# Patient Record
Sex: FEMALE | Race: WHITE | Employment: FULL TIME | URBAN - METROPOLITAN AREA
[De-identification: names, ages, dates, MRNs, and addresses within clinical notes are randomized per-mention and may not be internally consistent; named-entity substitution may affect disease eponyms.]

---

## 2017-01-06 PROBLEM — J40 BRONCHITIS: Status: ACTIVE | Noted: 2017-01-06

## 2017-01-06 PROBLEM — J01.00 ACUTE NON-RECURRENT MAXILLARY SINUSITIS: Status: ACTIVE | Noted: 2017-01-06

## 2018-09-30 ENCOUNTER — HOSPITAL ENCOUNTER (OUTPATIENT)
Age: 43
Discharge: HOME | End: 2018-09-30
Payer: COMMERCIAL

## 2018-09-30 ENCOUNTER — HOSPITAL ENCOUNTER (INPATIENT)
Age: 43
LOS: 1 days | Discharge: HOME | DRG: 392 | End: 2018-10-02
Attending: EMERGENCY MEDICINE | Admitting: INTERNAL MEDICINE
Payer: COMMERCIAL

## 2018-09-30 ENCOUNTER — APPOINTMENT (OUTPATIENT)
Dept: CT IMAGING | Age: 43
DRG: 392 | End: 2018-09-30
Payer: COMMERCIAL

## 2018-09-30 DIAGNOSIS — R10.32 LEFT LOWER QUADRANT PAIN: ICD-10-CM

## 2018-09-30 DIAGNOSIS — K57.32 DIVERTICULITIS OF COLON: Primary | ICD-10-CM

## 2018-09-30 PROBLEM — E66.01 MORBID OBESITY WITH BMI OF 50.0-59.9, ADULT (HCC): Status: ACTIVE | Noted: 2018-09-30

## 2018-09-30 PROBLEM — K57.92 DIVERTICULITIS: Status: ACTIVE | Noted: 2018-09-30

## 2018-09-30 PROBLEM — F31.9 BIPOLAR DISORDER (HCC): Status: ACTIVE | Noted: 2018-09-30

## 2018-09-30 LAB
ALBUMIN SERPL-MCNC: 4.2 G/DL (ref 3.5–5.2)
ALP BLD-CCNC: 53 U/L (ref 35–104)
ALT SERPL-CCNC: 51 U/L (ref 0–32)
ANION GAP SERPL CALCULATED.3IONS-SCNC: 15 MMOL/L (ref 7–16)
AST SERPL-CCNC: 32 U/L (ref 0–31)
BASOPHILS ABSOLUTE: 0.05 E9/L (ref 0–0.2)
BASOPHILS RELATIVE PERCENT: 0.5 % (ref 0–2)
BILIRUB SERPL-MCNC: 0.9 MG/DL (ref 0–1.2)
BILIRUBIN URINE: NEGATIVE
BLOOD, URINE: NEGATIVE
BUN BLDV-MCNC: 11 MG/DL (ref 6–20)
CALCIUM SERPL-MCNC: 8.9 MG/DL (ref 8.6–10.2)
CHLORIDE BLD-SCNC: 100 MMOL/L (ref 98–107)
CLARITY: CLEAR
CO2: 24 MMOL/L (ref 22–29)
COLOR: YELLOW
CREAT SERPL-MCNC: 0.8 MG/DL (ref 0.5–1)
EOSINOPHILS ABSOLUTE: 0.44 E9/L (ref 0.05–0.5)
EOSINOPHILS RELATIVE PERCENT: 4 % (ref 0–6)
GFR AFRICAN AMERICAN: >60
GFR NON-AFRICAN AMERICAN: >60 ML/MIN/1.73
GLUCOSE BLD-MCNC: 102 MG/DL (ref 74–109)
GLUCOSE URINE: NEGATIVE MG/DL
HCT VFR BLD CALC: 40.3 % (ref 34–48)
HEMOGLOBIN: 14.2 G/DL (ref 11.5–15.5)
IMMATURE GRANULOCYTES #: 0.06 E9/L
IMMATURE GRANULOCYTES %: 0.5 % (ref 0–5)
KETONES, URINE: NEGATIVE MG/DL
LEUKOCYTE ESTERASE, URINE: NEGATIVE
LIPASE: 30 U/L (ref 13–60)
LYMPHOCYTES ABSOLUTE: 1.71 E9/L (ref 1.5–4)
LYMPHOCYTES RELATIVE PERCENT: 15.4 % (ref 20–42)
MCH RBC QN AUTO: 32.7 PG (ref 26–35)
MCHC RBC AUTO-ENTMCNC: 35.2 % (ref 32–34.5)
MCV RBC AUTO: 92.9 FL (ref 80–99.9)
MONOCYTES ABSOLUTE: 0.66 E9/L (ref 0.1–0.95)
MONOCYTES RELATIVE PERCENT: 6 % (ref 2–12)
NEUTROPHILS ABSOLUTE: 8.15 E9/L (ref 1.8–7.3)
NEUTROPHILS RELATIVE PERCENT: 73.6 % (ref 43–80)
NITRITE, URINE: NEGATIVE
PDW BLD-RTO: 11.6 FL (ref 11.5–15)
PH UA: 6 (ref 5–9)
PLATELET # BLD: 199 E9/L (ref 130–450)
PMV BLD AUTO: 11 FL (ref 7–12)
POTASSIUM SERPL-SCNC: 3.5 MMOL/L (ref 3.5–5)
PROTEIN UA: NEGATIVE MG/DL
RBC # BLD: 4.34 E12/L (ref 3.5–5.5)
SODIUM BLD-SCNC: 139 MMOL/L (ref 132–146)
SPECIFIC GRAVITY UA: 1.01 (ref 1–1.03)
TOTAL PROTEIN: 6.4 G/DL (ref 6.4–8.3)
UROBILINOGEN, URINE: 0.2 E.U./DL
WBC # BLD: 11.1 E9/L (ref 4.5–11.5)

## 2018-09-30 PROCEDURE — 96375 TX/PRO/DX INJ NEW DRUG ADDON: CPT

## 2018-09-30 PROCEDURE — 96365 THER/PROPH/DIAG IV INF INIT: CPT

## 2018-09-30 PROCEDURE — 80053 COMPREHEN METABOLIC PANEL: CPT

## 2018-09-30 PROCEDURE — G0378 HOSPITAL OBSERVATION PER HR: HCPCS

## 2018-09-30 PROCEDURE — 74176 CT ABD & PELVIS W/O CONTRAST: CPT

## 2018-09-30 PROCEDURE — 99285 EMERGENCY DEPT VISIT HI MDM: CPT

## 2018-09-30 PROCEDURE — 6370000000 HC RX 637 (ALT 250 FOR IP): Performed by: INTERNAL MEDICINE

## 2018-09-30 PROCEDURE — 6360000002 HC RX W HCPCS: Performed by: EMERGENCY MEDICINE

## 2018-09-30 PROCEDURE — 2500000003 HC RX 250 WO HCPCS: Performed by: INTERNAL MEDICINE

## 2018-09-30 PROCEDURE — 81003 URINALYSIS AUTO W/O SCOPE: CPT

## 2018-09-30 PROCEDURE — 2500000003 HC RX 250 WO HCPCS: Performed by: EMERGENCY MEDICINE

## 2018-09-30 PROCEDURE — 36415 COLL VENOUS BLD VENIPUNCTURE: CPT

## 2018-09-30 PROCEDURE — 85025 COMPLETE CBC W/AUTO DIFF WBC: CPT

## 2018-09-30 PROCEDURE — A0425 GROUND MILEAGE: HCPCS

## 2018-09-30 PROCEDURE — 83690 ASSAY OF LIPASE: CPT

## 2018-09-30 PROCEDURE — 2580000003 HC RX 258: Performed by: EMERGENCY MEDICINE

## 2018-09-30 PROCEDURE — A0428 BLS: HCPCS

## 2018-09-30 PROCEDURE — 2580000003 HC RX 258: Performed by: INTERNAL MEDICINE

## 2018-09-30 PROCEDURE — 6360000002 HC RX W HCPCS: Performed by: INTERNAL MEDICINE

## 2018-09-30 RX ORDER — OXYCODONE HYDROCHLORIDE AND ACETAMINOPHEN 5; 325 MG/1; MG/1
1 TABLET ORAL EVERY 4 HOURS PRN
Status: DISCONTINUED | OUTPATIENT
Start: 2018-09-30 | End: 2018-10-02 | Stop reason: HOSPADM

## 2018-09-30 RX ORDER — OXYCODONE HYDROCHLORIDE AND ACETAMINOPHEN 5; 325 MG/1; MG/1
2 TABLET ORAL EVERY 4 HOURS PRN
Status: DISCONTINUED | OUTPATIENT
Start: 2018-09-30 | End: 2018-10-02 | Stop reason: HOSPADM

## 2018-09-30 RX ORDER — MORPHINE SULFATE 4 MG/ML
4 INJECTION, SOLUTION INTRAMUSCULAR; INTRAVENOUS ONCE
Status: COMPLETED | OUTPATIENT
Start: 2018-09-30 | End: 2018-09-30

## 2018-09-30 RX ORDER — ONDANSETRON 2 MG/ML
4 INJECTION INTRAMUSCULAR; INTRAVENOUS EVERY 6 HOURS PRN
Status: DISCONTINUED | OUTPATIENT
Start: 2018-09-30 | End: 2018-10-02 | Stop reason: HOSPADM

## 2018-09-30 RX ORDER — ATORVASTATIN CALCIUM 10 MG/1
10 TABLET, FILM COATED ORAL DAILY
COMMUNITY
End: 2021-09-27

## 2018-09-30 RX ORDER — HYDROCHLOROTHIAZIDE 25 MG/1
25 TABLET ORAL DAILY
COMMUNITY
End: 2019-01-09 | Stop reason: SDUPTHER

## 2018-09-30 RX ORDER — SODIUM CHLORIDE 0.9 % (FLUSH) 0.9 %
10 SYRINGE (ML) INJECTION EVERY 12 HOURS SCHEDULED
Status: DISCONTINUED | OUTPATIENT
Start: 2018-09-30 | End: 2018-10-02 | Stop reason: HOSPADM

## 2018-09-30 RX ORDER — SODIUM CHLORIDE 9 MG/ML
INJECTION, SOLUTION INTRAVENOUS CONTINUOUS
Status: DISCONTINUED | OUTPATIENT
Start: 2018-09-30 | End: 2018-10-02

## 2018-09-30 RX ORDER — CIPROFLOXACIN 2 MG/ML
400 INJECTION, SOLUTION INTRAVENOUS EVERY 12 HOURS
Status: DISCONTINUED | OUTPATIENT
Start: 2018-09-30 | End: 2018-10-02 | Stop reason: HOSPADM

## 2018-09-30 RX ORDER — 0.9 % SODIUM CHLORIDE 0.9 %
500 INTRAVENOUS SOLUTION INTRAVENOUS ONCE
Status: COMPLETED | OUTPATIENT
Start: 2018-09-30 | End: 2018-09-30

## 2018-09-30 RX ORDER — 0.9 % SODIUM CHLORIDE 0.9 %
1000 INTRAVENOUS SOLUTION INTRAVENOUS ONCE
Status: COMPLETED | OUTPATIENT
Start: 2018-09-30 | End: 2018-09-30

## 2018-09-30 RX ORDER — ACETAMINOPHEN 325 MG/1
650 TABLET ORAL EVERY 4 HOURS PRN
Status: DISCONTINUED | OUTPATIENT
Start: 2018-09-30 | End: 2018-10-02 | Stop reason: HOSPADM

## 2018-09-30 RX ORDER — SODIUM CHLORIDE 0.9 % (FLUSH) 0.9 %
10 SYRINGE (ML) INJECTION PRN
Status: DISCONTINUED | OUTPATIENT
Start: 2018-09-30 | End: 2018-10-02 | Stop reason: HOSPADM

## 2018-09-30 RX ORDER — ONDANSETRON 2 MG/ML
4 INJECTION INTRAMUSCULAR; INTRAVENOUS ONCE
Status: COMPLETED | OUTPATIENT
Start: 2018-09-30 | End: 2018-09-30

## 2018-09-30 RX ADMIN — METRONIDAZOLE 500 MG: 500 INJECTION, SOLUTION INTRAVENOUS at 16:03

## 2018-09-30 RX ADMIN — CIPROFLOXACIN 400 MG: 2 INJECTION, SOLUTION INTRAVENOUS at 21:43

## 2018-09-30 RX ADMIN — OXYCODONE AND ACETAMINOPHEN 1 TABLET: 5; 325 TABLET ORAL at 20:09

## 2018-09-30 RX ADMIN — METRONIDAZOLE 500 MG: 500 INJECTION, SOLUTION INTRAVENOUS at 20:10

## 2018-09-30 RX ADMIN — ONDANSETRON 4 MG: 2 INJECTION INTRAMUSCULAR; INTRAVENOUS at 14:38

## 2018-09-30 RX ADMIN — Medication 10 ML: at 20:10

## 2018-09-30 RX ADMIN — MORPHINE SULFATE 4 MG: 4 INJECTION INTRAVENOUS at 14:39

## 2018-09-30 RX ADMIN — SODIUM CHLORIDE 1000 ML: 9 INJECTION, SOLUTION INTRAVENOUS at 14:39

## 2018-09-30 RX ADMIN — SODIUM CHLORIDE: 9 INJECTION, SOLUTION INTRAVENOUS at 20:13

## 2018-09-30 RX ADMIN — MORPHINE SULFATE 4 MG: 4 INJECTION INTRAVENOUS at 17:45

## 2018-09-30 RX ADMIN — SODIUM CHLORIDE 500 ML: 9 INJECTION, SOLUTION INTRAVENOUS at 16:03

## 2018-09-30 ASSESSMENT — PAIN DESCRIPTION - PROGRESSION: CLINICAL_PROGRESSION: GRADUALLY IMPROVING

## 2018-09-30 ASSESSMENT — PAIN DESCRIPTION - DESCRIPTORS
DESCRIPTORS: DISCOMFORT
DESCRIPTORS: SHARP
DESCRIPTORS: HEADACHE

## 2018-09-30 ASSESSMENT — PAIN DESCRIPTION - FREQUENCY
FREQUENCY: CONTINUOUS
FREQUENCY: INTERMITTENT
FREQUENCY: CONTINUOUS

## 2018-09-30 ASSESSMENT — PAIN SCALES - GENERAL
PAINLEVEL_OUTOF10: 10
PAINLEVEL_OUTOF10: 4
PAINLEVEL_OUTOF10: 10
PAINLEVEL_OUTOF10: 5
PAINLEVEL_OUTOF10: 2
PAINLEVEL_OUTOF10: 4

## 2018-09-30 ASSESSMENT — PAIN DESCRIPTION - LOCATION
LOCATION: ABDOMEN
LOCATION: ABDOMEN
LOCATION: GENERALIZED

## 2018-09-30 ASSESSMENT — PAIN DESCRIPTION - PAIN TYPE
TYPE: ACUTE PAIN

## 2018-09-30 ASSESSMENT — PAIN DESCRIPTION - ORIENTATION: ORIENTATION: MID;LOWER

## 2018-09-30 ASSESSMENT — PAIN DESCRIPTION - ONSET: ONSET: GRADUAL

## 2018-09-30 NOTE — ED PROVIDER NOTES
Phosphatase 53 35 - 104 U/L    ALT 51 (H) 0 - 32 U/L    AST 32 (H) 0 - 31 U/L   CBC Auto Differential   Result Value Ref Range    WBC 11.1 4.5 - 11.5 E9/L    RBC 4.34 3.50 - 5.50 E12/L    Hemoglobin 14.2 11.5 - 15.5 g/dL    Hematocrit 40.3 34.0 - 48.0 %    MCV 92.9 80.0 - 99.9 fL    MCH 32.7 26.0 - 35.0 pg    MCHC 35.2 (H) 32.0 - 34.5 %    RDW 11.6 11.5 - 15.0 fL    Platelets 709 420 - 365 E9/L    MPV 11.0 7.0 - 12.0 fL    Neutrophils % 73.6 43.0 - 80.0 %    Immature Granulocytes % 0.5 0.0 - 5.0 %    Lymphocytes % 15.4 (L) 20.0 - 42.0 %    Monocytes % 6.0 2.0 - 12.0 %    Eosinophils % 4.0 0.0 - 6.0 %    Basophils % 0.5 0.0 - 2.0 %    Neutrophils # 8.15 (H) 1.80 - 7.30 E9/L    Immature Granulocytes # 0.06 E9/L    Lymphocytes # 1.71 1.50 - 4.00 E9/L    Monocytes # 0.66 0.10 - 0.95 E9/L    Eosinophils # 0.44 0.05 - 0.50 E9/L    Basophils # 0.05 0.00 - 0.20 E9/L   Urinalysis   Result Value Ref Range    Color, UA Yellow Straw/Yellow    Clarity, UA Clear Clear    Glucose, Ur Negative Negative mg/dL    Bilirubin Urine Negative Negative    Ketones, Urine Negative Negative mg/dL    Specific Gravity, UA 1.015 1.005 - 1.030    Blood, Urine Negative Negative    pH, UA 6.0 5.0 - 9.0    Protein, UA Negative Negative mg/dL    Urobilinogen, Urine 0.2 <2.0 E.U./dL    Nitrite, Urine Negative Negative    Leukocyte Esterase, Urine Negative Negative       RADIOLOGY:  Interpreted by Radiologist.  CT ABDOMEN PELVIS WO CONTRAST   Final Result   Acute diverticulitis of the proximal/mid third of the sigmoid colon,   as above described. See above other comments and recommendations. ALERT:  THIS IS AN ABNORMAL REPORT          ------------------------- NURSING NOTES AND VITALS REVIEWED ---------------------------   The nursing notes within the ED encounter and vital signs as below have been reviewed.    BP (!) 138/98   Pulse 82   Temp 99.1 °F (37.3 °C) (Oral)   Resp 14   Ht 5' 8\" (1.727 m)   Wt (!) 355 lb (161 kg)   SpO2 97%

## 2018-10-01 PROBLEM — I10 HTN (HYPERTENSION), BENIGN: Chronic | Status: ACTIVE | Noted: 2018-10-01

## 2018-10-01 PROBLEM — J01.00 ACUTE NON-RECURRENT MAXILLARY SINUSITIS: Status: RESOLVED | Noted: 2017-01-06 | Resolved: 2018-10-01

## 2018-10-01 PROBLEM — E78.2 MIXED HYPERLIPIDEMIA: Chronic | Status: ACTIVE | Noted: 2018-10-01

## 2018-10-01 PROBLEM — K57.92 ACUTE DIVERTICULITIS: Status: ACTIVE | Noted: 2018-10-01

## 2018-10-01 PROBLEM — J40 BRONCHITIS: Status: RESOLVED | Noted: 2017-01-06 | Resolved: 2018-10-01

## 2018-10-01 LAB
ANION GAP SERPL CALCULATED.3IONS-SCNC: 16 MMOL/L (ref 7–16)
BUN BLDV-MCNC: 9 MG/DL (ref 6–20)
CALCIUM SERPL-MCNC: 8.3 MG/DL (ref 8.6–10.2)
CHLORIDE BLD-SCNC: 96 MMOL/L (ref 98–107)
CO2: 25 MMOL/L (ref 22–29)
CREAT SERPL-MCNC: 0.8 MG/DL (ref 0.5–1)
GFR AFRICAN AMERICAN: >60
GFR NON-AFRICAN AMERICAN: >60 ML/MIN/1.73
GLUCOSE BLD-MCNC: 115 MG/DL (ref 74–109)
HCT VFR BLD CALC: 37.9 % (ref 34–48)
HEMOGLOBIN: 13 G/DL (ref 11.5–15.5)
MCH RBC QN AUTO: 32.1 PG (ref 26–35)
MCHC RBC AUTO-ENTMCNC: 34.3 % (ref 32–34.5)
MCV RBC AUTO: 93.6 FL (ref 80–99.9)
PDW BLD-RTO: 11.9 FL (ref 11.5–15)
PLATELET # BLD: 176 E9/L (ref 130–450)
PMV BLD AUTO: 10.5 FL (ref 7–12)
POTASSIUM REFLEX MAGNESIUM: 3.6 MMOL/L (ref 3.5–5)
RBC # BLD: 4.05 E12/L (ref 3.5–5.5)
SODIUM BLD-SCNC: 137 MMOL/L (ref 132–146)
WBC # BLD: 10.6 E9/L (ref 4.5–11.5)

## 2018-10-01 PROCEDURE — 6360000002 HC RX W HCPCS: Performed by: INTERNAL MEDICINE

## 2018-10-01 PROCEDURE — 36415 COLL VENOUS BLD VENIPUNCTURE: CPT

## 2018-10-01 PROCEDURE — 1200000000 HC SEMI PRIVATE

## 2018-10-01 PROCEDURE — 85027 COMPLETE CBC AUTOMATED: CPT

## 2018-10-01 PROCEDURE — 80048 BASIC METABOLIC PNL TOTAL CA: CPT

## 2018-10-01 PROCEDURE — 6370000000 HC RX 637 (ALT 250 FOR IP): Performed by: INTERNAL MEDICINE

## 2018-10-01 PROCEDURE — 2580000003 HC RX 258: Performed by: INTERNAL MEDICINE

## 2018-10-01 PROCEDURE — G0378 HOSPITAL OBSERVATION PER HR: HCPCS

## 2018-10-01 PROCEDURE — 2500000003 HC RX 250 WO HCPCS: Performed by: INTERNAL MEDICINE

## 2018-10-01 RX ORDER — HYDROCHLOROTHIAZIDE 25 MG/1
25 TABLET ORAL DAILY
Status: DISCONTINUED | OUTPATIENT
Start: 2018-10-01 | End: 2018-10-02 | Stop reason: HOSPADM

## 2018-10-01 RX ORDER — MORPHINE SULFATE 2 MG/ML
2 INJECTION, SOLUTION INTRAMUSCULAR; INTRAVENOUS EVERY 4 HOURS PRN
Status: DISCONTINUED | OUTPATIENT
Start: 2018-10-01 | End: 2018-10-02 | Stop reason: HOSPADM

## 2018-10-01 RX ORDER — ATORVASTATIN CALCIUM 10 MG/1
10 TABLET, FILM COATED ORAL DAILY
Status: DISCONTINUED | OUTPATIENT
Start: 2018-10-01 | End: 2018-10-02 | Stop reason: HOSPADM

## 2018-10-01 RX ADMIN — ATORVASTATIN CALCIUM 10 MG: 10 TABLET, FILM COATED ORAL at 15:01

## 2018-10-01 RX ADMIN — HYDROCHLOROTHIAZIDE 25 MG: 25 TABLET ORAL at 15:01

## 2018-10-01 RX ADMIN — METRONIDAZOLE 500 MG: 500 INJECTION, SOLUTION INTRAVENOUS at 13:37

## 2018-10-01 RX ADMIN — METRONIDAZOLE 500 MG: 500 INJECTION, SOLUTION INTRAVENOUS at 03:45

## 2018-10-01 RX ADMIN — CIPROFLOXACIN 400 MG: 2 INJECTION, SOLUTION INTRAVENOUS at 08:52

## 2018-10-01 RX ADMIN — SODIUM CHLORIDE: 9 INJECTION, SOLUTION INTRAVENOUS at 13:37

## 2018-10-01 RX ADMIN — OXYCODONE AND ACETAMINOPHEN 1 TABLET: 5; 325 TABLET ORAL at 01:17

## 2018-10-01 RX ADMIN — OXYCODONE AND ACETAMINOPHEN 1 TABLET: 5; 325 TABLET ORAL at 13:45

## 2018-10-01 RX ADMIN — OXYCODONE AND ACETAMINOPHEN 2 TABLET: 5; 325 TABLET ORAL at 22:53

## 2018-10-01 RX ADMIN — CIPROFLOXACIN 400 MG: 2 INJECTION, SOLUTION INTRAVENOUS at 22:47

## 2018-10-01 RX ADMIN — OXYCODONE AND ACETAMINOPHEN 1 TABLET: 5; 325 TABLET ORAL at 18:41

## 2018-10-01 RX ADMIN — METRONIDAZOLE 500 MG: 500 INJECTION, SOLUTION INTRAVENOUS at 20:28

## 2018-10-01 ASSESSMENT — PAIN DESCRIPTION - ONSET
ONSET: ON-GOING

## 2018-10-01 ASSESSMENT — PAIN DESCRIPTION - FREQUENCY
FREQUENCY: CONTINUOUS

## 2018-10-01 ASSESSMENT — PAIN DESCRIPTION - PROGRESSION
CLINICAL_PROGRESSION: GRADUALLY IMPROVING
CLINICAL_PROGRESSION: GRADUALLY IMPROVING

## 2018-10-01 ASSESSMENT — PAIN DESCRIPTION - PAIN TYPE
TYPE: ACUTE PAIN

## 2018-10-01 ASSESSMENT — PAIN DESCRIPTION - DESCRIPTORS
DESCRIPTORS: CRAMPING
DESCRIPTORS: DISCOMFORT;CRAMPING
DESCRIPTORS: ACHING;CONSTANT;CRAMPING
DESCRIPTORS: CRAMPING

## 2018-10-01 ASSESSMENT — PAIN DESCRIPTION - ORIENTATION
ORIENTATION: MID;LOWER
ORIENTATION: LOWER

## 2018-10-01 ASSESSMENT — PAIN DESCRIPTION - LOCATION
LOCATION: ABDOMEN

## 2018-10-01 ASSESSMENT — PAIN SCALES - GENERAL
PAINLEVEL_OUTOF10: 6
PAINLEVEL_OUTOF10: 5
PAINLEVEL_OUTOF10: 7
PAINLEVEL_OUTOF10: 4
PAINLEVEL_OUTOF10: 6
PAINLEVEL_OUTOF10: 5
PAINLEVEL_OUTOF10: 6

## 2018-10-01 NOTE — H&P
History:   Family history of diverticulosis in her father, uncle and cousin. REVIEW OF SYSTEMS:  As above in the HPI, otherwise negative    PHYSICAL EXAM:    Vitals:  /83   Pulse 72   Temp 98.1 °F (36.7 °C) (Temporal)   Resp 16   Ht 5' 8\" (1.727 m)   Wt (!) 355 lb (161 kg)   SpO2 96%   BMI 53.98 kg/m²     General:  Awake, alert, oriented X 3. Well developed, well nourished, well groomed. No apparent distress. HEENT:  Normocephalic, atraumatic. No scleral icterus. No conjunctival injection. Normal lips, teeth, and gums. No nasal discharge. Neck:  Supple  Heart:  RRR, no murmurs, gallops, or rubs  Lungs:  CTA bilaterally, bilat symmetrical expansion, no wheeze, rales, or rhonchi  Abdomen:   Bowel sounds present, soft, non distended, tender on palpation of LUQ/LLQ, no masses, no organomegaly, no peritoneal signs  Extremities:  No clubbing, cyanosis, or edema  Skin:  Warm and dry, no open lesions or rash  Neuro:  Cranial nerves 2-12 intact, no focal deficits  Breast: deferred  Rectal: deferred  Genitalia:  deferred    LABS:  CBC:   Lab Results   Component Value Date    WBC 10.6 10/01/2018    RBC 4.05 10/01/2018    HGB 13.0 10/01/2018    HCT 37.9 10/01/2018    MCV 93.6 10/01/2018    MCH 32.1 10/01/2018    MCHC 34.3 10/01/2018    RDW 11.9 10/01/2018     10/01/2018    MPV 10.5 10/01/2018     BMP:    Lab Results   Component Value Date     10/01/2018    K 3.6 10/01/2018    CL 96 10/01/2018    CO2 25 10/01/2018    BUN 9 10/01/2018    CREATININE 0.8 10/01/2018    CALCIUM 8.3 10/01/2018    GFRAA >60 10/01/2018    LABGLOM >60 10/01/2018    GLUCOSE 115 10/01/2018        U/A:    Lab Results   Component Value Date    COLORU Yellow 09/30/2018    PROTEINU Negative 09/30/2018    PHUR 6.0 09/30/2018    CLARITYU Clear 09/30/2018    SPECGRAV 1.015 09/30/2018    LEUKOCYTESUR Negative 09/30/2018    UROBILINOGEN 0.2 09/30/2018    BILIRUBINUR Negative 09/30/2018    BLOODU Negative 09/30/2018    GLUCOSEU

## 2018-10-02 VITALS
WEIGHT: 293 LBS | BODY MASS INDEX: 44.41 KG/M2 | SYSTOLIC BLOOD PRESSURE: 122 MMHG | OXYGEN SATURATION: 98 % | RESPIRATION RATE: 18 BRPM | DIASTOLIC BLOOD PRESSURE: 78 MMHG | HEART RATE: 80 BPM | TEMPERATURE: 97.9 F | HEIGHT: 68 IN

## 2018-10-02 PROBLEM — K57.92 DIVERTICULITIS: Status: RESOLVED | Noted: 2018-09-30 | Resolved: 2018-10-02

## 2018-10-02 PROCEDURE — 90686 IIV4 VACC NO PRSV 0.5 ML IM: CPT | Performed by: INTERNAL MEDICINE

## 2018-10-02 PROCEDURE — 6360000002 HC RX W HCPCS: Performed by: INTERNAL MEDICINE

## 2018-10-02 PROCEDURE — G0008 ADMIN INFLUENZA VIRUS VAC: HCPCS | Performed by: INTERNAL MEDICINE

## 2018-10-02 PROCEDURE — G0378 HOSPITAL OBSERVATION PER HR: HCPCS

## 2018-10-02 PROCEDURE — 6370000000 HC RX 637 (ALT 250 FOR IP): Performed by: INTERNAL MEDICINE

## 2018-10-02 PROCEDURE — 2580000003 HC RX 258: Performed by: INTERNAL MEDICINE

## 2018-10-02 PROCEDURE — 2500000003 HC RX 250 WO HCPCS: Performed by: INTERNAL MEDICINE

## 2018-10-02 RX ORDER — CIPROFLOXACIN 500 MG/1
500 TABLET, FILM COATED ORAL 2 TIMES DAILY
Qty: 14 TABLET | Refills: 0 | Status: SHIPPED | OUTPATIENT
Start: 2018-10-02 | End: 2018-10-09

## 2018-10-02 RX ORDER — METRONIDAZOLE 500 MG/1
500 TABLET ORAL 3 TIMES DAILY
Qty: 21 TABLET | Refills: 0 | Status: SHIPPED | OUTPATIENT
Start: 2018-10-02 | End: 2018-10-09

## 2018-10-02 RX ORDER — OXYCODONE HYDROCHLORIDE AND ACETAMINOPHEN 5; 325 MG/1; MG/1
1 TABLET ORAL EVERY 6 HOURS PRN
Qty: 12 TABLET | Refills: 0 | Status: SHIPPED | OUTPATIENT
Start: 2018-10-02 | End: 2018-10-05

## 2018-10-02 RX ORDER — ONDANSETRON 4 MG/1
4 TABLET, FILM COATED ORAL EVERY 8 HOURS PRN
Qty: 10 TABLET | Refills: 0 | Status: SHIPPED | OUTPATIENT
Start: 2018-10-02 | End: 2018-11-30 | Stop reason: ALTCHOICE

## 2018-10-02 RX ADMIN — METRONIDAZOLE 500 MG: 500 INJECTION, SOLUTION INTRAVENOUS at 03:59

## 2018-10-02 RX ADMIN — SODIUM CHLORIDE: 9 INJECTION, SOLUTION INTRAVENOUS at 08:38

## 2018-10-02 RX ADMIN — CIPROFLOXACIN 400 MG: 2 INJECTION, SOLUTION INTRAVENOUS at 10:01

## 2018-10-02 RX ADMIN — HYDROCHLOROTHIAZIDE 25 MG: 25 TABLET ORAL at 10:01

## 2018-10-02 RX ADMIN — INFLUENZA A VIRUS A/MICHIGAN/45/2015 X-275 (H1N1) ANTIGEN (FORMALDEHYDE INACTIVATED), INFLUENZA A VIRUS A/SINGAPORE/INFIMH-16-0019/2016 IVR-186 (H3N2) ANTIGEN (FORMALDEHYDE INACTIVATED), INFLUENZA B VIRUS B/PHUKET/3073/2013 ANTIGEN (FORMALDEHYDE INACTIVATED), AND INFLUENZA B VIRUS B/MARYLAND/15/2016 BX-69A ANTIGEN (FORMALDEHYDE INACTIVATED) 0.5 ML: 15; 15; 15; 15 INJECTION, SUSPENSION INTRAMUSCULAR at 11:17

## 2018-10-02 RX ADMIN — ONDANSETRON HYDROCHLORIDE 4 MG: 2 SOLUTION INTRAMUSCULAR; INTRAVENOUS at 08:22

## 2018-10-02 RX ADMIN — ATORVASTATIN CALCIUM 10 MG: 10 TABLET, FILM COATED ORAL at 10:01

## 2018-10-02 RX ADMIN — Medication 10 ML: at 08:23

## 2018-10-02 ASSESSMENT — PAIN SCALES - GENERAL: PAINLEVEL_OUTOF10: 2

## 2018-10-02 NOTE — DISCHARGE INSTR - DIET

## 2018-10-02 NOTE — PROGRESS NOTES
Discharge note  Dietician visits patient in room. Discharge instructions reviewed and patient verbalizes understanding, signed copy for patient and medical records. Meds to beds program completed, patient satisfied with all 4 scripts filled. Friend here, out in wheelchair in stable condition.

## 2018-10-02 NOTE — PROGRESS NOTES
CLINICAL PHARMACY NOTE: MEDS TO [address] Select Patient?: No  Total # of Prescriptions Filled: 4   The following medications were delivered to the patient:  · Zofran 4mg  · Cipro 500mg  · Plaqul 500mg  · Percocet 5/325mg  Total # of Interventions Completed: 3  Time Spent (min): 30    Additional Documentation:

## 2018-11-02 ENCOUNTER — HOSPITAL ENCOUNTER (OUTPATIENT)
Dept: MAMMOGRAPHY | Age: 43
Discharge: HOME | End: 2018-11-04
Payer: COMMERCIAL

## 2018-11-02 ENCOUNTER — HOSPITAL ENCOUNTER (OUTPATIENT)
Age: 43
Discharge: HOME | End: 2018-11-04
Payer: COMMERCIAL

## 2018-11-02 DIAGNOSIS — Z12.31 ENCOUNTER FOR SCREENING MAMMOGRAM FOR MALIGNANT NEOPLASM OF BREAST: ICD-10-CM

## 2018-11-02 PROCEDURE — 77067 SCR MAMMO BI INCL CAD: CPT

## 2018-11-30 RX ORDER — OMEPRAZOLE 20 MG/1
40 CAPSULE, DELAYED RELEASE ORAL EVERY OTHER DAY
COMMUNITY
End: 2021-09-27

## 2019-01-09 ENCOUNTER — OFFICE VISIT (OUTPATIENT)
Dept: BARIATRICS/WEIGHT MGMT | Age: 44
End: 2019-01-09
Payer: COMMERCIAL

## 2019-01-09 VITALS
DIASTOLIC BLOOD PRESSURE: 80 MMHG | TEMPERATURE: 96.8 F | WEIGHT: 293 LBS | HEIGHT: 68 IN | SYSTOLIC BLOOD PRESSURE: 134 MMHG | BODY MASS INDEX: 44.41 KG/M2 | RESPIRATION RATE: 24 BRPM | HEART RATE: 76 BPM

## 2019-01-09 DIAGNOSIS — E66.01 MORBID OBESITY DUE TO EXCESS CALORIES (HCC): ICD-10-CM

## 2019-01-09 DIAGNOSIS — M25.561 CHRONIC PAIN OF BOTH KNEES: ICD-10-CM

## 2019-01-09 DIAGNOSIS — G89.29 CHRONIC PAIN OF BOTH KNEES: ICD-10-CM

## 2019-01-09 DIAGNOSIS — M25.561 ARTHRALGIA OF BOTH KNEES: ICD-10-CM

## 2019-01-09 DIAGNOSIS — M25.562 ARTHRALGIA OF BOTH KNEES: ICD-10-CM

## 2019-01-09 DIAGNOSIS — M25.562 CHRONIC PAIN OF BOTH KNEES: ICD-10-CM

## 2019-01-09 DIAGNOSIS — K57.90 DIVERTICULOSIS OF INTESTINE WITHOUT BLEEDING, UNSPECIFIED INTESTINAL TRACT LOCATION: ICD-10-CM

## 2019-01-09 PROCEDURE — 99205 OFFICE O/P NEW HI 60 MIN: CPT | Performed by: INTERNAL MEDICINE

## 2019-01-09 PROCEDURE — 99202 OFFICE O/P NEW SF 15 MIN: CPT

## 2019-01-09 RX ORDER — HYDROCHLOROTHIAZIDE 25 MG/1
TABLET ORAL
COMMUNITY
Start: 2018-06-14 | End: 2019-01-09 | Stop reason: SDUPTHER

## 2019-01-21 RX ORDER — HYDROCHLOROTHIAZIDE 25 MG/1
25 TABLET ORAL DAILY
COMMUNITY
End: 2021-09-27

## 2019-01-22 ENCOUNTER — PREP FOR PROCEDURE (OUTPATIENT)
Dept: SURGERY | Age: 44
End: 2019-01-22

## 2019-01-22 RX ORDER — SODIUM CHLORIDE 9 MG/ML
INJECTION, SOLUTION INTRAVENOUS CONTINUOUS
Status: CANCELLED | OUTPATIENT
Start: 2019-01-22

## 2019-01-25 ENCOUNTER — HOSPITAL ENCOUNTER (OUTPATIENT)
Age: 44
Setting detail: OUTPATIENT SURGERY
Discharge: HOME | End: 2019-01-25
Attending: SURGERY | Admitting: SURGERY
Payer: COMMERCIAL

## 2019-01-25 ENCOUNTER — ANESTHESIA EVENT (OUTPATIENT)
Dept: ENDOSCOPY | Age: 44
End: 2019-01-25
Payer: COMMERCIAL

## 2019-01-25 ENCOUNTER — ANESTHESIA (OUTPATIENT)
Dept: ENDOSCOPY | Age: 44
End: 2019-01-25
Payer: COMMERCIAL

## 2019-01-25 VITALS
DIASTOLIC BLOOD PRESSURE: 78 MMHG | TEMPERATURE: 98 F | WEIGHT: 293 LBS | HEART RATE: 59 BPM | HEIGHT: 68 IN | OXYGEN SATURATION: 98 % | RESPIRATION RATE: 20 BRPM | SYSTOLIC BLOOD PRESSURE: 154 MMHG | BODY MASS INDEX: 44.41 KG/M2

## 2019-01-25 VITALS — OXYGEN SATURATION: 96 % | SYSTOLIC BLOOD PRESSURE: 146 MMHG | DIASTOLIC BLOOD PRESSURE: 84 MMHG

## 2019-01-25 PROCEDURE — 7100000010 HC PHASE II RECOVERY - FIRST 15 MIN: Performed by: SURGERY

## 2019-01-25 PROCEDURE — 3700000001 HC ADD 15 MINUTES (ANESTHESIA): Performed by: SURGERY

## 2019-01-25 PROCEDURE — 6360000002 HC RX W HCPCS: Performed by: NURSE ANESTHETIST, CERTIFIED REGISTERED

## 2019-01-25 PROCEDURE — 3609009500 HC COLONOSCOPY DIAGNOSTIC OR SCREENING: Performed by: SURGERY

## 2019-01-25 PROCEDURE — 2500000003 HC RX 250 WO HCPCS: Performed by: NURSE ANESTHETIST, CERTIFIED REGISTERED

## 2019-01-25 PROCEDURE — 3700000000 HC ANESTHESIA ATTENDED CARE: Performed by: SURGERY

## 2019-01-25 PROCEDURE — 7100000011 HC PHASE II RECOVERY - ADDTL 15 MIN: Performed by: SURGERY

## 2019-01-25 PROCEDURE — 2580000003 HC RX 258: Performed by: SURGERY

## 2019-01-25 PROCEDURE — 2709999900 HC NON-CHARGEABLE SUPPLY: Performed by: SURGERY

## 2019-01-25 RX ORDER — LIDOCAINE HYDROCHLORIDE 10 MG/ML
INJECTION, SOLUTION EPIDURAL; INFILTRATION; INTRACAUDAL; PERINEURAL PRN
Status: DISCONTINUED | OUTPATIENT
Start: 2019-01-25 | End: 2019-01-25 | Stop reason: SDUPTHER

## 2019-01-25 RX ORDER — SODIUM CHLORIDE 9 MG/ML
INJECTION, SOLUTION INTRAVENOUS CONTINUOUS
Status: DISCONTINUED | OUTPATIENT
Start: 2019-01-25 | End: 2019-01-25 | Stop reason: HOSPADM

## 2019-01-25 RX ORDER — FENTANYL CITRATE 50 UG/ML
INJECTION, SOLUTION INTRAMUSCULAR; INTRAVENOUS PRN
Status: DISCONTINUED | OUTPATIENT
Start: 2019-01-25 | End: 2019-01-25 | Stop reason: SDUPTHER

## 2019-01-25 RX ORDER — PROPOFOL 10 MG/ML
INJECTION, EMULSION INTRAVENOUS PRN
Status: DISCONTINUED | OUTPATIENT
Start: 2019-01-25 | End: 2019-01-25 | Stop reason: SDUPTHER

## 2019-01-25 RX ADMIN — SODIUM CHLORIDE: 9 INJECTION, SOLUTION INTRAVENOUS at 07:56

## 2019-01-25 RX ADMIN — PROPOFOL 80 MG: 10 INJECTION, EMULSION INTRAVENOUS at 08:44

## 2019-01-25 RX ADMIN — PROPOFOL 70 MG: 10 INJECTION, EMULSION INTRAVENOUS at 08:41

## 2019-01-25 RX ADMIN — LIDOCAINE HYDROCHLORIDE 20 MG: 10 INJECTION, SOLUTION EPIDURAL; INFILTRATION; INTRACAUDAL; PERINEURAL at 08:41

## 2019-01-25 RX ADMIN — PROPOFOL 50 MG: 10 INJECTION, EMULSION INTRAVENOUS at 08:51

## 2019-01-25 RX ADMIN — FENTANYL CITRATE 50 MCG: 50 INJECTION, SOLUTION INTRAMUSCULAR; INTRAVENOUS at 08:40

## 2019-01-25 ASSESSMENT — PAIN - FUNCTIONAL ASSESSMENT: PAIN_FUNCTIONAL_ASSESSMENT: 0-10

## 2019-01-25 ASSESSMENT — PAIN DESCRIPTION - LOCATION
LOCATION: ABDOMEN

## 2019-01-25 ASSESSMENT — PAIN SCALES - GENERAL
PAINLEVEL_OUTOF10: 0

## 2019-02-14 ENCOUNTER — APPOINTMENT (OUTPATIENT)
Dept: GENERAL RADIOLOGY | Age: 44
End: 2019-02-14
Payer: COMMERCIAL

## 2019-02-14 ENCOUNTER — HOSPITAL ENCOUNTER (EMERGENCY)
Age: 44
Discharge: HOME | End: 2019-02-14
Payer: COMMERCIAL

## 2019-02-14 VITALS
WEIGHT: 293 LBS | DIASTOLIC BLOOD PRESSURE: 76 MMHG | BODY MASS INDEX: 44.41 KG/M2 | SYSTOLIC BLOOD PRESSURE: 132 MMHG | HEIGHT: 68 IN | HEART RATE: 76 BPM | TEMPERATURE: 99.4 F | RESPIRATION RATE: 16 BRPM | OXYGEN SATURATION: 97 %

## 2019-02-14 DIAGNOSIS — H93.8X3 EAR FULLNESS, BILATERAL: ICD-10-CM

## 2019-02-14 DIAGNOSIS — J20.9 BRONCHITIS WITH BRONCHOSPASM: Primary | ICD-10-CM

## 2019-02-14 LAB
ANION GAP SERPL CALCULATED.3IONS-SCNC: 11 MMOL/L (ref 7–16)
BASOPHILS ABSOLUTE: 0.05 E9/L (ref 0–0.2)
BASOPHILS RELATIVE PERCENT: 0.8 % (ref 0–2)
BUN BLDV-MCNC: 17 MG/DL (ref 6–20)
CALCIUM SERPL-MCNC: 9 MG/DL (ref 8.6–10.2)
CHLORIDE BLD-SCNC: 106 MMOL/L (ref 98–107)
CO2: 26 MMOL/L (ref 22–29)
CREAT SERPL-MCNC: 0.8 MG/DL (ref 0.5–1)
EOSINOPHILS ABSOLUTE: 0.36 E9/L (ref 0.05–0.5)
EOSINOPHILS RELATIVE PERCENT: 6 % (ref 0–6)
GFR AFRICAN AMERICAN: >60
GFR NON-AFRICAN AMERICAN: >60 ML/MIN/1.73
GLUCOSE BLD-MCNC: 98 MG/DL (ref 74–99)
HCT VFR BLD CALC: 39.2 % (ref 34–48)
HEMOGLOBIN: 13.4 G/DL (ref 11.5–15.5)
IMMATURE GRANULOCYTES #: 0.03 E9/L
IMMATURE GRANULOCYTES %: 0.5 % (ref 0–5)
INFLUENZA A BY PCR: NOT DETECTED
INFLUENZA B BY PCR: NOT DETECTED
LYMPHOCYTES ABSOLUTE: 2.11 E9/L (ref 1.5–4)
LYMPHOCYTES RELATIVE PERCENT: 34.9 % (ref 20–42)
MCH RBC QN AUTO: 32.1 PG (ref 26–35)
MCHC RBC AUTO-ENTMCNC: 34.2 % (ref 32–34.5)
MCV RBC AUTO: 93.8 FL (ref 80–99.9)
MONOCYTES ABSOLUTE: 0.31 E9/L (ref 0.1–0.95)
MONOCYTES RELATIVE PERCENT: 5.1 % (ref 2–12)
NEUTROPHILS ABSOLUTE: 3.19 E9/L (ref 1.8–7.3)
NEUTROPHILS RELATIVE PERCENT: 52.7 % (ref 43–80)
PDW BLD-RTO: 12.1 FL (ref 11.5–15)
PLATELET # BLD: 206 E9/L (ref 130–450)
PMV BLD AUTO: 10.1 FL (ref 7–12)
POTASSIUM SERPL-SCNC: 4.5 MMOL/L (ref 3.5–5)
RBC # BLD: 4.18 E12/L (ref 3.5–5.5)
SODIUM BLD-SCNC: 143 MMOL/L (ref 132–146)
TROPONIN: <0.01 NG/ML (ref 0–0.03)
WBC # BLD: 6.1 E9/L (ref 4.5–11.5)

## 2019-02-14 PROCEDURE — 6370000000 HC RX 637 (ALT 250 FOR IP): Performed by: NURSE PRACTITIONER

## 2019-02-14 PROCEDURE — 87502 INFLUENZA DNA AMP PROBE: CPT

## 2019-02-14 PROCEDURE — 80048 BASIC METABOLIC PNL TOTAL CA: CPT

## 2019-02-14 PROCEDURE — 84484 ASSAY OF TROPONIN QUANT: CPT

## 2019-02-14 PROCEDURE — 85025 COMPLETE CBC W/AUTO DIFF WBC: CPT

## 2019-02-14 PROCEDURE — 99284 EMERGENCY DEPT VISIT MOD MDM: CPT

## 2019-02-14 PROCEDURE — 36415 COLL VENOUS BLD VENIPUNCTURE: CPT

## 2019-02-14 PROCEDURE — 71046 X-RAY EXAM CHEST 2 VIEWS: CPT

## 2019-02-14 RX ORDER — ALBUTEROL SULFATE 90 UG/1
2 AEROSOL, METERED RESPIRATORY (INHALATION) EVERY 6 HOURS PRN
Qty: 1 INHALER | Refills: 0 | Status: SHIPPED | OUTPATIENT
Start: 2019-02-14 | End: 2019-07-02

## 2019-02-14 RX ORDER — PREDNISONE 20 MG/1
60 TABLET ORAL ONCE
Status: COMPLETED | OUTPATIENT
Start: 2019-02-14 | End: 2019-02-14

## 2019-02-14 RX ORDER — ACETAMINOPHEN 500 MG
1000 TABLET ORAL ONCE
Status: COMPLETED | OUTPATIENT
Start: 2019-02-14 | End: 2019-02-14

## 2019-02-14 RX ORDER — IPRATROPIUM BROMIDE AND ALBUTEROL SULFATE 2.5; .5 MG/3ML; MG/3ML
1 SOLUTION RESPIRATORY (INHALATION)
Status: COMPLETED | OUTPATIENT
Start: 2019-02-14 | End: 2019-02-14

## 2019-02-14 RX ORDER — DOXYCYCLINE HYCLATE 100 MG
100 TABLET ORAL 2 TIMES DAILY
Qty: 20 TABLET | Refills: 0 | Status: SHIPPED | OUTPATIENT
Start: 2019-02-14 | End: 2019-02-24

## 2019-02-14 RX ORDER — PREDNISONE 20 MG/1
TABLET ORAL
Qty: 18 TABLET | Refills: 0 | Status: SHIPPED | OUTPATIENT
Start: 2019-02-14 | End: 2019-02-24

## 2019-02-14 RX ADMIN — ACETAMINOPHEN 1000 MG: 500 TABLET ORAL at 13:13

## 2019-02-14 RX ADMIN — PREDNISONE 60 MG: 20 TABLET ORAL at 13:13

## 2019-02-14 RX ADMIN — IPRATROPIUM BROMIDE AND ALBUTEROL SULFATE 1 AMPULE: 2.5; .5 SOLUTION RESPIRATORY (INHALATION) at 13:00

## 2019-02-14 RX ADMIN — IPRATROPIUM BROMIDE AND ALBUTEROL SULFATE 1 AMPULE: 2.5; .5 SOLUTION RESPIRATORY (INHALATION) at 13:19

## 2019-02-14 ASSESSMENT — PAIN - FUNCTIONAL ASSESSMENT: PAIN_FUNCTIONAL_ASSESSMENT: PREVENTS OR INTERFERES SOME ACTIVE ACTIVITIES AND ADLS

## 2019-02-14 ASSESSMENT — PAIN DESCRIPTION - DESCRIPTORS: DESCRIPTORS: HEAVINESS

## 2019-02-14 ASSESSMENT — PAIN SCALES - GENERAL
PAINLEVEL_OUTOF10: 9
PAINLEVEL_OUTOF10: 9

## 2019-02-14 ASSESSMENT — PAIN DESCRIPTION - ORIENTATION: ORIENTATION: UPPER;MID

## 2019-02-14 ASSESSMENT — PAIN DESCRIPTION - LOCATION: LOCATION: CHEST

## 2019-02-14 ASSESSMENT — PAIN DESCRIPTION - PAIN TYPE: TYPE: ACUTE PAIN

## 2019-02-14 ASSESSMENT — PAIN DESCRIPTION - ONSET: ONSET: ON-GOING

## 2019-02-14 ASSESSMENT — PAIN DESCRIPTION - FREQUENCY: FREQUENCY: CONTINUOUS

## 2019-02-15 ASSESSMENT — ENCOUNTER SYMPTOMS
BLOOD IN STOOL: 0
STRIDOR: 0
CHEST TIGHTNESS: 1
SHORTNESS OF BREATH: 0
ABDOMINAL PAIN: 0
RECTAL PAIN: 0
EYES NEGATIVE: 1
ABDOMINAL DISTENTION: 0
COUGH: 1
DIARRHEA: 0
VOMITING: 0
ALLERGIC/IMMUNOLOGIC NEGATIVE: 1
GASTROINTESTINAL NEGATIVE: 1
CONSTIPATION: 0
WHEEZING: 1
NAUSEA: 0

## 2019-02-22 LAB
EKG ATRIAL RATE: 70 BPM
EKG P AXIS: 55 DEGREES
EKG P-R INTERVAL: 198 MS
EKG Q-T INTERVAL: 404 MS
EKG QRS DURATION: 102 MS
EKG QTC CALCULATION (BAZETT): 436 MS
EKG R AXIS: 43 DEGREES
EKG T AXIS: 29 DEGREES
EKG VENTRICULAR RATE: 70 BPM

## 2019-07-02 ENCOUNTER — APPOINTMENT (OUTPATIENT)
Dept: CT IMAGING | Age: 44
End: 2019-07-02
Payer: COMMERCIAL

## 2019-07-02 ENCOUNTER — HOSPITAL ENCOUNTER (EMERGENCY)
Age: 44
Discharge: HOME | End: 2019-07-02
Attending: EMERGENCY MEDICINE
Payer: COMMERCIAL

## 2019-07-02 VITALS
OXYGEN SATURATION: 98 % | HEIGHT: 68 IN | WEIGHT: 293 LBS | TEMPERATURE: 98.2 F | DIASTOLIC BLOOD PRESSURE: 90 MMHG | HEART RATE: 69 BPM | BODY MASS INDEX: 44.41 KG/M2 | RESPIRATION RATE: 16 BRPM | SYSTOLIC BLOOD PRESSURE: 132 MMHG

## 2019-07-02 DIAGNOSIS — R10.84 GENERALIZED ABDOMINAL PAIN: Primary | ICD-10-CM

## 2019-07-02 LAB
ALBUMIN SERPL-MCNC: 4.2 G/DL (ref 3.5–5.2)
ALP BLD-CCNC: 48 U/L (ref 35–104)
ALT SERPL-CCNC: 37 U/L (ref 0–32)
ANION GAP SERPL CALCULATED.3IONS-SCNC: 13 MMOL/L (ref 7–16)
AST SERPL-CCNC: 25 U/L (ref 0–31)
BASOPHILS ABSOLUTE: 0.05 E9/L (ref 0–0.2)
BASOPHILS RELATIVE PERCENT: 0.9 % (ref 0–2)
BILIRUB SERPL-MCNC: 0.4 MG/DL (ref 0–1.2)
BUN BLDV-MCNC: 19 MG/DL (ref 6–20)
CALCIUM SERPL-MCNC: 8.6 MG/DL (ref 8.6–10.2)
CHLORIDE BLD-SCNC: 104 MMOL/L (ref 98–107)
CO2: 25 MMOL/L (ref 22–29)
CREAT SERPL-MCNC: 0.8 MG/DL (ref 0.5–1)
EOSINOPHILS ABSOLUTE: 0.19 E9/L (ref 0.05–0.5)
EOSINOPHILS RELATIVE PERCENT: 3.4 % (ref 0–6)
GFR AFRICAN AMERICAN: >60
GFR NON-AFRICAN AMERICAN: >60 ML/MIN/1.73
GLUCOSE BLD-MCNC: 97 MG/DL (ref 74–99)
HCT VFR BLD CALC: 38.1 % (ref 34–48)
HEMOGLOBIN: 13.2 G/DL (ref 11.5–15.5)
IMMATURE GRANULOCYTES #: 0.03 E9/L
IMMATURE GRANULOCYTES %: 0.5 % (ref 0–5)
LYMPHOCYTES ABSOLUTE: 2.03 E9/L (ref 1.5–4)
LYMPHOCYTES RELATIVE PERCENT: 35.9 % (ref 20–42)
MCH RBC QN AUTO: 31.6 PG (ref 26–35)
MCHC RBC AUTO-ENTMCNC: 34.6 % (ref 32–34.5)
MCV RBC AUTO: 91.1 FL (ref 80–99.9)
MONOCYTES ABSOLUTE: 0.37 E9/L (ref 0.1–0.95)
MONOCYTES RELATIVE PERCENT: 6.5 % (ref 2–12)
NEUTROPHILS ABSOLUTE: 2.98 E9/L (ref 1.8–7.3)
NEUTROPHILS RELATIVE PERCENT: 52.8 % (ref 43–80)
PDW BLD-RTO: 11.9 FL (ref 11.5–15)
PLATELET # BLD: 196 E9/L (ref 130–450)
PMV BLD AUTO: 9.7 FL (ref 7–12)
POTASSIUM REFLEX MAGNESIUM: 3.6 MMOL/L (ref 3.5–5)
RBC # BLD: 4.18 E12/L (ref 3.5–5.5)
SODIUM BLD-SCNC: 142 MMOL/L (ref 132–146)
TOTAL PROTEIN: 6.9 G/DL (ref 6.4–8.3)
WBC # BLD: 5.7 E9/L (ref 4.5–11.5)

## 2019-07-02 PROCEDURE — 99284 EMERGENCY DEPT VISIT MOD MDM: CPT

## 2019-07-02 PROCEDURE — 74177 CT ABD & PELVIS W/CONTRAST: CPT

## 2019-07-02 PROCEDURE — 96374 THER/PROPH/DIAG INJ IV PUSH: CPT

## 2019-07-02 PROCEDURE — 96375 TX/PRO/DX INJ NEW DRUG ADDON: CPT

## 2019-07-02 PROCEDURE — 85025 COMPLETE CBC W/AUTO DIFF WBC: CPT

## 2019-07-02 PROCEDURE — 6360000002 HC RX W HCPCS: Performed by: EMERGENCY MEDICINE

## 2019-07-02 PROCEDURE — 2580000003 HC RX 258: Performed by: EMERGENCY MEDICINE

## 2019-07-02 PROCEDURE — 80053 COMPREHEN METABOLIC PANEL: CPT

## 2019-07-02 PROCEDURE — 6360000004 HC RX CONTRAST MEDICATION: Performed by: RADIOLOGY

## 2019-07-02 RX ORDER — 0.9 % SODIUM CHLORIDE 0.9 %
1000 INTRAVENOUS SOLUTION INTRAVENOUS ONCE
Status: COMPLETED | OUTPATIENT
Start: 2019-07-02 | End: 2019-07-02

## 2019-07-02 RX ORDER — DICYCLOMINE HYDROCHLORIDE 10 MG/1
20 CAPSULE ORAL
Qty: 15 CAPSULE | Refills: 0 | Status: SHIPPED | OUTPATIENT
Start: 2019-07-02 | End: 2021-09-27

## 2019-07-02 RX ORDER — KETOROLAC TROMETHAMINE 30 MG/ML
15 INJECTION, SOLUTION INTRAMUSCULAR; INTRAVENOUS ONCE
Status: COMPLETED | OUTPATIENT
Start: 2019-07-02 | End: 2019-07-02

## 2019-07-02 RX ORDER — ONDANSETRON 2 MG/ML
4 INJECTION INTRAMUSCULAR; INTRAVENOUS ONCE
Status: COMPLETED | OUTPATIENT
Start: 2019-07-02 | End: 2019-07-02

## 2019-07-02 RX ADMIN — KETOROLAC TROMETHAMINE 15 MG: 30 INJECTION, SOLUTION INTRAMUSCULAR; INTRAVENOUS at 10:43

## 2019-07-02 RX ADMIN — IOPAMIDOL 100 ML: 755 INJECTION, SOLUTION INTRAVENOUS at 11:41

## 2019-07-02 RX ADMIN — SODIUM CHLORIDE 1000 ML: 9 INJECTION, SOLUTION INTRAVENOUS at 10:43

## 2019-07-02 RX ADMIN — ONDANSETRON 4 MG: 2 INJECTION INTRAMUSCULAR; INTRAVENOUS at 10:43

## 2019-07-02 ASSESSMENT — PAIN DESCRIPTION - DESCRIPTORS: DESCRIPTORS: BURNING;SHARP

## 2019-07-02 ASSESSMENT — PAIN SCALES - GENERAL
PAINLEVEL_OUTOF10: 9
PAINLEVEL_OUTOF10: 9
PAINLEVEL_OUTOF10: 8

## 2019-07-02 ASSESSMENT — PAIN DESCRIPTION - FREQUENCY: FREQUENCY: CONTINUOUS

## 2019-07-02 ASSESSMENT — PAIN DESCRIPTION - LOCATION
LOCATION: ABDOMEN
LOCATION: ABDOMEN

## 2019-07-02 ASSESSMENT — PAIN DESCRIPTION - PROGRESSION: CLINICAL_PROGRESSION: GRADUALLY IMPROVING

## 2019-07-02 NOTE — ED PROVIDER NOTES
are nonspecific and could   reflect an ovarian remnant cyst or mass/malignancy. Possibility of a   mass or malignancy arising from the colon is felt less likely but not   completely excluded based on this imaging. 2. No evident diverticulitis is definitively identified. No bowel   obstruction. 3. Moderately severe diffuse fatty infiltration of liver. 4. Pleural-based pulmonary nodule right lung measuring 0.5 cm, new   since previous exam, continued CT surveillance within one to 2 months   recommended. 5. Mild sigmoid colon diverticulosis.          ------------------------- NURSING NOTES AND VITALS REVIEWED ---------------------------   The nursing notes within the ED encounter and vital signs as below have been reviewed. BP (!) 162/80   Pulse 82   Temp 98.2 °F (36.8 °C) (Oral)   Resp 16   Ht 5' 8\" (1.727 m)   Wt (!) 347 lb (157.4 kg)   SpO2 94%   BMI 52.76 kg/m²   Oxygen Saturation Interpretation: Normal      ---------------------------------------------------PHYSICAL EXAM--------------------------------------      Constitutional/General: Alert and oriented x3, well appearing, non toxic in NAD  Head: NC/AT  Eyes: PERRL, EOMI  Mouth: Oropharynx clear, handling secretions, no trismus  Neck: Supple, full ROM,   Pulmonary: Lungs clear to auscultation bilaterally, no wheezes, rales, or rhonchi. Not in respiratory distress  Cardiovascular:  Regular rate and rhythm, no murmurs, gallops, or rubs. 2+ distal pulses  Abdomen: Soft, some mild diffuse tenderness without rebound does appear to be some minor focality in the left mid and lower quadrants. Extremities: Moves all extremities x 4.  Warm and well perfused  Skin: warm and dry without rash  Neurologic: GCS 15,  Psych: Normal Affect      ------------------------------ ED COURSE/MEDICAL DECISION MAKING----------------------  Medications   0.9 % sodium chloride bolus (0 mLs Intravenous Stopped 7/2/19 5017)   ondansetron (ZOFRAN) injection 4 mg (4 mg

## 2019-07-24 ENCOUNTER — HOSPITAL ENCOUNTER (OUTPATIENT)
Age: 44
Discharge: HOME | End: 2019-07-26
Payer: COMMERCIAL

## 2019-07-24 LAB
CA 125: 11.3 U/ML (ref 0–35)
FOLLICLE STIMULATING HORMONE: 28.6 MIU/ML

## 2019-07-24 PROCEDURE — 83001 ASSAY OF GONADOTROPIN (FSH): CPT

## 2019-07-24 PROCEDURE — 86304 IMMUNOASSAY TUMOR CA 125: CPT

## 2019-11-07 ENCOUNTER — APPOINTMENT (OUTPATIENT)
Dept: CT IMAGING | Age: 44
End: 2019-11-07
Payer: COMMERCIAL

## 2019-11-07 ENCOUNTER — HOSPITAL ENCOUNTER (EMERGENCY)
Age: 44
Discharge: HOME | End: 2019-11-07
Attending: FAMILY MEDICINE
Payer: COMMERCIAL

## 2019-11-07 ENCOUNTER — APPOINTMENT (OUTPATIENT)
Dept: GENERAL RADIOLOGY | Age: 44
End: 2019-11-07
Payer: COMMERCIAL

## 2019-11-07 VITALS
SYSTOLIC BLOOD PRESSURE: 150 MMHG | WEIGHT: 293 LBS | DIASTOLIC BLOOD PRESSURE: 88 MMHG | RESPIRATION RATE: 16 BRPM | OXYGEN SATURATION: 96 % | HEART RATE: 68 BPM | HEIGHT: 68 IN | BODY MASS INDEX: 44.41 KG/M2 | TEMPERATURE: 98.6 F

## 2019-11-07 DIAGNOSIS — K57.32 DIVERTICULITIS OF COLON: Primary | ICD-10-CM

## 2019-11-07 DIAGNOSIS — N83.8 OVARIAN MASS, LEFT: ICD-10-CM

## 2019-11-07 LAB
ALBUMIN SERPL-MCNC: 4.5 G/DL (ref 3.5–5.2)
ALP BLD-CCNC: 60 U/L (ref 35–104)
ALT SERPL-CCNC: 59 U/L (ref 0–32)
ANION GAP SERPL CALCULATED.3IONS-SCNC: 10 MMOL/L (ref 7–16)
AST SERPL-CCNC: 27 U/L (ref 0–31)
BASOPHILS ABSOLUTE: 0.03 E9/L (ref 0–0.2)
BASOPHILS RELATIVE PERCENT: 0.4 % (ref 0–2)
BILIRUB SERPL-MCNC: 0.7 MG/DL (ref 0–1.2)
BILIRUBIN URINE: NEGATIVE
BLOOD, URINE: NEGATIVE
BUN BLDV-MCNC: 13 MG/DL (ref 6–20)
CALCIUM SERPL-MCNC: 9.5 MG/DL (ref 8.6–10.2)
CHLORIDE BLD-SCNC: 104 MMOL/L (ref 98–107)
CLARITY: CLEAR
CO2: 29 MMOL/L (ref 22–29)
COLOR: YELLOW
CREAT SERPL-MCNC: 0.8 MG/DL (ref 0.5–1)
EOSINOPHILS ABSOLUTE: 0.36 E9/L (ref 0.05–0.5)
EOSINOPHILS RELATIVE PERCENT: 4.7 % (ref 0–6)
GFR AFRICAN AMERICAN: >60
GFR NON-AFRICAN AMERICAN: >60 ML/MIN/1.73
GLUCOSE BLD-MCNC: 111 MG/DL (ref 74–99)
GLUCOSE URINE: NEGATIVE MG/DL
HCT VFR BLD CALC: 36.9 % (ref 34–48)
HEMOGLOBIN: 12.5 G/DL (ref 11.5–15.5)
IMMATURE GRANULOCYTES #: 0.03 E9/L
IMMATURE GRANULOCYTES %: 0.4 % (ref 0–5)
KETONES, URINE: NEGATIVE MG/DL
LEUKOCYTE ESTERASE, URINE: NEGATIVE
LIPASE: 32 U/L (ref 13–60)
LYMPHOCYTES ABSOLUTE: 1.45 E9/L (ref 1.5–4)
LYMPHOCYTES RELATIVE PERCENT: 19.1 % (ref 20–42)
MCH RBC QN AUTO: 32.5 PG (ref 26–35)
MCHC RBC AUTO-ENTMCNC: 33.9 % (ref 32–34.5)
MCV RBC AUTO: 95.8 FL (ref 80–99.9)
MONOCYTES ABSOLUTE: 0.42 E9/L (ref 0.1–0.95)
MONOCYTES RELATIVE PERCENT: 5.5 % (ref 2–12)
NEUTROPHILS ABSOLUTE: 5.29 E9/L (ref 1.8–7.3)
NEUTROPHILS RELATIVE PERCENT: 69.9 % (ref 43–80)
NITRITE, URINE: NEGATIVE
PDW BLD-RTO: 12.8 FL (ref 11.5–15)
PH UA: 6 (ref 5–9)
PLATELET # BLD: 199 E9/L (ref 130–450)
PMV BLD AUTO: 10.2 FL (ref 7–12)
POTASSIUM REFLEX MAGNESIUM: 3.6 MMOL/L (ref 3.5–5)
PROTEIN UA: NEGATIVE MG/DL
RBC # BLD: 3.85 E12/L (ref 3.5–5.5)
SODIUM BLD-SCNC: 143 MMOL/L (ref 132–146)
SPECIFIC GRAVITY UA: <=1.005 (ref 1–1.03)
TOTAL PROTEIN: 7.2 G/DL (ref 6.4–8.3)
UROBILINOGEN, URINE: 0.2 E.U./DL
WBC # BLD: 7.6 E9/L (ref 4.5–11.5)

## 2019-11-07 PROCEDURE — 96365 THER/PROPH/DIAG IV INF INIT: CPT

## 2019-11-07 PROCEDURE — 74176 CT ABD & PELVIS W/O CONTRAST: CPT

## 2019-11-07 PROCEDURE — 6360000002 HC RX W HCPCS: Performed by: FAMILY MEDICINE

## 2019-11-07 PROCEDURE — 36415 COLL VENOUS BLD VENIPUNCTURE: CPT

## 2019-11-07 PROCEDURE — 80053 COMPREHEN METABOLIC PANEL: CPT

## 2019-11-07 PROCEDURE — 83690 ASSAY OF LIPASE: CPT

## 2019-11-07 PROCEDURE — 71045 X-RAY EXAM CHEST 1 VIEW: CPT

## 2019-11-07 PROCEDURE — 2580000003 HC RX 258: Performed by: FAMILY MEDICINE

## 2019-11-07 PROCEDURE — 96375 TX/PRO/DX INJ NEW DRUG ADDON: CPT

## 2019-11-07 PROCEDURE — 81003 URINALYSIS AUTO W/O SCOPE: CPT

## 2019-11-07 PROCEDURE — 99284 EMERGENCY DEPT VISIT MOD MDM: CPT

## 2019-11-07 PROCEDURE — 85025 COMPLETE CBC W/AUTO DIFF WBC: CPT

## 2019-11-07 RX ORDER — ONDANSETRON 2 MG/ML
4 INJECTION INTRAMUSCULAR; INTRAVENOUS ONCE
Status: COMPLETED | OUTPATIENT
Start: 2019-11-07 | End: 2019-11-07

## 2019-11-07 RX ORDER — KETOROLAC TROMETHAMINE 30 MG/ML
30 INJECTION, SOLUTION INTRAMUSCULAR; INTRAVENOUS ONCE
Status: COMPLETED | OUTPATIENT
Start: 2019-11-07 | End: 2019-11-07

## 2019-11-07 RX ORDER — AMOXICILLIN AND CLAVULANATE POTASSIUM 875; 125 MG/1; MG/1
1 TABLET, FILM COATED ORAL 2 TIMES DAILY
Qty: 20 TABLET | Refills: 0 | Status: SHIPPED | OUTPATIENT
Start: 2019-11-07 | End: 2019-11-17

## 2019-11-07 RX ORDER — ONDANSETRON 4 MG/1
4 TABLET, ORALLY DISINTEGRATING ORAL EVERY 8 HOURS PRN
Qty: 10 TABLET | Refills: 0 | Status: SHIPPED | OUTPATIENT
Start: 2019-11-07 | End: 2020-11-06

## 2019-11-07 RX ORDER — ACETAMINOPHEN 500 MG
1000 TABLET ORAL EVERY 8 HOURS PRN
Qty: 50 TABLET | Refills: 0 | Status: SHIPPED | OUTPATIENT
Start: 2019-11-07 | End: 2021-09-27

## 2019-11-07 RX ORDER — IBUPROFEN 400 MG/1
400 TABLET ORAL EVERY 8 HOURS PRN
Qty: 12 TABLET | Refills: 0 | Status: SHIPPED | OUTPATIENT
Start: 2019-11-07 | End: 2021-09-27

## 2019-11-07 RX ADMIN — ONDANSETRON 4 MG: 2 INJECTION INTRAMUSCULAR; INTRAVENOUS at 08:07

## 2019-11-07 RX ADMIN — KETOROLAC TROMETHAMINE 30 MG: 30 INJECTION, SOLUTION INTRAMUSCULAR; INTRAVENOUS at 08:08

## 2019-11-07 RX ADMIN — AMPICILLIN SODIUM AND SULBACTAM SODIUM 3 G: 2; 1 INJECTION, POWDER, FOR SOLUTION INTRAMUSCULAR; INTRAVENOUS at 09:14

## 2019-11-07 ASSESSMENT — PAIN DESCRIPTION - ONSET: ONSET: ON-GOING

## 2019-11-07 ASSESSMENT — PAIN SCALES - GENERAL
PAINLEVEL_OUTOF10: 6
PAINLEVEL_OUTOF10: 10
PAINLEVEL_OUTOF10: 10

## 2019-11-07 ASSESSMENT — PAIN DESCRIPTION - PROGRESSION: CLINICAL_PROGRESSION: GRADUALLY WORSENING

## 2019-11-07 ASSESSMENT — PAIN DESCRIPTION - PAIN TYPE: TYPE: ACUTE PAIN

## 2019-11-07 ASSESSMENT — PAIN DESCRIPTION - FREQUENCY: FREQUENCY: CONTINUOUS

## 2019-11-07 ASSESSMENT — PAIN DESCRIPTION - ORIENTATION: ORIENTATION: LEFT;LOWER

## 2019-11-07 ASSESSMENT — PAIN DESCRIPTION - LOCATION: LOCATION: ABDOMEN

## 2019-11-07 ASSESSMENT — PAIN DESCRIPTION - DESCRIPTORS: DESCRIPTORS: BURNING;SHARP

## 2019-11-07 ASSESSMENT — PAIN - FUNCTIONAL ASSESSMENT: PAIN_FUNCTIONAL_ASSESSMENT: PREVENTS OR INTERFERES SOME ACTIVE ACTIVITIES AND ADLS

## 2020-06-22 ENCOUNTER — HOSPITAL ENCOUNTER (EMERGENCY)
Age: 45
Discharge: ANOTHER HOSPITAL | End: 2020-06-23
Attending: EMERGENCY MEDICINE
Payer: COMMERCIAL

## 2020-06-22 LAB
ACETAMINOPHEN LEVEL: <5 MCG/ML (ref 10–30)
ALBUMIN SERPL-MCNC: 4.1 G/DL (ref 3.5–5.2)
ALP BLD-CCNC: 55 U/L (ref 35–104)
ALT SERPL-CCNC: 41 U/L (ref 0–32)
AMPHETAMINE SCREEN, URINE: NOT DETECTED
ANION GAP SERPL CALCULATED.3IONS-SCNC: 11 MMOL/L (ref 7–16)
AST SERPL-CCNC: 23 U/L (ref 0–31)
BARBITURATE SCREEN URINE: NOT DETECTED
BASOPHILS ABSOLUTE: 0.06 E9/L (ref 0–0.2)
BASOPHILS RELATIVE PERCENT: 1.1 % (ref 0–2)
BENZODIAZEPINE SCREEN, URINE: NOT DETECTED
BILIRUB SERPL-MCNC: 0.3 MG/DL (ref 0–1.2)
BUN BLDV-MCNC: 16 MG/DL (ref 6–20)
CALCIUM SERPL-MCNC: 8.4 MG/DL (ref 8.6–10.2)
CANNABINOID SCREEN URINE: NOT DETECTED
CHLORIDE BLD-SCNC: 106 MMOL/L (ref 98–107)
CO2: 26 MMOL/L (ref 22–29)
COCAINE METABOLITE SCREEN URINE: NOT DETECTED
CREAT SERPL-MCNC: 0.7 MG/DL (ref 0.5–1)
EOSINOPHILS ABSOLUTE: 0.34 E9/L (ref 0.05–0.5)
EOSINOPHILS RELATIVE PERCENT: 6.4 % (ref 0–6)
ETHANOL: 218 MG/DL (ref 0–0.08)
FENTANYL SCREEN, URINE: NOT DETECTED
GFR AFRICAN AMERICAN: >60
GFR NON-AFRICAN AMERICAN: >60 ML/MIN/1.73
GLUCOSE BLD-MCNC: 130 MG/DL (ref 74–99)
HCT VFR BLD CALC: 38.7 % (ref 34–48)
HEMOGLOBIN: 12.9 G/DL (ref 11.5–15.5)
IMMATURE GRANULOCYTES #: 0.05 E9/L
IMMATURE GRANULOCYTES %: 0.9 % (ref 0–5)
LIPASE: 36 U/L (ref 13–60)
LYMPHOCYTES ABSOLUTE: 2.03 E9/L (ref 1.5–4)
LYMPHOCYTES RELATIVE PERCENT: 38 % (ref 20–42)
Lab: NORMAL
MCH RBC QN AUTO: 31.5 PG (ref 26–35)
MCHC RBC AUTO-ENTMCNC: 33.3 % (ref 32–34.5)
MCV RBC AUTO: 94.6 FL (ref 80–99.9)
METHADONE SCREEN, URINE: NOT DETECTED
MONOCYTES ABSOLUTE: 0.32 E9/L (ref 0.1–0.95)
MONOCYTES RELATIVE PERCENT: 6 % (ref 2–12)
NEUTROPHILS ABSOLUTE: 2.54 E9/L (ref 1.8–7.3)
NEUTROPHILS RELATIVE PERCENT: 47.6 % (ref 43–80)
OPIATE SCREEN URINE: NOT DETECTED
OXYCODONE URINE: NOT DETECTED
PDW BLD-RTO: 12.2 FL (ref 11.5–15)
PHENCYCLIDINE SCREEN URINE: NOT DETECTED
PLATELET # BLD: 177 E9/L (ref 130–450)
PMV BLD AUTO: 10.1 FL (ref 7–12)
POTASSIUM REFLEX MAGNESIUM: 3.8 MMOL/L (ref 3.5–5)
RBC # BLD: 4.09 E12/L (ref 3.5–5.5)
SALICYLATE, SERUM: 3.2 MG/DL (ref 0–30)
SODIUM BLD-SCNC: 143 MMOL/L (ref 132–146)
TOTAL PROTEIN: 6.3 G/DL (ref 6.4–8.3)
TRICYCLIC ANTIDEPRESSANTS SCREEN SERUM: NEGATIVE NG/ML
WBC # BLD: 5.3 E9/L (ref 4.5–11.5)

## 2020-06-22 PROCEDURE — 83690 ASSAY OF LIPASE: CPT

## 2020-06-22 PROCEDURE — 6370000000 HC RX 637 (ALT 250 FOR IP): Performed by: EMERGENCY MEDICINE

## 2020-06-22 PROCEDURE — 80053 COMPREHEN METABOLIC PANEL: CPT

## 2020-06-22 PROCEDURE — 80307 DRUG TEST PRSMV CHEM ANLYZR: CPT

## 2020-06-22 PROCEDURE — 2580000003 HC RX 258: Performed by: EMERGENCY MEDICINE

## 2020-06-22 PROCEDURE — 96374 THER/PROPH/DIAG INJ IV PUSH: CPT

## 2020-06-22 PROCEDURE — 6360000002 HC RX W HCPCS

## 2020-06-22 PROCEDURE — 85025 COMPLETE CBC W/AUTO DIFF WBC: CPT

## 2020-06-22 PROCEDURE — G0480 DRUG TEST DEF 1-7 CLASSES: HCPCS

## 2020-06-22 PROCEDURE — 99285 EMERGENCY DEPT VISIT HI MDM: CPT

## 2020-06-22 RX ORDER — DULOXETIN HYDROCHLORIDE 60 MG/1
60 CAPSULE, DELAYED RELEASE ORAL DAILY
COMMUNITY

## 2020-06-22 RX ORDER — ONDANSETRON 2 MG/ML
4 INJECTION INTRAMUSCULAR; INTRAVENOUS ONCE
Status: COMPLETED | OUTPATIENT
Start: 2020-06-22 | End: 2020-06-22

## 2020-06-22 RX ORDER — DULOXETIN HYDROCHLORIDE 20 MG/1
20 CAPSULE, DELAYED RELEASE ORAL ONCE
Status: COMPLETED | OUTPATIENT
Start: 2020-06-22 | End: 2020-06-22

## 2020-06-22 RX ORDER — 0.9 % SODIUM CHLORIDE 0.9 %
1000 INTRAVENOUS SOLUTION INTRAVENOUS ONCE
Status: COMPLETED | OUTPATIENT
Start: 2020-06-22 | End: 2020-06-22

## 2020-06-22 RX ORDER — ONDANSETRON 2 MG/ML
INJECTION INTRAMUSCULAR; INTRAVENOUS
Status: COMPLETED
Start: 2020-06-22 | End: 2020-06-22

## 2020-06-22 RX ORDER — GABAPENTIN 300 MG/1
600 CAPSULE ORAL 3 TIMES DAILY
Status: DISCONTINUED | OUTPATIENT
Start: 2020-06-22 | End: 2020-06-23 | Stop reason: HOSPADM

## 2020-06-22 RX ORDER — HYDROCHLOROTHIAZIDE 25 MG/1
25 TABLET ORAL ONCE
Status: COMPLETED | OUTPATIENT
Start: 2020-06-22 | End: 2020-06-22

## 2020-06-22 RX ADMIN — HYDROCHLOROTHIAZIDE 25 MG: 25 TABLET ORAL at 23:20

## 2020-06-22 RX ADMIN — SODIUM CHLORIDE 1000 ML: 9 INJECTION, SOLUTION INTRAVENOUS at 16:43

## 2020-06-22 RX ADMIN — SODIUM CHLORIDE 1000 ML: 9 INJECTION, SOLUTION INTRAVENOUS at 18:49

## 2020-06-22 RX ADMIN — ONDANSETRON 4 MG: 2 INJECTION INTRAMUSCULAR; INTRAVENOUS at 17:46

## 2020-06-22 RX ADMIN — DULOXETINE HYDROCHLORIDE 20 MG: 20 CAPSULE, DELAYED RELEASE ORAL at 23:20

## 2020-06-22 RX ADMIN — GABAPENTIN 600 MG: 300 CAPSULE ORAL at 21:25

## 2020-06-22 ASSESSMENT — ENCOUNTER SYMPTOMS
ALLERGIC/IMMUNOLOGIC NEGATIVE: 1
EYES NEGATIVE: 1
VOMITING: 0
ABDOMINAL PAIN: 0
SHORTNESS OF BREATH: 0
BOWEL INCONTINENCE: 0
RESPIRATORY NEGATIVE: 1
NAUSEA: 0

## 2020-06-22 NOTE — ED PROVIDER NOTES
This is a 50years old female with a past medical history significant for obesity and alcohol abuse, who presented to our emergency department and requesting help for alcohol abuse. Patient last alcohol abuse was last night. Patient has she also denies to be pregnant. Been abusing alcohol for past 2 years. Patient denies suicidal ideation homicidal ideation. Patient denies chest pain shortness of breath or abdominal pain. Patient denies any other complaints at this time. The history is provided by the patient. No  was used. Alcohol Intoxication   Similar prior episodes: yes    Severity:  Moderate  Onset quality:  Gradual  Timing:  Intermittent  Progression:  Unchanged  Chronicity:  Chronic  Suspected agents:  Alcohol  Associated symptoms: no abdominal pain, no agitation, no blackouts, no bladder incontinence, no bowel incontinence, no confusion, no hallucinations, no headaches, no loss of consciousness, no nausea, no palpitations, no seizures, no shortness of breath, no somnolence, no suicidal ideation, no violence, no vomiting and no weakness    Risk factors: no addiction treatment, no chronic illness, no mental illness, no recent illness and no recent infection         Review of Systems   HENT: Negative. Eyes: Negative. Respiratory: Negative. Negative for shortness of breath. Cardiovascular: Negative for palpitations. Gastrointestinal: Negative for abdominal pain, bowel incontinence, nausea and vomiting. Endocrine: Negative. Genitourinary: Negative. Negative for bladder incontinence. Allergic/Immunologic: Negative. Neurological: Negative. Negative for seizures, loss of consciousness, weakness and headaches. Psychiatric/Behavioral: Negative for agitation, confusion, hallucinations and suicidal ideas. All other systems reviewed and are negative. Physical Exam  Vitals signs and nursing note reviewed.    Constitutional:       General: She is in acute 25 ng/mL    Methadone Screen, Urine NOT DETECTED Negative <300 ng/mL    Oxycodone Urine NOT DETECTED Negative <100 ng/mL    FENTANYL SCREEN, URINE NOT DETECTED Negative <1 ng/mL    Drug Screen Comment: see below        Radiology:  No orders to display       ------------------------- NURSING NOTES AND VITALS REVIEWED ---------------------------  Date / Time Roomed:  6/22/2020  4:17 PM  ED Bed Assignment:  24/24    The nursing notes within the ED encounter and vital signs as below have been reviewed. BP (!) 160/95   Pulse 90   Temp 98.4 °F (36.9 °C) (Oral)   Resp 16   Ht 5' 8\" (1.727 m)   Wt (!) 380 lb (172.4 kg)   SpO2 99%   BMI 57.78 kg/m²   Oxygen Saturation Interpretation: Normal      ------------------------------------------ PROGRESS NOTES ------------------------------------------  I have spoken with the patient and discussed todays results, in addition to providing specific details for the plan of care and counseling regarding the diagnosis and prognosis. Their questions are answered at this time and they are agreeable with the plan. I discussed at length with them reasons for immediate return here for re evaluation. They will followup with primary care by calling their office tomorrow. --------------------------------- ADDITIONAL PROVIDER NOTES ---------------------------------  At this time the patient is without objective evidence of an acute process requiring hospitalization or inpatient management. They have remained hemodynamically stable throughout their entire ED visit and are stable for discharge with outpatient follow-up. The plan has been discussed in detail and they are aware of the specific conditions for emergent return, as well as the importance of follow-up. New Prescriptions    No medications on file       Diagnosis:  1. Alcohol abuse Stable       Disposition:  Patient's disposition: Discharge to home  Patient's condition is stable.                 Alize Alonzo Jun Chirinos,   06/22/20 2020

## 2020-06-22 NOTE — CARE COORDINATION
Social Work/Transition of Care:    SW received call back from [facility] stating she has reviewed with their physician, pt can be accepted once her Blood Pressure is lowered, once facility receives updated vitals, Serum and Urine Drug screen. Accepting information will be provided. NOHEMI updated ED Nurse with [facility] #[phone] and Fax # [phone].     Electronically signed by Jessica Jones on 5/10/8648 at 7:26 PM

## 2020-06-22 NOTE — ED NOTES
Bed: 24  Expected date:   Expected time:   Means of arrival:   Comments:  ems     Grady Tomlinson RN  06/22/20 4131

## 2020-06-23 VITALS
HEART RATE: 78 BPM | HEIGHT: 68 IN | WEIGHT: 293 LBS | BODY MASS INDEX: 44.41 KG/M2 | TEMPERATURE: 98.4 F | DIASTOLIC BLOOD PRESSURE: 89 MMHG | RESPIRATION RATE: 16 BRPM | OXYGEN SATURATION: 97 % | SYSTOLIC BLOOD PRESSURE: 150 MMHG

## 2020-06-23 NOTE — ED NOTES
[facility]. Awaiting a call back on patients acceptance.       Blsa Galindo RN  06/22/20 3333

## 2020-06-23 NOTE — ED NOTES
Assumed care of pt. Pt is resting in the bed at this time. Pt takes medication as ordered in the [facility]. Pt asked about her placement at this time. Rn states that we are awaiting a call back from their  with a bed placement at this time. Will continue to monitor.      Sea Matias RN  06/22/20 5009

## 2020-06-23 NOTE — ED NOTES
Pt updated on POC and transfer. Pt si resting in bed and she is calm and cooperative at this time.  Will continue to monitor till transfer     Marilin Gan RN  06/23/20 2328

## 2020-06-23 NOTE — ED NOTES
Spoke with lisbet from [facility].  He is reviewing patient info and will call back in 30min with information on patients acceptance or denial.      Dolly Cruz RN  06/22/20 4003

## 2020-06-23 NOTE — ED NOTES
Fabrizio Hall from [facility] called and stated that they would be accepting the pt with the current BP. Fabrizio Hall states that [facility] and will be assigned a bed when she arrives there. Number for nurse to nurse is [phone]. Accepting physician will be Dr. Asiya Gonzalez. Access center called to arrange transport. Will continue to monitor.      Gume Rodriguez RN  06/22/20 9824

## 2021-09-27 ENCOUNTER — APPOINTMENT (OUTPATIENT)
Dept: CT IMAGING | Age: 46
DRG: 065 | End: 2021-09-27
Payer: COMMERCIAL

## 2021-09-27 ENCOUNTER — APPOINTMENT (OUTPATIENT)
Dept: GENERAL RADIOLOGY | Age: 46
DRG: 065 | End: 2021-09-27
Payer: COMMERCIAL

## 2021-09-27 ENCOUNTER — HOSPITAL ENCOUNTER (INPATIENT)
Age: 46
LOS: 9 days | Discharge: ANOTHER HOSPITAL | DRG: 065 | End: 2021-10-06
Attending: EMERGENCY MEDICINE | Admitting: INTERNAL MEDICINE
Payer: COMMERCIAL

## 2021-09-27 DIAGNOSIS — I60.9 SAH (SUBARACHNOID HEMORRHAGE) (HCC): Primary | ICD-10-CM

## 2021-09-27 PROBLEM — E87.20 LACTIC ACID ACIDOSIS: Status: ACTIVE | Noted: 2021-09-27

## 2021-09-27 PROBLEM — R56.9 SEIZURE (HCC): Status: ACTIVE | Noted: 2021-09-27

## 2021-09-27 PROBLEM — E87.6 HYPOKALEMIA: Status: ACTIVE | Noted: 2021-09-27

## 2021-09-27 LAB
ACETAMINOPHEN LEVEL: <5 MCG/ML (ref 10–30)
ALBUMIN SERPL-MCNC: 4.2 G/DL (ref 3.5–5.2)
ALP BLD-CCNC: 111 U/L (ref 35–104)
ALT SERPL-CCNC: 35 U/L (ref 0–32)
AMPHETAMINE SCREEN, URINE: NOT DETECTED
ANION GAP SERPL CALCULATED.3IONS-SCNC: 12 MMOL/L (ref 7–16)
ANION GAP SERPL CALCULATED.3IONS-SCNC: 16 MMOL/L (ref 7–16)
APPEARANCE CSF: ABNORMAL
APPEARANCE CSF: ABNORMAL
APTT: 21.2 SEC (ref 24.5–35.1)
AST SERPL-CCNC: 33 U/L (ref 0–31)
BARBITURATE SCREEN URINE: NOT DETECTED
BASOPHILS ABSOLUTE: 0.04 E9/L (ref 0–0.2)
BASOPHILS RELATIVE PERCENT: 0.5 % (ref 0–2)
BENZODIAZEPINE SCREEN, URINE: NOT DETECTED
BILIRUB SERPL-MCNC: 0.8 MG/DL (ref 0–1.2)
BILIRUBIN URINE: NEGATIVE
BLOOD, URINE: NEGATIVE
BUN BLDV-MCNC: 13 MG/DL (ref 6–20)
BUN BLDV-MCNC: 13 MG/DL (ref 6–20)
CALCIUM SERPL-MCNC: 8.7 MG/DL (ref 8.6–10.2)
CALCIUM SERPL-MCNC: 8.7 MG/DL (ref 8.6–10.2)
CANNABINOID SCREEN URINE: NOT DETECTED
CHLORIDE BLD-SCNC: 100 MMOL/L (ref 98–107)
CHLORIDE BLD-SCNC: 102 MMOL/L (ref 98–107)
CHP ED QC CHECK: NORMAL
CLARITY: CLEAR
CO2: 22 MMOL/L (ref 22–29)
CO2: 25 MMOL/L (ref 22–29)
COCAINE METABOLITE SCREEN URINE: NOT DETECTED
COLOR CSF: ABNORMAL
COLOR CSF: ABNORMAL
COLOR: YELLOW
CREAT SERPL-MCNC: 0.7 MG/DL (ref 0.5–1)
CREAT SERPL-MCNC: 0.8 MG/DL (ref 0.5–1)
CRYPTOCOCCUS NEOFORMANS/GATTII CSF BY PCR: NOT DETECTED
CYTOMEGALOVIRUS CSF BY PCR: NOT DETECTED
EKG ATRIAL RATE: 95 BPM
EKG P AXIS: 60 DEGREES
EKG P-R INTERVAL: 166 MS
EKG Q-T INTERVAL: 408 MS
EKG QRS DURATION: 112 MS
EKG QTC CALCULATION (BAZETT): 512 MS
EKG R AXIS: 54 DEGREES
EKG T AXIS: 36 DEGREES
EKG VENTRICULAR RATE: 95 BPM
ENTEROVIRUS CSF BY PCR: NOT DETECTED
EOSINOPHILS ABSOLUTE: 0.1 E9/L (ref 0.05–0.5)
EOSINOPHILS RELATIVE PERCENT: 1.2 % (ref 0–6)
ESCHERICHIA COLI K1 CSF BY PCR: NOT DETECTED
ETHANOL: <10 MG/DL (ref 0–0.08)
FENTANYL SCREEN, URINE: NOT DETECTED
GFR AFRICAN AMERICAN: >60
GFR AFRICAN AMERICAN: >60
GFR NON-AFRICAN AMERICAN: >60 ML/MIN/1.73
GFR NON-AFRICAN AMERICAN: >60 ML/MIN/1.73
GLUCOSE BLD-MCNC: 125 MG/DL (ref 74–99)
GLUCOSE BLD-MCNC: 143 MG/DL (ref 74–99)
GLUCOSE BLD-MCNC: 144 MG/DL
GLUCOSE URINE: NEGATIVE MG/DL
GLUCOSE, CSF: 19 MG/DL (ref 40–70)
HAEMOPHILUS INFLUENZAE CSF BY PCR: NOT DETECTED
HCT VFR BLD CALC: 33.6 % (ref 34–48)
HEMOGLOBIN: 10.8 G/DL (ref 11.5–15.5)
HERPES SIMPLEX VIRUS 1 CSF BY PCR: NOT DETECTED
HERPES SIMPLEX VIRUS 2 CSF BY PCR: NOT DETECTED
HUMAN HERPESVIRUS 6 CSF BY PCR: NOT DETECTED
HUMAN PARECHOVIRUS CSF BY PCR: NOT DETECTED
IMMATURE GRANULOCYTES #: 0.05 E9/L
IMMATURE GRANULOCYTES %: 0.6 % (ref 0–5)
INFLUENZA A: NOT DETECTED
INFLUENZA B: NOT DETECTED
INR BLD: 1.1
KETONES, URINE: 15 MG/DL
LACTIC ACID: 4 MMOL/L (ref 0.5–2.2)
LACTIC ACID: 4.5 MMOL/L (ref 0.5–2.2)
LEUKOCYTE ESTERASE, URINE: NEGATIVE
LIPASE: 16 U/L (ref 13–60)
LISTERIA MONOCYTOGENES CSF BY PCR: NOT DETECTED
LYMPHOCYTES ABSOLUTE: 0.83 E9/L (ref 1.5–4)
LYMPHOCYTES RELATIVE PERCENT: 10.2 % (ref 20–42)
Lab: ABNORMAL
MCH RBC QN AUTO: 27.2 PG (ref 26–35)
MCHC RBC AUTO-ENTMCNC: 32.1 % (ref 32–34.5)
MCV RBC AUTO: 84.6 FL (ref 80–99.9)
METER GLUCOSE: 144 MG/DL (ref 74–99)
METHADONE SCREEN, URINE: NOT DETECTED
MONOCYTE, CSF: 28 % (ref 10–70)
MONOCYTE, CSF: 6 % (ref 10–70)
MONOCYTES ABSOLUTE: 0.32 E9/L (ref 0.1–0.95)
MONOCYTES RELATIVE PERCENT: 3.9 % (ref 2–12)
NEISSERIA MENINGITIDIS CSF BY PCR: NOT DETECTED
NEUTROPHILS ABSOLUTE: 6.82 E9/L (ref 1.8–7.3)
NEUTROPHILS RELATIVE PERCENT: 83.6 % (ref 43–80)
NEUTROPHILS, CSF: 72 % (ref 0–10)
NEUTROPHILS, CSF: 94 % (ref 0–10)
NITRITE, URINE: NEGATIVE
OPIATE SCREEN URINE: NOT DETECTED
OXYCODONE URINE: POSITIVE
PDW BLD-RTO: 12.9 FL (ref 11.5–15)
PH UA: 7 (ref 5–9)
PHENCYCLIDINE SCREEN URINE: NOT DETECTED
PLATELET # BLD: 213 E9/L (ref 130–450)
PMV BLD AUTO: 10.5 FL (ref 7–12)
POTASSIUM SERPL-SCNC: 3.4 MMOL/L (ref 3.5–5)
POTASSIUM SERPL-SCNC: 3.8 MMOL/L (ref 3.5–5)
PROTEIN CSF: 417 MG/DL (ref 15–40)
PROTEIN UA: NEGATIVE MG/DL
PROTHROMBIN TIME: 11.7 SEC (ref 9.3–12.4)
RBC # BLD: 3.97 E12/L (ref 3.5–5.5)
RBC CSF: ABNORMAL /UL
RBC CSF: ABNORMAL /UL
REASON FOR REJECTION: NORMAL
REJECTED TEST: NORMAL
SALICYLATE, SERUM: <0.3 MG/DL (ref 0–30)
SARS-COV-2 RNA, RT PCR: NOT DETECTED
SODIUM BLD-SCNC: 138 MMOL/L (ref 132–146)
SODIUM BLD-SCNC: 139 MMOL/L (ref 132–146)
SPECIFIC GRAVITY UA: 1.02 (ref 1–1.03)
STREPTOCOCCUS AGALACTIAE CSF BY PCR: NOT DETECTED
STREPTOCOCCUS PNEUMONIAE CSF BY PCR: NOT DETECTED
TOTAL PROTEIN: 6.6 G/DL (ref 6.4–8.3)
TRICYCLIC ANTIDEPRESSANTS SCREEN SERUM: NEGATIVE NG/ML
TROPONIN, HIGH SENSITIVITY: 11 NG/L (ref 0–9)
TROPONIN, HIGH SENSITIVITY: 13 NG/L (ref 0–9)
TUBE NUMBER CSF: ABNORMAL
TUBE NUMBER CSF: ABNORMAL
UROBILINOGEN, URINE: 0.2 E.U./DL
VARICELLA ZOSTER VIRUS CSF BY PCR: NOT DETECTED
WBC # BLD: 8.2 E9/L (ref 4.5–11.5)
WBC CSF: 791 /UL (ref 0–2)
WBC CSF: 8366 /UL (ref 0–2)

## 2021-09-27 PROCEDURE — 70450 CT HEAD/BRAIN W/O DYE: CPT

## 2021-09-27 PROCEDURE — 2580000003 HC RX 258: Performed by: RADIOLOGY

## 2021-09-27 PROCEDURE — 2580000003 HC RX 258: Performed by: EMERGENCY MEDICINE

## 2021-09-27 PROCEDURE — 83690 ASSAY OF LIPASE: CPT

## 2021-09-27 PROCEDURE — 81003 URINALYSIS AUTO W/O SCOPE: CPT

## 2021-09-27 PROCEDURE — 6370000000 HC RX 637 (ALT 250 FOR IP): Performed by: INTERNAL MEDICINE

## 2021-09-27 PROCEDURE — 6360000002 HC RX W HCPCS: Performed by: NURSE PRACTITIONER

## 2021-09-27 PROCEDURE — 80179 DRUG ASSAY SALICYLATE: CPT

## 2021-09-27 PROCEDURE — 6360000004 HC RX CONTRAST MEDICATION: Performed by: RADIOLOGY

## 2021-09-27 PROCEDURE — 6360000002 HC RX W HCPCS: Performed by: INTERNAL MEDICINE

## 2021-09-27 PROCEDURE — 62328 DX LMBR SPI PNXR W/FLUOR/CT: CPT

## 2021-09-27 PROCEDURE — 36415 COLL VENOUS BLD VENIPUNCTURE: CPT

## 2021-09-27 PROCEDURE — 87483 CNS DNA AMP PROBE TYPE 12-25: CPT

## 2021-09-27 PROCEDURE — 87205 SMEAR GRAM STAIN: CPT

## 2021-09-27 PROCEDURE — 6360000002 HC RX W HCPCS: Performed by: EMERGENCY MEDICINE

## 2021-09-27 PROCEDURE — 6360000002 HC RX W HCPCS

## 2021-09-27 PROCEDURE — 2700000000 HC OXYGEN THERAPY PER DAY

## 2021-09-27 PROCEDURE — 85730 THROMBOPLASTIN TIME PARTIAL: CPT

## 2021-09-27 PROCEDURE — 99285 EMERGENCY DEPT VISIT HI MDM: CPT

## 2021-09-27 PROCEDURE — 85610 PROTHROMBIN TIME: CPT

## 2021-09-27 PROCEDURE — 2580000003 HC RX 258: Performed by: NURSE PRACTITIONER

## 2021-09-27 PROCEDURE — 88108 CYTOPATH CONCENTRATE TECH: CPT

## 2021-09-27 PROCEDURE — 71045 X-RAY EXAM CHEST 1 VIEW: CPT

## 2021-09-27 PROCEDURE — 80053 COMPREHEN METABOLIC PANEL: CPT

## 2021-09-27 PROCEDURE — 84484 ASSAY OF TROPONIN QUANT: CPT

## 2021-09-27 PROCEDURE — 87636 SARSCOV2 & INF A&B AMP PRB: CPT

## 2021-09-27 PROCEDURE — 80307 DRUG TEST PRSMV CHEM ANLYZR: CPT

## 2021-09-27 PROCEDURE — 80143 DRUG ASSAY ACETAMINOPHEN: CPT

## 2021-09-27 PROCEDURE — 87070 CULTURE OTHR SPECIMN AEROBIC: CPT

## 2021-09-27 PROCEDURE — 82945 GLUCOSE OTHER FLUID: CPT

## 2021-09-27 PROCEDURE — 99254 IP/OBS CNSLTJ NEW/EST MOD 60: CPT | Performed by: NEUROLOGICAL SURGERY

## 2021-09-27 PROCEDURE — 2500000003 HC RX 250 WO HCPCS: Performed by: EMERGENCY MEDICINE

## 2021-09-27 PROCEDURE — 6370000000 HC RX 637 (ALT 250 FOR IP): Performed by: EMERGENCY MEDICINE

## 2021-09-27 PROCEDURE — 80048 BASIC METABOLIC PNL TOTAL CA: CPT

## 2021-09-27 PROCEDURE — 87040 BLOOD CULTURE FOR BACTERIA: CPT

## 2021-09-27 PROCEDURE — 85025 COMPLETE CBC W/AUTO DIFF WBC: CPT

## 2021-09-27 PROCEDURE — 89051 BODY FLUID CELL COUNT: CPT

## 2021-09-27 PROCEDURE — 83605 ASSAY OF LACTIC ACID: CPT

## 2021-09-27 PROCEDURE — 84157 ASSAY OF PROTEIN OTHER: CPT

## 2021-09-27 PROCEDURE — 82962 GLUCOSE BLOOD TEST: CPT

## 2021-09-27 PROCEDURE — 70498 CT ANGIOGRAPHY NECK: CPT

## 2021-09-27 PROCEDURE — 009U3ZX DRAINAGE OF SPINAL CANAL, PERCUTANEOUS APPROACH, DIAGNOSTIC: ICD-10-PCS | Performed by: RADIOLOGY

## 2021-09-27 PROCEDURE — 93005 ELECTROCARDIOGRAM TRACING: CPT | Performed by: NURSE PRACTITIONER

## 2021-09-27 PROCEDURE — 70496 CT ANGIOGRAPHY HEAD: CPT

## 2021-09-27 PROCEDURE — 2000000000 HC ICU R&B

## 2021-09-27 PROCEDURE — 87529 HSV DNA AMP PROBE: CPT

## 2021-09-27 PROCEDURE — 82077 ASSAY SPEC XCP UR&BREATH IA: CPT

## 2021-09-27 RX ORDER — MORPHINE SULFATE 2 MG/ML
2 INJECTION, SOLUTION INTRAMUSCULAR; INTRAVENOUS EVERY 4 HOURS PRN
Status: DISCONTINUED | OUTPATIENT
Start: 2021-09-27 | End: 2021-10-01

## 2021-09-27 RX ORDER — LORAZEPAM 2 MG/ML
2 INJECTION INTRAMUSCULAR ONCE
Status: DISCONTINUED | OUTPATIENT
Start: 2021-09-27 | End: 2021-09-29

## 2021-09-27 RX ORDER — 0.9 % SODIUM CHLORIDE 0.9 %
1000 INTRAVENOUS SOLUTION INTRAVENOUS ONCE
Status: DISCONTINUED | OUTPATIENT
Start: 2021-09-27 | End: 2021-09-27

## 2021-09-27 RX ORDER — KETOROLAC TROMETHAMINE 30 MG/ML
15 INJECTION, SOLUTION INTRAMUSCULAR; INTRAVENOUS ONCE
Status: DISCONTINUED | OUTPATIENT
Start: 2021-09-27 | End: 2021-09-27

## 2021-09-27 RX ORDER — LEVETIRACETAM 10 MG/ML
INJECTION INTRAVASCULAR
Status: DISPENSED
Start: 2021-09-27 | End: 2021-09-27

## 2021-09-27 RX ORDER — DIPHENOXYLATE HYDROCHLORIDE AND ATROPINE SULFATE 2.5; .025 MG/1; MG/1
1 TABLET ORAL 4 TIMES DAILY PRN
COMMUNITY

## 2021-09-27 RX ORDER — SODIUM CHLORIDE 9 MG/ML
INJECTION, SOLUTION INTRAVENOUS CONTINUOUS
Status: DISCONTINUED | OUTPATIENT
Start: 2021-09-27 | End: 2021-09-30

## 2021-09-27 RX ORDER — SODIUM CHLORIDE 0.9 % (FLUSH) 0.9 %
5-40 SYRINGE (ML) INJECTION EVERY 12 HOURS SCHEDULED
Status: DISCONTINUED | OUTPATIENT
Start: 2021-09-27 | End: 2021-10-06 | Stop reason: HOSPADM

## 2021-09-27 RX ORDER — ONDANSETRON 2 MG/ML
4 INJECTION INTRAMUSCULAR; INTRAVENOUS ONCE
Status: COMPLETED | OUTPATIENT
Start: 2021-09-27 | End: 2021-09-27

## 2021-09-27 RX ORDER — LEVETIRACETAM 500 MG/1
500 TABLET ORAL 2 TIMES DAILY
Status: DISCONTINUED | OUTPATIENT
Start: 2021-09-27 | End: 2021-09-29 | Stop reason: DRUGHIGH

## 2021-09-27 RX ORDER — SODIUM CHLORIDE 0.9 % (FLUSH) 0.9 %
5-40 SYRINGE (ML) INJECTION PRN
Status: DISCONTINUED | OUTPATIENT
Start: 2021-09-27 | End: 2021-10-06 | Stop reason: HOSPADM

## 2021-09-27 RX ORDER — 0.9 % SODIUM CHLORIDE 0.9 %
1000 INTRAVENOUS SOLUTION INTRAVENOUS ONCE
Status: COMPLETED | OUTPATIENT
Start: 2021-09-27 | End: 2021-09-27

## 2021-09-27 RX ORDER — LORAZEPAM 2 MG/ML
INJECTION INTRAMUSCULAR
Status: COMPLETED
Start: 2021-09-27 | End: 2021-09-27

## 2021-09-27 RX ORDER — POTASSIUM CHLORIDE 20 MEQ/1
40 TABLET, EXTENDED RELEASE ORAL ONCE
Status: COMPLETED | OUTPATIENT
Start: 2021-09-27 | End: 2021-09-27

## 2021-09-27 RX ORDER — LORAZEPAM 2 MG/ML
2 INJECTION INTRAMUSCULAR EVERY 4 HOURS PRN
Status: DISCONTINUED | OUTPATIENT
Start: 2021-09-27 | End: 2021-10-06 | Stop reason: HOSPADM

## 2021-09-27 RX ORDER — SODIUM CHLORIDE 9 MG/ML
25 INJECTION, SOLUTION INTRAVENOUS PRN
Status: DISCONTINUED | OUTPATIENT
Start: 2021-09-27 | End: 2021-10-02

## 2021-09-27 RX ORDER — LORAZEPAM 2 MG/ML
2 INJECTION INTRAMUSCULAR ONCE
Status: COMPLETED | OUTPATIENT
Start: 2021-09-27 | End: 2021-09-27

## 2021-09-27 RX ORDER — SODIUM CHLORIDE 0.9 % (FLUSH) 0.9 %
10 SYRINGE (ML) INJECTION ONCE
Status: COMPLETED | OUTPATIENT
Start: 2021-09-27 | End: 2021-09-27

## 2021-09-27 RX ORDER — CYCLOBENZAPRINE HCL 10 MG
10 TABLET ORAL 2 TIMES DAILY PRN
COMMUNITY

## 2021-09-27 RX ORDER — OXYCODONE HYDROCHLORIDE AND ACETAMINOPHEN 5; 325 MG/1; MG/1
1 TABLET ORAL EVERY 6 HOURS PRN
Status: ON HOLD | COMMUNITY
End: 2021-10-15 | Stop reason: HOSPADM

## 2021-09-27 RX ADMIN — LEVETIRACETAM 500 MG: 500 TABLET, FILM COATED ORAL at 22:35

## 2021-09-27 RX ADMIN — Medication 5 ML: at 23:06

## 2021-09-27 RX ADMIN — POTASSIUM CHLORIDE 40 MEQ: 1500 TABLET, EXTENDED RELEASE ORAL at 05:35

## 2021-09-27 RX ADMIN — MORPHINE SULFATE 2 MG: 2 INJECTION, SOLUTION INTRAMUSCULAR; INTRAVENOUS at 21:22

## 2021-09-27 RX ADMIN — LORAZEPAM 2 MG: 2 INJECTION INTRAMUSCULAR; INTRAVENOUS at 08:52

## 2021-09-27 RX ADMIN — LORAZEPAM 2 MG: 2 INJECTION INTRAMUSCULAR at 08:52

## 2021-09-27 RX ADMIN — SODIUM CHLORIDE: 9 INJECTION, SOLUTION INTRAVENOUS at 23:09

## 2021-09-27 RX ADMIN — NICARDIPINE HYDROCHLORIDE 12.5 MG/HR: 25 INJECTION, SOLUTION INTRAVENOUS at 06:41

## 2021-09-27 RX ADMIN — MORPHINE SULFATE 2 MG: 2 INJECTION, SOLUTION INTRAMUSCULAR; INTRAVENOUS at 16:59

## 2021-09-27 RX ADMIN — ONDANSETRON 4 MG: 2 INJECTION INTRAMUSCULAR; INTRAVENOUS at 04:00

## 2021-09-27 RX ADMIN — IOPAMIDOL 75 ML: 755 INJECTION, SOLUTION INTRAVENOUS at 06:22

## 2021-09-27 RX ADMIN — LEVETIRACETAM 2000 MG: 500 INJECTION, SOLUTION INTRAVENOUS at 12:49

## 2021-09-27 RX ADMIN — Medication 10 ML: at 06:22

## 2021-09-27 RX ADMIN — SODIUM CHLORIDE 1000 ML: 9 INJECTION, SOLUTION INTRAVENOUS at 04:01

## 2021-09-27 ASSESSMENT — PAIN DESCRIPTION - PAIN TYPE
TYPE: ACUTE PAIN
TYPE: ACUTE PAIN

## 2021-09-27 ASSESSMENT — PAIN SCALES - GENERAL
PAINLEVEL_OUTOF10: 10
PAINLEVEL_OUTOF10: 6
PAINLEVEL_OUTOF10: 7
PAINLEVEL_OUTOF10: 8

## 2021-09-27 ASSESSMENT — ENCOUNTER SYMPTOMS
SHORTNESS OF BREATH: 0
TROUBLE SWALLOWING: 0
ABDOMINAL PAIN: 0
PHOTOPHOBIA: 0

## 2021-09-27 ASSESSMENT — PAIN DESCRIPTION - DESCRIPTORS
DESCRIPTORS: ACHING
DESCRIPTORS: ACHING;DULL

## 2021-09-27 ASSESSMENT — PAIN DESCRIPTION - ONSET: ONSET: SUDDEN

## 2021-09-27 ASSESSMENT — PAIN DESCRIPTION - PROGRESSION: CLINICAL_PROGRESSION: NOT CHANGED

## 2021-09-27 ASSESSMENT — PAIN DESCRIPTION - LOCATION
LOCATION: HEAD;NECK
LOCATION: GENERALIZED

## 2021-09-27 ASSESSMENT — PAIN DESCRIPTION - FREQUENCY
FREQUENCY: CONTINUOUS
FREQUENCY: INTERMITTENT

## 2021-09-27 ASSESSMENT — PAIN DESCRIPTION - ORIENTATION: ORIENTATION: POSTERIOR

## 2021-09-27 NOTE — ED NOTES
Patient currently in IR for procedure.   Mom, Mc Alaniz is in room, introduced self to her, will go back once patient has returned     [facility]  09/27/21 6630

## 2021-09-27 NOTE — H&P
[address]  Consultants  History and Physical      CHIEF COMPLAINT:    Chief Complaint   Patient presents with    Generalized Body Aches     Pain from head to legs, states tingling in legs. Knee replacement right side in July. Took \" a whole bunch of Oxy 2 nights ago plus drank alcohol\"  Pt diaphoretic upon EMS arrival        Patient of Shayy Haley DO presents with:  SAH (subarachnoid hemorrhage) ([facility] [address]    History of Present Illness:   Patient states that yesterday she developed relatively acute onset of neck pain/neck stiffness associated with tingling in the hands and legs and generalized weakness. She denies fevers or chills. She denies any sort of head injury or falls. She reports some associated photophobia. She is slightly drowsy and postictal as she reportedly had just had a generalized tonic-clonic seizure prior to my arrival.  She has no other associated complaints at this time. REVIEW OF SYSTEMS:  Pertinent negatives are above in HPI. 10 point ROS otherwise negative. Past Medical History:   Diagnosis Date    Diverticulosis     GERD without esophagitis     Hyperlipidemia     Hypertension     Knee pain     Morbid obesity due to excess calories ([facility] [address]     Obesity     Sleep apnea     CPAP setting 11         Past Surgical History:   Procedure Laterality Date    COLONOSCOPY  2013    COLONOSCOPY N/A 1/25/2019    COLONOSCOPY DIAGNOSTIC performed by Giovana Cao MD at [address] COLONOSCOPY  11/04/2019    HYSTERECTOMY         Medications Prior to Admission:    Not in a hospital admission. Note that the patient's home medications were reviewed and the above list is accurate to the best of my knowledge at the time of the exam.    Allergies:    No known allergies    Social History:    reports that she has never smoked. She has never used smokeless tobacco. She reports current alcohol use of about 1.0 standard drinks of alcohol per week.  She reports that she does not use drugs. Family History:   No fhx SAH      PHYSICAL EXAM:    Vitals:  /79   Pulse 91   Temp 98.7 °F (37.1 °C) (Oral)   Resp 20   Ht 5' 8\" (1.727 m)   Wt (!) 394 lb (178.7 kg)   SpO2 98%   BMI 59.91 kg/m²       General appearance: NAD, conversant, eyes closed, mildly uncomfortable appearing  Eyes: Sclerae anicteric, PERRLA  HEENT: AT/NC, MMM  Neck: FROM, supple, no thyromegaly  Lymph: No cervical / supraclavicular lymphadenopathy  Lungs: Clear to auscultation, WOB normal  CV: RRR, no MRGs, no lower extremity edema  Abdomen: Soft, non-tender; no masses or HSM, +BS  Extremities: FROM without synovitis. No clubbing or cyanosis of the hands. Skin: no rash, induration, lesions, or ulcers  Psych: Calm and cooperative. Normal judgement and insight. Normal mood and affect. Neuro: Alert and interactive, face symmetric, speech fluent. PERRLA, EOMI, handgrips and foot plantar/dorsiflexors equal b/l though poor effort, sensation intact in b/l extremities    LABS:  All labs reviewed.   Of note:  CBC with Differential:    Lab Results   Component Value Date    WBC 8.2 09/27/2021    RBC 3.97 09/27/2021    HGB 10.8 09/27/2021    HCT 33.6 09/27/2021     09/27/2021    MCV 84.6 09/27/2021    MCH 27.2 09/27/2021    MCHC 32.1 09/27/2021    RDW 12.9 09/27/2021    LYMPHOPCT 10.2 09/27/2021    MONOPCT 3.9 09/27/2021    BASOPCT 0.5 09/27/2021    MONOSABS 0.32 09/27/2021    LYMPHSABS 0.83 09/27/2021    EOSABS 0.10 09/27/2021    BASOSABS 0.04 09/27/2021     CMP:    Lab Results   Component Value Date     09/27/2021    K 3.4 09/27/2021    K 3.8 06/22/2020     09/27/2021    CO2 22 09/27/2021    BUN 13 09/27/2021    CREATININE 0.7 09/27/2021    GFRAA >60 09/27/2021    LABGLOM >60 09/27/2021    GLUCOSE 144 09/27/2021    PROT 6.6 09/27/2021    LABALBU 4.2 09/27/2021    CALCIUM 8.7 09/27/2021    BILITOT 0.8 09/27/2021    ALKPHOS 111 09/27/2021    AST 33 09/27/2021    ALT 35 09/27/2021 Imaging:  I've personally reviewed the patient's CXR and CT head  Findings are concerning for a small amount of extra-axial hemorrhage,   subdural versus subarachnoid, anterior to the brainstem and the inferior   aspect of the kayla. Artifact thought less likely but not excluded. Short-term follow-up or correlation with MRI would be useful. Findings were discussed with Dr. Abram Renner at approximately [phone] hours [place]   time on 09/27/2021. Bilateral mixed interstitial and patchy airspace opacities are nonspecific   and could be related to minimal edema. Developing pneumonia not excluded. Continued follow-up recommended. ASSESSMENT/PLAN:  Principal Problem:    SAH (subarachnoid hemorrhage) (HCC)  Active Problems:    Bipolar disorder ([address]    HTN (hypertension), benign    Mixed hyperlipidemia    Morbid obesity with BMI of 60.0-69.9, adult (HCC)    Lactic acid acidosis    Hypokalemia    Seizure ([facility] [address]  Resolved Problems:    * No resolved hospital problems.  *      Nicardipine gtt goal SBP<130 per neurosurgery    MRI/MRA if body habitus will allow     Case d/w Neurosurgery    Neuro consult    LP    Keppra    IV ativan PRN    Replete K / check mag    Recheck lactate after fluids given in ED    Code status: full  Requires inpatient level of care  Tom Caceres MD    6:54 AM  9/27/2021

## 2021-09-27 NOTE — ED NOTES
This RN transported pt via cart to xray for Lumbar puncture on portable monitor, pulse ox and NIBP. Pt tolerated procedure fairly well, however was having R buttock and R leg pain throughout procedure. Nicardipene infusing well. Transported patient back to room 21 and updated mother who was at bedside. P.O. swabs given due to dry mouth and encouraged NPO at this time. Pt asking if she was going home today, informed her that she is being admitted to ICU. Pt and mother verbalized understanding.       Janusz Doss RN  09/27/21 0231

## 2021-09-27 NOTE — ED NOTES
Patient back in room, nicardine gtt decreased to 12.5 bp 102/74 hr [address], RN  09/27/21 8330

## 2021-09-27 NOTE — CONSULTS
NEUROSURGERY CONSULTATION     Chief Complaint: ICH     HPI:   Chester Silva is a 48 y.o.  female who presents to the ED this morning for evaluation of generalized body aches. Patient states last night she woke up in the middle of the night and felt like her head might explode. She was nausea but had no emesis events. On exam patient is complaining of the worst HA of her life. She has had HA before but never like this. Patient states her HA is in the front. She also has pain in her neck. (+)photophobia. She also is complaining of tingling in her upper and lower extremities. Patient did have a 30 second seizure while in the ED. CT Head and CTA head/neck reviewed. CT Head showed small amount of extra-axial hemorrhage, subdural versus subarachnoid which is why Neurosurgery was consulted. Past Medical History:   Diagnosis Date    Diverticulosis     GERD without esophagitis     Hyperlipidemia     Hypertension     Knee pain     Morbid obesity due to excess calories ([address]     Obesity     Sleep apnea     CPAP setting 11     Past Surgical History:   Procedure Laterality Date    COLONOSCOPY  2013    COLONOSCOPY N/A 1/25/2019    COLONOSCOPY DIAGNOSTIC performed by Nahum Raman MD at [address] [facility] COLONOSCOPY  11/04/2019    HYSTERECTOMY        History reviewed. No pertinent family history. Social History     Socioeconomic History    Marital status: Single     Spouse name: Not on file    Number of children: Not on file    Years of education: Not on file    Highest education level: Not on file   Occupational History    Not on file   Tobacco Use    Smoking status: Never Smoker    Smokeless tobacco: Never Used   Vaping Use    Vaping Use: Never used   Substance and Sexual Activity    Alcohol use:  Yes     Alcohol/week: 1.0 standard drinks     Types: 1 Glasses of wine per week     Comment: socially    Drug use: No    Sexual activity: Not on file   Other Topics Concern    Not on file   Social History Narrative    Not on file     Social Determinants of Health     Financial Resource Strain:     Difficulty of Paying Living Expenses:    Food Insecurity:     Worried About Running Out of Food in the Last Year:     [address] in the Last Year:    Transportation Needs:     Lack of Transportation (Medical):      Lack of Transportation (Non-Medical):    Physical Activity:     Days of Exercise per Week:     Minutes of Exercise per Session:    Stress:     Feeling of Stress :    Social Connections:     Frequency of Communication with Friends and Family:     Frequency of Social Gatherings with Friends and Family:     Attends Yarsanism Services:     Active Member of Clubs or Organizations:     Attends Club or Organization Meetings:     Marital Status:    Intimate Partner Violence:     Fear of Current or Ex-Partner:     Emotionally Abused:     Physically Abused:     Sexually Abused:        Medications:   Current Facility-Administered Medications   Medication Dose Route Frequency Provider Last Rate Last Admin    niCARdipine (CARDENE) 50 mg in sodium chloride 0.9 % 250 mL infusion  3-15 mg/hr IntraVENous Continuous Carlos Rose DO 62.5 mL/hr at 09/27/21 0641 12.5 mg/hr at 09/27/21 0641    levETIRAcetam in NaCl (KEPPRA) 1000 MG/100ML solution             levETIRAcetam (KEPPRA) 2,000 mg in sodium chloride 0.9 % 100 mL IVPB  2,000 mg IntraVENous Once Mohan Alvarado MD        LORazepam (ATIVAN) injection 2 mg  2 mg IntraVENous Once Mohan Alvarado MD        sodium chloride flush 0.9 % injection 5-40 mL  5-40 mL IntraVENous 2 times per day Mohan Alvarado MD        sodium chloride flush 0.9 % injection 5-40 mL  5-40 mL IntraVENous PRN Mohan Alvarado MD        0.9 % sodium chloride infusion  25 mL IntraVENous PRN Mohan Alvarado MD         Current Outpatient Medications   Medication Sig Dispense Refill    oxyCODONE-acetaminophen (PERCOCET) 5-325 MG per tablet Take 1 tablet by mouth every 6 hours as needed for Pain.  cyclobenzaprine (FLEXERIL) 10 MG tablet Take 10 mg by mouth 2 times daily as needed for Muscle spasms      diphenoxylate-atropine (LOMOTIL) 2.5-0.025 MG per tablet Take 1 tablet by mouth 4 times daily as needed for Diarrhea.  DULoxetine (CYMBALTA) 60 MG extended release capsule Take 60 mg by mouth daily           Allergies:    No known allergies       Review of Systems   Constitutional: Negative for fever. HENT: Negative for trouble swallowing. Eyes: Negative for photophobia. Respiratory: Negative for shortness of breath. Cardiovascular: Negative for chest pain. Gastrointestinal: Negative for abdominal pain. Endocrine: Negative for heat intolerance. Genitourinary: Negative for flank pain. Musculoskeletal: Positive for neck pain and neck stiffness. Negative for myalgias. Skin: Negative for wound. Neurological: Positive for dizziness, seizures and headaches. Negative for weakness and numbness. Psychiatric/Behavioral: Negative for confusion. Physical Exam  Constitutional:       Appearance: She is obese. HENT:      Head: Normocephalic and atraumatic. Eyes:      Pupils: Pupils are equal, round, and reactive to light. Cardiovascular:      Rate and Rhythm: Normal rate. Pulmonary:      Effort: Pulmonary effort is normal.   Abdominal:      General: There is no distension. Musculoskeletal:      Cervical back: Rigidity present. Skin:     General: Skin is warm and dry. Neurological:      Mental Status: She is alert. Comments: A&Ox3  CN3-12 intact  Motor Strength full   Sensation intact to light touch   Reflexes normal   (-) pronator drift    Psychiatric:         Thought Content:  Thought content normal.          /78   Pulse 105   Temp 98.7 °F (37.1 °C) (Oral)   Resp 18   Ht 5' 8\" (1.727 m)   Wt (!) 394 lb (178.7 kg)   SpO2 98%   BMI 59.91 kg/m²        Assessment:   - Tiffany Zacarias is a 49 y/o female who's CT Head showed small amount of extra-axial hemorrhage, subdural versus subarachnoid. Stable. Plan:  - Continue pain control  - LP to evaluate for SAH and bacterial etiology  - Keppra for a total of 7 days  - No AP/AC or NSAID medications        Electronically signed by VIOLA Trinidad on 9/27/2021 at 10:48 AM     Nsx Attending:    Patient was seen and examined by me with the team.  I personally reviewed all pertinent radiological images. I concur with Miss Lee's clinical assessment and plan. In brief, 48year old woman presents with the worst headache of her life. No prodrome. Neurologically non-focal except for severe nuchal rigidity. CT shows bridget-medullary SAH and with extension. CTA/V benign. Needs LP strict BP control, angiogram and plan as above. Thank you so much for allowing us to participate in the care of this patient.

## 2021-09-27 NOTE — PROGRESS NOTES
Patient's jewelry removed and placed in an envelope. .Envelope consisted of a thin silver chain with collins encrusted cross. Envelope handed to [facility] ,   patients nurse.

## 2021-09-27 NOTE — ED NOTES
Patient c/o severe headache before having a 30 second seizure.   Patient had period of approx 15 minutes postictal.       Glenna Conde RN  09/27/21 0044

## 2021-09-27 NOTE — LETTER
[place] Department Medicaid  CERTIFICATION OF NECESSITY  FOR NON-EMERGENCY TRANSPORTATION   BY GROUND AMBULANCE      Individual Information   1. Name: Sonja Raymundo 2. [place] Medicaid Billing Number:    3. Address:               [address]     Transportation Provider Information   4. Provider Name:    5. [place] Provider Number: National Provider Identifier (NPI):      Certification  7. Criteria:  During transport, this individual requires:  [x] Medical treatment or continuous     supervision by an EMT. [x] The administration or regulation of oxygen by another person. [] Supervised protective restraint. 8. Period Beginning Date: 9/29/2021'   9. Length  [] Not more than 10 day(s)  [] One Year     Additional Information Relevant to Certification   10. Comments or Explanations, If Necessary or Appropriate   SEIZURES       Certifying Practitioner Information   11. Name of Gigi Sparks. Evens Bernstein MD   12. [place] Medicaid Provider Number, If Applicable: [address] Provider Identifier (NPI):      Signature Information   14. Date of Signature: 9/29/2021   15. Name of Person Signing:Electronically signed by Aster Richardson RN on 9/29/2021 at 9:27 AM       16. Signature and Professional Designation: Electronically signed by Aster Richardson RN on 9/29/2021 at 9:27 AM       OD 86497  Rev. 7/2015      [address] Encounter Date/Time: 9/27/2021 [address]: [de-identified[facility]    MRN: [id number]    Patient: Sonja Raymundo    Contact Serial #: [id number]      ENCOUNTER          Patient Class: I Private Enc?   No Unit RM BD: [facility] 3803/3803-A   Hospital Service: ICU/CCU   Encounter DX: SAH (subarachnoid hemorr*   ADM Provider: Fatemeh Hooper MD   Procedure:     ATT Provider: Fatemeh Hooper MD   REF Provider:        Ad[facility] DX: [facility] and DX codes: I60.9      PATIENT Name: Williams Bence : 1975 (46 yrs)   Address: [address] [place] [address] Dr[address] Sex: Female   City: [address] [place] [address]         Marital Status: Single   Employer: [employer]         Congregation: None   Primary Care Provider: Domenic Guerrero DO         Primary Phone: [phone]   EMERGENCY CONTACT   Contact Name Legal Guardian? Relationship to Patient Home Phone Work Phone   1. Karina Oliva  2. Melissaetta Expose Parent  Brother/Sister [phone]                 GUARANTOR            Guarantor: Williams Bence     : 1975   Address: [address] [place] [address] ;[address] Sex: Female     [place] [address]     Relation to Patient: Self       Home Phone: [phone]   Guarantor ID: [id number]       Work Phone:     Guarantor Employer: [employer]         Status: FULL TIME      COVERAGE        PRIMARY INSURANCE   Payor: Katharina Hanna Plan: MARIELLE NAP CHOICE POS II   Payor Address: [facility] [id number]  [place], [place] [address]       Group Number: [id number] Insurance Type: [address] Name: Carmina Mak : 1975   Subscriber ID: [id number] Pat. Rel. to Sub: Self   SECONDARY INSURANCE   Payor:   Plan:     Payor Address:  ,           Group Number:   Insurance Type:     Subscriber Name:   Subscriber :     Subscriber ID:   Pat.  Rel. to Sub:

## 2021-09-27 NOTE — LETTER
[place] Department Medicaid  CERTIFICATION OF NECESSITY  FOR NON-EMERGENCY TRANSPORTATION   BY GROUND AMBULANCE      Individual Information   1. Name: Celso Kanner 2. [place] Billing Number:    3. Address:   [address] [facility] [address] [address]     Transportation Provider Information   4. Provider Name:    5. [place] Provider Number:  National Provider Identifier (NPI):      Certification  7. Criteria:  During transport, this individual requires:  [x] Medical treatment or continuous     supervision by an EMT. [x] The administration or regulation of oxygen by another person. [] Supervised protective restraint. 8.Period Beginning Date : 9/29/2021     9. Length  [] Not more than 10 day(s)  [] One Year     Additional Information Relevant to Certification   10. Comments or Explanations, If Necessary or Appropriate   SAH, uncontrolled hypertension, seizures     Certifying Practitioner Information   11. Name of Practitioner: Chantal Mina. Pearson Mcardle MD   12. [place] Medicaid Provider Number, If Applicable:  [address] Provider Identifier (NPI):      Signature Information   14. Date of Signature: 9/29/2021   15. Name of Person Signing: Electronically signed by Ila Harvey RN on 9/29/2021 at 9:40 AM     16. Signature and Professional Designation: Electronically signed by Ila Harvey RN on 9/29/2021 at 9:40 AM       OD 64232  Rev. 7/2015            [address] [place] [address] Encounter Date/Time: 9/27/2021 [id number] [facility] Account: [de-identified]    MRN: [id number]    Patient: Celso Kanner    Contact Serial #: [id number]      ENCOUNTER          Patient Class: I Private Enc?   No Unit RM BD: [facility] 3803/3803-A   Hospital Service: ICU/CCU   Encounter DX: SAH (subarachnoid hemorr*   ADM Provider: Jose Wilson MD   Procedure:     ATT Provider: Jose Wilson MD   REF Provider:        Admission DX: SAH (subarachnoid hemorrhage) ([facility] and DX codes: I60.9    PATIENT                 Name: Miriam Andrade : 1975 (46 yrs)   Address: [address] [place] [address] Dr[address] Sex: Female   City: [address] [place] [address]         Marital Status: Single   Employer: AAA         Sabianism: None   Primary Care Provider: Nakia Walsh DO         Primary Phone: [phone]   EMERGENCY CONTACT   Contact Name Legal Guardian? Relationship to Patient Home Phone Work Phone   1. Karina Oliva  2. Toribio Payer Parent  Brother/Sister [phone]                 GUARANTOR            Guarantor: Miriam Andrade     : 1975   Address: [address] [place] [address] ;[address] Sex: Female     [place] [address]     Relation to Patient: Self       Home Phone: [phone]   Guarantor ID: [id number]       Work Phone:     Guarantor Employer: [employer]         Status: FULL TIME      COVERAGE        PRIMARY INSURANCE   Payor: Raul Robert Plan: [employer] NAP CHOICE POS II   Payor Address: [facility] [id number]  [place], [place] [address]       Group Number: [id number] Insurance Type: [address] Name: Dipak Petersonl : 1975   Subscriber ID: [id number] Pat.  Rel. to Sub: Self   SECONDARY INSURANCE   Payor:   Plan:     Payor Address:  ,           Group Number:   Insurance Type:     Subscriber Name:   Subscriber :     Subscriber ID:

## 2021-09-27 NOTE — ED PROVIDER NOTES
her head and into her neck and down her back. She states she had numbness and tingling everywhere and felt as if she cannot move when this occurred. Therefore she came to the ED to be evaluated. Upon arrival the patient was diaphoretic. Patient states she did recently have a knee replacement in July on the right and has chronic right knee pain. She states she is having pain all over but worse in her head. She states she did take a lot of Oxycet as well as alcohol 2 days prior. Denies any chest pain shortness of breath. No abdominal pain. No nausea vomiting. No focal deficits. ROS   Pertinent positives and negatives are stated within HPI, all other systems reviewed and are negative. Past Medical History:  has a past medical history of Diverticulosis, GERD without esophagitis, Hyperlipidemia, Hypertension, Knee pain, Morbid obesity due to excess calories ([address] Obesity, and Sleep apnea. Surgical History:  has a past surgical history that includes Hysterectomy; Colonoscopy (2013); Colonoscopy (N/A, 1/25/2019); and Colonoscopy (11/04/2019). Social History:  reports that she has never smoked. She has never used smokeless tobacco. She reports current alcohol use of about 1.0 standard drinks of alcohol per week. She reports that she does not use drugs. Family History: family history is not on file. Allergies: No known allergies    PHYSICAL EXAM   Oxygen Saturation Interpretation: Normal on room air analysis. ED Triage Vitals   BP Temp Temp src Pulse Resp SpO2 Height Weight   -- -- -- -- -- -- -- --         Physical Exam  Constitutional/General: Alert and oriented x3, diaphoretic and appears uncomfortable. HEENT:  NC/NT. PERRLA,  Airway patent. Neck: Supple, full ROM, non tender to palpation in the midline, no stridor, no crepitus, no meningeal signs  Respiratory: Lungs clear to auscultation bilaterally, no wheezes, rales, or rhonchi. Not in respiratory distress  CV:  Regular rate. Regular rhythm. No murmurs, gallops, or rubs. 2+ distal pulses  Chest: No chest wall tenderness  GI:  Abdomen Soft, Non tender, Non distended. +BS. No rebound, guarding, or rigidity. No pulsatile masses. Musculoskeletal: Moves all extremities x 4. Warm and well perfused, no clubbing, cyanosis, or edema. Capillary refill <3 seconds  Integument: skin warm and moist with perspiration. No rashes. Lymphatic: no lymphadenopathy noted  Neurologic: GCS 15, no focal deficits, cranial nerves II to XII intact, symmetric strength 5/5 in the upper extremities bilaterally, 5 out of 5 muscle strength with plantarflexion and dorsiflexion of bilateral lower extremities. The patient does have difficulty lifting bilateral lower extremities secondary to pain that is in her back and shoots up into her neck.     Psychiatric: Normal Affect      Lab / Imaging Results   (All laboratory and radiology results have been personally reviewed by myself)  Labs:  Results for orders placed or performed during the hospital encounter of 09/27/21   COVID-19 & Influenza Combo    Specimen: Nasopharyngeal Swab   Result Value Ref Range    SARS-CoV-2 RNA, RT PCR NOT DETECTED NOT DETECTED    INFLUENZA A NOT DETECTED NOT DETECTED    INFLUENZA B NOT DETECTED NOT DETECTED   Comprehensive Metabolic Panel   Result Value Ref Range    Sodium 138 132 - 146 mmol/L    Potassium 3.4 (L) 3.5 - 5.0 mmol/L    Chloride 100 98 - 107 mmol/L    CO2 22 22 - 29 mmol/L    Anion Gap 16 7 - 16 mmol/L    Glucose 143 (H) 74 - 99 mg/dL    BUN 13 6 - 20 mg/dL    CREATININE 0.7 0.5 - 1.0 mg/dL    GFR Non-African American >60 >=60 mL/min/1.73    GFR African American >60     Calcium 8.7 8.6 - 10.2 mg/dL    Total Protein 6.6 6.4 - 8.3 g/dL    Albumin 4.2 3.5 - 5.2 g/dL    Total Bilirubin 0.8 0.0 - 1.2 mg/dL    Alkaline Phosphatase 111 (H) 35 - 104 U/L    ALT 35 (H) 0 - 32 U/L    AST 33 (H) 0 - 31 U/L   Lipase   Result Value Ref Range    Lipase 16 13 - 60 U/L   Troponin   Result Value Ref Range    Troponin, High Sensitivity 13 (H) 0 - 9 ng/L   Lactic Acid, Plasma   Result Value Ref Range    Lactic Acid 4.5 (HH) 0.5 - 2.2 mmol/L   Serum Drug Screen   Result Value Ref Range    Ethanol Lvl <10 mg/dL    Acetaminophen Level <5.0 (L) 10.0 - 39.1 mcg/mL    Salicylate, Serum <8.8 0.0 - 30.0 mg/dL    TCA Scrn NEGATIVE Cutoff:300 ng/mL   SPECIMEN REJECTION   Result Value Ref Range    Rejected Test CBCWD     Reason for Rejection see below    CBC auto differential   Result Value Ref Range    WBC 8.2 4.5 - 11.5 E9/L    RBC 3.97 3.50 - 5.50 E12/L    Hemoglobin 10.8 (L) 11.5 - 15.5 g/dL    Hematocrit 33.6 (L) 34.0 - 48.0 %    MCV 84.6 80.0 - 99.9 fL    MCH 27.2 26.0 - 35.0 pg    MCHC 32.1 32.0 - 34.5 %    RDW 12.9 11.5 - 15.0 fL    Platelets 066 375 - 813 E9/L    MPV 10.5 7.0 - 12.0 fL    Neutrophils % 83.6 (H) 43.0 - 80.0 %    Immature Granulocytes % 0.6 0.0 - 5.0 %    Lymphocytes % 10.2 (L) 20.0 - 42.0 %    Monocytes % 3.9 2.0 - 12.0 %    Eosinophils % 1.2 0.0 - 6.0 %    Basophils % 0.5 0.0 - 2.0 %    Neutrophils Absolute 6.82 1.80 - 7.30 E9/L    Immature Granulocytes # 0.05 E9/L    Lymphocytes Absolute 0.83 (L) 1.50 - 4.00 E9/L    Monocytes Absolute 0.32 0.10 - 0.95 E9/L    Eosinophils Absolute 0.10 0.05 - 0.50 E9/L    Basophils Absolute 0.04 0.00 - 0.20 E9/L   POCT Glucose   Result Value Ref Range    Glucose 144 mg/dL    QC OK? ok    POCT Glucose   Result Value Ref Range    Meter Glucose 144 (H) 74 - 99 mg/dL     Imaging: All Radiology results interpreted by Radiologist unless otherwise noted. CT Head WO Contrast   Final Result   Findings are concerning for a small amount of extra-axial hemorrhage,   subdural versus subarachnoid, anterior to the brainstem and the inferior   aspect of the kayla. Artifact thought less likely but not excluded. Short-term follow-up or correlation with MRI would be useful.       Findings were discussed with Dr. Rocio Geller at approximately [phone] hours [place]   time on 09/27/2021. XR CHEST PORTABLE   Final Result   Bilateral mixed interstitial and patchy airspace opacities are nonspecific   and could be related to minimal edema. Developing pneumonia not excluded. Continued follow-up recommended. CTA HEAD W CONTRAST    (Results Pending)   CTA NECK W CONTRAST    (Results Pending)     EKG: This EKG is signed and interpreted by me. EKG interpreted by emergency physician  EKG shows normal sinus rhythm at 95 bpm.  Prolonged QT. Normal QRS. No STEMI. ED Course / Medical Decision Making     Medications   niCARdipine (CARDENE) 50 mg in sodium chloride 0.9 % 250 mL infusion (has no administration in time range)   0.9 % sodium chloride bolus (1,000 mLs IntraVENous New Bag 9/27/21 0401)   ondansetron (ZOFRAN) injection 4 mg (4 mg IntraVENous Given 9/27/21 0400)   potassium chloride (KLOR-CON M) extended release tablet 40 mEq (40 mEq Oral Given 9/27/21 0535)        Re-Evaluations:  9/27/21      Time: 0617    Patients condition is improving. Consultations:             IP CONSULT TO NEUROSURGERY  IP CONSULT TO INTERNAL MEDICINE    Procedures:   none    MDM: This is a 77-year-old female presented to the ED for headache. Patient underwent laboratory work-up which showed a hemoglobin of 10.8. Chemistry showed potassium 3.4 which was replaced. Troponin was 13. Lactic acid elevated at 4.5. Serum drug screen negative. Covid test negative. Patient's head CT showed concerning symptoms and signs of extra-axial hemorrhages of subdural versus subarachnoid anterior to the brainstem. Therefore neurosurgery was consulted. They recommended systolic blood pressure control of less than 130 as well as CTAs and they will evaluate the patient. Patient's case discussed with Sebastian Jordan who was consulted and will admit the patient to the neuro ICU. She is started on Cardene for blood pressure control.       Critical care: 45 minutes    Plan of Care/Counseling:  EM Attending Physician reviewed today's visit with the patient in addition to providing specific details for the plan of care and counseling regarding the diagnosis and prognosis. Questions are answered at this time and are agreeable with the plan. ASSESSMENT     1. SAH (subarachnoid hemorrhage) ([facility]      This patient's ED course included: a personal history and physicial examination, re-evaluation prior to disposition, multiple bedside re-evaluations, IV medications, cardiac monitoring, continuous pulse oximetry and complex medical decision making and emergency management  This patient has remained hemodynamically stable during their ED course. PLAN   Admit to Neuro ICU. Patient condition is stable. New Medications     New Prescriptions    No medications on file     Electronically signed by Yodit Avendano DO   DD: 9/27/21  **This report was transcribed using voice recognition software. Every effort was made to ensure accuracy; however, inadvertent computerized transcription errors may be present.   END OF PROVIDER NOTE       Yodit Avendano DO  09/27/21 0588

## 2021-09-27 NOTE — ED NOTES
Bed: 21  Expected date:   Expected time:   Means of arrival:   Comments:      John Wallis RN  09/27/21 0891

## 2021-09-27 NOTE — PROGRESS NOTES
Unable to perform mri until the questionnaire is completed.  Pt is over the table limit so pt will need to be scheduled in the mobile mri which is only here today til 3 pm

## 2021-09-27 NOTE — ED NOTES
Patient pulled up in bed vitals as stated.   She is c/o severe pain in head and dr. Kelly Garcia paged for order s     Glenna Conde RN  09/27/21 3211

## 2021-09-28 ENCOUNTER — APPOINTMENT (OUTPATIENT)
Dept: INTERVENTIONAL RADIOLOGY/VASCULAR | Age: 46
DRG: 065 | End: 2021-09-28
Payer: COMMERCIAL

## 2021-09-28 LAB
ALBUMIN SERPL-MCNC: 4.6 G/DL (ref 3.5–5.2)
ALP BLD-CCNC: 113 U/L (ref 35–104)
ALT SERPL-CCNC: 38 U/L (ref 0–32)
ANGLE (CLOT STRENGTH): 69.7 DEGREE (ref 59–74)
ANION GAP SERPL CALCULATED.3IONS-SCNC: 11 MMOL/L (ref 7–16)
ANION GAP SERPL CALCULATED.3IONS-SCNC: 20 MMOL/L (ref 7–16)
ANION GAP SERPL CALCULATED.3IONS-SCNC: 8 MMOL/L (ref 7–16)
ANION GAP SERPL CALCULATED.3IONS-SCNC: 9 MMOL/L (ref 7–16)
AST SERPL-CCNC: 37 U/L (ref 0–31)
BASOPHILS ABSOLUTE: 0.07 E9/L (ref 0–0.2)
BASOPHILS RELATIVE PERCENT: 0.6 % (ref 0–2)
BILIRUB SERPL-MCNC: 0.8 MG/DL (ref 0–1.2)
BUN BLDV-MCNC: 12 MG/DL (ref 6–20)
BUN BLDV-MCNC: 6 MG/DL (ref 6–20)
BUN BLDV-MCNC: 8 MG/DL (ref 6–20)
BUN BLDV-MCNC: 9 MG/DL (ref 6–20)
CALCIUM SERPL-MCNC: 8.9 MG/DL (ref 8.6–10.2)
CALCIUM SERPL-MCNC: 8.9 MG/DL (ref 8.6–10.2)
CALCIUM SERPL-MCNC: 9 MG/DL (ref 8.6–10.2)
CALCIUM SERPL-MCNC: 9 MG/DL (ref 8.6–10.2)
CHLORIDE BLD-SCNC: 100 MMOL/L (ref 98–107)
CHLORIDE BLD-SCNC: 101 MMOL/L (ref 98–107)
CHLORIDE BLD-SCNC: 102 MMOL/L (ref 98–107)
CHLORIDE BLD-SCNC: 102 MMOL/L (ref 98–107)
CO2: 18 MMOL/L (ref 22–29)
CO2: 24 MMOL/L (ref 22–29)
CO2: 26 MMOL/L (ref 22–29)
CO2: 27 MMOL/L (ref 22–29)
CREAT SERPL-MCNC: 0.6 MG/DL (ref 0.5–1)
CREAT SERPL-MCNC: 0.7 MG/DL (ref 0.5–1)
EOSINOPHILS ABSOLUTE: 0.35 E9/L (ref 0.05–0.5)
EOSINOPHILS RELATIVE PERCENT: 3.2 % (ref 0–6)
EPL-TEG: 0 % (ref 0–15)
G-TEG: 7.7 K D/SC (ref 4.5–11)
GFR AFRICAN AMERICAN: >60
GFR NON-AFRICAN AMERICAN: >60 ML/MIN/1.73
GLUCOSE BLD-MCNC: 119 MG/DL (ref 74–99)
GLUCOSE BLD-MCNC: 119 MG/DL (ref 74–99)
GLUCOSE BLD-MCNC: 128 MG/DL (ref 74–99)
GLUCOSE BLD-MCNC: 156 MG/DL (ref 74–99)
HCT VFR BLD CALC: 34.5 % (ref 34–48)
HEMOGLOBIN: 11.2 G/DL (ref 11.5–15.5)
IMMATURE GRANULOCYTES #: 0.06 E9/L
IMMATURE GRANULOCYTES %: 0.6 % (ref 0–5)
K (CLOTTING TIME): 1.5 MIN (ref 1–3)
LACTIC ACID: 1.3 MMOL/L (ref 0.5–2.2)
LACTIC ACID: 4.8 MMOL/L (ref 0.5–2.2)
LY30 (FIBRINOLYSIS): 0 % (ref 0–8)
LYMPHOCYTES ABSOLUTE: 1.68 E9/L (ref 1.5–4)
LYMPHOCYTES RELATIVE PERCENT: 15.5 % (ref 20–42)
MA (MAX AMPLITUDE): 60.6 MM (ref 50–70)
MAGNESIUM: 1.8 MG/DL (ref 1.6–2.6)
MCH RBC QN AUTO: 27.1 PG (ref 26–35)
MCHC RBC AUTO-ENTMCNC: 32.5 % (ref 32–34.5)
MCV RBC AUTO: 83.3 FL (ref 80–99.9)
MONOCYTES ABSOLUTE: 0.97 E9/L (ref 0.1–0.95)
MONOCYTES RELATIVE PERCENT: 8.9 % (ref 2–12)
NEUTROPHILS ABSOLUTE: 7.73 E9/L (ref 1.8–7.3)
NEUTROPHILS RELATIVE PERCENT: 71.2 % (ref 43–80)
PDW BLD-RTO: 13.3 FL (ref 11.5–15)
PLATELET # BLD: 283 E9/L (ref 130–450)
PMV BLD AUTO: 10.4 FL (ref 7–12)
POTASSIUM SERPL-SCNC: 3.4 MMOL/L (ref 3.5–5)
POTASSIUM SERPL-SCNC: 3.4 MMOL/L (ref 3.5–5)
POTASSIUM SERPL-SCNC: 3.5 MMOL/L (ref 3.5–5)
POTASSIUM SERPL-SCNC: 3.6 MMOL/L (ref 3.5–5)
R (REACTION TIME): 2.9 MIN (ref 5–10)
RBC # BLD: 4.14 E12/L (ref 3.5–5.5)
SODIUM BLD-SCNC: 136 MMOL/L (ref 132–146)
SODIUM BLD-SCNC: 137 MMOL/L (ref 132–146)
SODIUM BLD-SCNC: 137 MMOL/L (ref 132–146)
SODIUM BLD-SCNC: 138 MMOL/L (ref 132–146)
TOTAL PROTEIN: 7 G/DL (ref 6.4–8.3)
WBC # BLD: 10.9 E9/L (ref 4.5–11.5)

## 2021-09-28 PROCEDURE — 2500000003 HC RX 250 WO HCPCS: Performed by: EMERGENCY MEDICINE

## 2021-09-28 PROCEDURE — 85025 COMPLETE CBC W/AUTO DIFF WBC: CPT

## 2021-09-28 PROCEDURE — 36226 PLACE CATH VERTEBRAL ART: CPT

## 2021-09-28 PROCEDURE — 36226 PLACE CATH VERTEBRAL ART: CPT | Performed by: RADIOLOGY

## 2021-09-28 PROCEDURE — 2700000000 HC OXYGEN THERAPY PER DAY

## 2021-09-28 PROCEDURE — 83735 ASSAY OF MAGNESIUM: CPT

## 2021-09-28 PROCEDURE — 2580000003 HC RX 258: Performed by: EMERGENCY MEDICINE

## 2021-09-28 PROCEDURE — 2500000003 HC RX 250 WO HCPCS: Performed by: RADIOLOGY

## 2021-09-28 PROCEDURE — 99232 SBSQ HOSP IP/OBS MODERATE 35: CPT | Performed by: PSYCHIATRY & NEUROLOGY

## 2021-09-28 PROCEDURE — 2000000000 HC ICU R&B

## 2021-09-28 PROCEDURE — 2580000003 HC RX 258: Performed by: NURSE PRACTITIONER

## 2021-09-28 PROCEDURE — 36223 PLACE CATH CAROTID/INOM ART: CPT | Performed by: RADIOLOGY

## 2021-09-28 PROCEDURE — 85384 FIBRINOGEN ACTIVITY: CPT

## 2021-09-28 PROCEDURE — 2500000003 HC RX 250 WO HCPCS: Performed by: INTERNAL MEDICINE

## 2021-09-28 PROCEDURE — 2580000003 HC RX 258: Performed by: INTERNAL MEDICINE

## 2021-09-28 PROCEDURE — 87081 CULTURE SCREEN ONLY: CPT

## 2021-09-28 PROCEDURE — C1769 GUIDE WIRE: HCPCS

## 2021-09-28 PROCEDURE — 99252 IP/OBS CONSLTJ NEW/EST SF 35: CPT | Performed by: RADIOLOGY

## 2021-09-28 PROCEDURE — 6360000002 HC RX W HCPCS: Performed by: RADIOLOGY

## 2021-09-28 PROCEDURE — 7100000000 HC PACU RECOVERY - FIRST 15 MIN

## 2021-09-28 PROCEDURE — 83605 ASSAY OF LACTIC ACID: CPT

## 2021-09-28 PROCEDURE — 6370000000 HC RX 637 (ALT 250 FOR IP): Performed by: INTERNAL MEDICINE

## 2021-09-28 PROCEDURE — 80053 COMPREHEN METABOLIC PANEL: CPT

## 2021-09-28 PROCEDURE — 6360000002 HC RX W HCPCS: Performed by: INTERNAL MEDICINE

## 2021-09-28 PROCEDURE — 7100000001 HC PACU RECOVERY - ADDTL 15 MIN

## 2021-09-28 PROCEDURE — 6370000000 HC RX 637 (ALT 250 FOR IP)

## 2021-09-28 PROCEDURE — 36223 PLACE CATH CAROTID/INOM ART: CPT

## 2021-09-28 PROCEDURE — 85576 BLOOD PLATELET AGGREGATION: CPT

## 2021-09-28 PROCEDURE — 6360000002 HC RX W HCPCS: Performed by: NURSE PRACTITIONER

## 2021-09-28 PROCEDURE — 6360000004 HC RX CONTRAST MEDICATION: Performed by: RADIOLOGY

## 2021-09-28 PROCEDURE — 80048 BASIC METABOLIC PNL TOTAL CA: CPT

## 2021-09-28 PROCEDURE — 76377 3D RENDER W/INTRP POSTPROCES: CPT

## 2021-09-28 PROCEDURE — 85347 COAGULATION TIME ACTIVATED: CPT

## 2021-09-28 PROCEDURE — 36415 COLL VENOUS BLD VENIPUNCTURE: CPT

## 2021-09-28 RX ORDER — SODIUM CHLORIDE, SODIUM LACTATE, POTASSIUM CHLORIDE, AND CALCIUM CHLORIDE .6; .31; .03; .02 G/100ML; G/100ML; G/100ML; G/100ML
500 INJECTION, SOLUTION INTRAVENOUS ONCE
Status: COMPLETED | OUTPATIENT
Start: 2021-09-28 | End: 2021-09-28

## 2021-09-28 RX ORDER — MIDAZOLAM HYDROCHLORIDE 1 MG/ML
INJECTION INTRAMUSCULAR; INTRAVENOUS
Status: COMPLETED | OUTPATIENT
Start: 2021-09-28 | End: 2021-09-28

## 2021-09-28 RX ORDER — FENTANYL CITRATE 50 UG/ML
INJECTION, SOLUTION INTRAMUSCULAR; INTRAVENOUS
Status: COMPLETED | OUTPATIENT
Start: 2021-09-28 | End: 2021-09-28

## 2021-09-28 RX ORDER — LABETALOL HYDROCHLORIDE 5 MG/ML
10 INJECTION, SOLUTION INTRAVENOUS ONCE
Status: COMPLETED | OUTPATIENT
Start: 2021-09-28 | End: 2021-09-28

## 2021-09-28 RX ORDER — LORAZEPAM 2 MG/ML
1 INJECTION INTRAMUSCULAR ONCE
Status: COMPLETED | OUTPATIENT
Start: 2021-09-28 | End: 2021-09-28

## 2021-09-28 RX ORDER — LORAZEPAM 1 MG/1
0.5 TABLET ORAL EVERY 4 HOURS PRN
Status: DISCONTINUED | OUTPATIENT
Start: 2021-09-28 | End: 2021-10-06 | Stop reason: HOSPADM

## 2021-09-28 RX ORDER — DIMETHICONE, OXYBENZONE, AND PADIMATE O 2; 2.5; 6.6 G/100G; G/100G; G/100G
STICK TOPICAL
Status: COMPLETED
Start: 2021-09-28 | End: 2021-09-28

## 2021-09-28 RX ORDER — HYDRALAZINE HYDROCHLORIDE 20 MG/ML
10 INJECTION INTRAMUSCULAR; INTRAVENOUS EVERY 4 HOURS PRN
Status: DISCONTINUED | OUTPATIENT
Start: 2021-09-28 | End: 2021-09-29

## 2021-09-28 RX ORDER — LABETALOL HYDROCHLORIDE 5 MG/ML
10 INJECTION, SOLUTION INTRAVENOUS EVERY 4 HOURS PRN
Status: DISCONTINUED | OUTPATIENT
Start: 2021-09-28 | End: 2021-09-29

## 2021-09-28 RX ORDER — LIDOCAINE HYDROCHLORIDE 20 MG/ML
INJECTION, SOLUTION INFILTRATION; PERINEURAL
Status: COMPLETED | OUTPATIENT
Start: 2021-09-28 | End: 2021-09-28

## 2021-09-28 RX ORDER — POTASSIUM CHLORIDE 7.45 MG/ML
10 INJECTION INTRAVENOUS
Status: COMPLETED | OUTPATIENT
Start: 2021-09-28 | End: 2021-09-28

## 2021-09-28 RX ADMIN — NICARDIPINE HYDROCHLORIDE 13 MG/HR: 25 INJECTION, SOLUTION INTRAVENOUS at 23:29

## 2021-09-28 RX ADMIN — SODIUM CHLORIDE: 9 INJECTION, SOLUTION INTRAVENOUS at 16:04

## 2021-09-28 RX ADMIN — POTASSIUM CHLORIDE 10 MEQ: 7.46 INJECTION, SOLUTION INTRAVENOUS at 19:16

## 2021-09-28 RX ADMIN — IOPAMIDOL 175 ML: 612 INJECTION, SOLUTION INTRAVENOUS at 12:02

## 2021-09-28 RX ADMIN — FENTANYL CITRATE 50 MCG: 50 INJECTION, SOLUTION INTRAMUSCULAR; INTRAVENOUS at 10:51

## 2021-09-28 RX ADMIN — FENTANYL CITRATE 50 MCG: 50 INJECTION, SOLUTION INTRAMUSCULAR; INTRAVENOUS at 11:24

## 2021-09-28 RX ADMIN — LIDOCAINE HYDROCHLORIDE 10 ML: 20 INJECTION, SOLUTION INFILTRATION; PERINEURAL at 11:10

## 2021-09-28 RX ADMIN — MIDAZOLAM 1 MG: 1 INJECTION INTRAMUSCULAR; INTRAVENOUS at 10:51

## 2021-09-28 RX ADMIN — Medication 10 ML: at 21:30

## 2021-09-28 RX ADMIN — LABETALOL HYDROCHLORIDE 10 MG: 5 INJECTION INTRAVENOUS at 13:52

## 2021-09-28 RX ADMIN — Medication 10 ML: at 08:44

## 2021-09-28 RX ADMIN — POTASSIUM CHLORIDE 10 MEQ: 7.46 INJECTION, SOLUTION INTRAVENOUS at 16:01

## 2021-09-28 RX ADMIN — FENTANYL CITRATE 50 MCG: 50 INJECTION, SOLUTION INTRAMUSCULAR; INTRAVENOUS at 11:09

## 2021-09-28 RX ADMIN — Medication: at 22:07

## 2021-09-28 RX ADMIN — LORAZEPAM 2 MG: 2 INJECTION INTRAMUSCULAR; INTRAVENOUS at 02:19

## 2021-09-28 RX ADMIN — SODIUM CHLORIDE, POTASSIUM CHLORIDE, SODIUM LACTATE AND CALCIUM CHLORIDE 500 ML: 600; 310; 30; 20 INJECTION, SOLUTION INTRAVENOUS at 07:53

## 2021-09-28 RX ADMIN — NICARDIPINE HYDROCHLORIDE 15 MG/HR: 25 INJECTION, SOLUTION INTRAVENOUS at 09:41

## 2021-09-28 RX ADMIN — MIDAZOLAM 1 MG: 1 INJECTION INTRAMUSCULAR; INTRAVENOUS at 11:09

## 2021-09-28 RX ADMIN — POTASSIUM CHLORIDE 10 MEQ: 7.46 INJECTION, SOLUTION INTRAVENOUS at 17:24

## 2021-09-28 RX ADMIN — MORPHINE SULFATE 2 MG: 2 INJECTION, SOLUTION INTRAMUSCULAR; INTRAVENOUS at 02:18

## 2021-09-28 RX ADMIN — LEVETIRACETAM 500 MG: 500 TABLET, FILM COATED ORAL at 08:41

## 2021-09-28 RX ADMIN — MORPHINE SULFATE 2 MG: 2 INJECTION, SOLUTION INTRAMUSCULAR; INTRAVENOUS at 22:15

## 2021-09-28 RX ADMIN — LABETALOL HYDROCHLORIDE 10 MG: 5 INJECTION INTRAVENOUS at 09:31

## 2021-09-28 RX ADMIN — LEVETIRACETAM 500 MG: 500 TABLET, FILM COATED ORAL at 22:10

## 2021-09-28 RX ADMIN — NICARDIPINE HYDROCHLORIDE 13 MG/HR: 25 INJECTION, SOLUTION INTRAVENOUS at 19:59

## 2021-09-28 RX ADMIN — MIDAZOLAM 1 MG: 1 INJECTION INTRAMUSCULAR; INTRAVENOUS at 11:24

## 2021-09-28 RX ADMIN — LORAZEPAM 1 MG: 2 INJECTION INTRAMUSCULAR; INTRAVENOUS at 09:31

## 2021-09-28 RX ADMIN — NICARDIPINE HYDROCHLORIDE 15 MG/HR: 25 INJECTION, SOLUTION INTRAVENOUS at 14:39

## 2021-09-28 ASSESSMENT — PAIN DESCRIPTION - LOCATION
LOCATION: NECK
LOCATION: HEAD
LOCATION: HEAD

## 2021-09-28 ASSESSMENT — PAIN SCALES - GENERAL
PAINLEVEL_OUTOF10: 0
PAINLEVEL_OUTOF10: 10
PAINLEVEL_OUTOF10: 0
PAINLEVEL_OUTOF10: 0
PAINLEVEL_OUTOF10: 7
PAINLEVEL_OUTOF10: 2
PAINLEVEL_OUTOF10: 0
PAINLEVEL_OUTOF10: 0
PAINLEVEL_OUTOF10: 6
PAINLEVEL_OUTOF10: 8

## 2021-09-28 ASSESSMENT — PAIN DESCRIPTION - FREQUENCY: FREQUENCY: INTERMITTENT

## 2021-09-28 ASSESSMENT — PAIN DESCRIPTION - PAIN TYPE
TYPE: ACUTE PAIN

## 2021-09-28 ASSESSMENT — PAIN DESCRIPTION - PROGRESSION: CLINICAL_PROGRESSION: GRADUALLY WORSENING

## 2021-09-28 ASSESSMENT — PAIN DESCRIPTION - DESCRIPTORS
DESCRIPTORS: ACHING;DISCOMFORT
DESCRIPTORS: HEADACHE;CONSTANT
DESCRIPTORS: CONSTANT;HEADACHE;POUNDING

## 2021-09-28 ASSESSMENT — PAIN DESCRIPTION - ONSET: ONSET: GRADUAL

## 2021-09-28 ASSESSMENT — PAIN - FUNCTIONAL ASSESSMENT: PAIN_FUNCTIONAL_ASSESSMENT: PREVENTS OR INTERFERES SOME ACTIVE ACTIVITIES AND ADLS

## 2021-09-28 ASSESSMENT — PAIN DESCRIPTION - ORIENTATION: ORIENTATION: POSTERIOR

## 2021-09-28 NOTE — PLAN OF CARE
Problem: Falls - Risk of:  Goal: Will remain free from falls  Description: Will remain free from falls  Outcome: Met This Shift  Goal: Absence of physical injury  Description: Absence of physical injury  Outcome: Met This Shift     Problem: Pain:  Goal: Pain level will decrease  Description: Pain level will decrease  Outcome: Met This Shift  Goal: Control of acute pain  Description: Control of acute pain  Outcome: Met This Shift  Goal: Control of chronic pain  Description: Control of chronic pain  Outcome: Met This Shift     Problem: HEMODYNAMIC STATUS  Goal: Patient has stable vital signs and fluid balance  Outcome: Ongoing     Problem: ACTIVITY INTOLERANCE/IMPAIRED MOBILITY  Goal: Mobility/activity is maintained at optimum level for patient  Outcome: Ongoing     Problem: COMMUNICATION IMPAIRMENT  Goal: Ability to express needs and understand communication  Outcome: Met This Shift

## 2021-09-28 NOTE — BRIEF OP NOTE
Brief Postoperative Note    Leatha Funes  YOB: 1975  [id number]    Pre-operative Diagnosis and Procedure: subarachnoid bleed plan angio    Post-operative Diagnosis: Same    Anesthesia: Local    Estimated Blood Loss: < 10 cc    Surgeon: Cherri RODRIGUEZ     Complications: none    Specimen obtained: none     Findings: none     Nate Minor II, MD   9/28/2021 10:16 AM

## 2021-09-28 NOTE — CONSULTS
Neuro-Interventional/ Interventional Consult     Patient Shannon Garcia  MRN: [id number]  YOB: 1975  DATE OF EVALUATION: 9/28/2021    HPI:   Chief Complaint   Patient presents with    Generalized Body Aches     Pain from head to legs, states tingling in legs. Knee replacement right side in July. Took \" a whole bunch of Oxy 2 nights ago plus drank alcohol\"  Pt diaphoretic upon EMS arrival       Assessment:   Reason For Evaluation:   Mallorie Gael a 48 y.o. female for possible subarachnoid bleed    Physical Exam: Alert but not orientated, moving all extremities    Plan:     rec  Cerebral angiogram        Discussed with Physician  10 min 30 min coordinating care and couseling      Allergies: Allergies   Allergen Reactions    No Known Allergies      Prior to Visit Medications    Medication Sig Taking? Authorizing Provider   oxyCODONE-acetaminophen (PERCOCET) 5-325 MG per tablet Take 1 tablet by mouth every 6 hours as needed for Pain. Yes Historical Provider, MD   cyclobenzaprine (FLEXERIL) 10 MG tablet Take 10 mg by mouth 2 times daily as needed for Muscle spasms Yes Historical Provider, MD   diphenoxylate-atropine (LOMOTIL) 2.5-0.025 MG per tablet Take 1 tablet by mouth 4 times daily as needed for Diarrhea. Yes Historical Provider, MD   DULoxetine (CYMBALTA) 60 MG extended release capsule Take 60 mg by mouth daily  Yes Historical Provider, MD     Social History     Tobacco Use    Smoking status: Never Smoker    Smokeless tobacco: Never Used   Vaping Use    Vaping Use: Never used   Substance Use Topics    Alcohol use: Yes     Alcohol/week: 1.0 standard drinks     Types: 1 Glasses of wine per week     Comment: socially    Drug use: No     History reviewed. No pertinent family history.   Past Surgical History:   Procedure Laterality Date    COLONOSCOPY  2013    COLONOSCOPY N/A 1/25/2019    COLONOSCOPY DIAGNOSTIC performed by Lyndle Nyhan, MD at [address] [place] [address] Not Detected Not Detected    Enterovirus CSF by PCR Not Detected Not Detected    Escherichia coli K1 CSF by PCR Not Detected Not Detected    Haemophilus influenzae CSF by PCR Not Detected Not Detected    Herpes simplex virus 1 CSF by PCR Not Detected Not Detected    Herpes simplex virus 2 CSF by PCR Not Detected Not Detected    Human herpesvirus 6 CSF by PCR Not Detected Not Detected    Human parechovirus CSF by PCR Not Detected Not Detected    Listeria monocytogenes CSF by PCR Not Detected Not Detected    Neisseria meningitidis CSF by PCR Not Detected Not Detected    Streptococcus agalactiae CSF by PCR Not Detected Not Detected    Streptococcus pneumoniae CSF by PCR Not Detected Not Detected    Varicella zoster virus CSF by PCR Not Detected Not Detected   Lactic acid, plasma    Collection Time: 09/27/21  9:39 PM   Result Value Ref Range    Lactic Acid 4.0 (HH) 0.5 - 2.2 mmol/L   Basic metabolic panel    Collection Time: 09/27/21 10:52 PM   Result Value Ref Range    Sodium 139 132 - 146 mmol/L    Potassium 3.8 3.5 - 5.0 mmol/L    Chloride 102 98 - 107 mmol/L    CO2 25 22 - 29 mmol/L    Anion Gap 12 7 - 16 mmol/L    Glucose 125 (H) 74 - 99 mg/dL    BUN 13 6 - 20 mg/dL    CREATININE 0.8 0.5 - 1.0 mg/dL    GFR Non-African American >60 >=60 mL/min/1.73    GFR African American >60     Calcium 8.7 8.6 - 10.2 mg/dL   Magnesium    Collection Time: 09/28/21  5:16 AM   Result Value Ref Range    Magnesium 1.8 1.6 - 2.6 mg/dL   CBC Auto Differential    Collection Time: 09/28/21  5:16 AM   Result Value Ref Range    WBC 10.9 4.5 - 11.5 E9/L    RBC 4.14 3.50 - 5.50 E12/L    Hemoglobin 11.2 (L) 11.5 - 15.5 g/dL    Hematocrit 34.5 34.0 - 48.0 %    MCV 83.3 80.0 - 99.9 fL    MCH 27.1 26.0 - 35.0 pg    MCHC 32.5 32.0 - 34.5 %    RDW 13.3 11.5 - 15.0 fL    Platelets 855 825 - 634 E9/L    MPV 10.4 7.0 - 12.0 fL    Neutrophils % 71.2 43.0 - 80.0 %    Immature Granulocytes % 0.6 0.0 - 5.0 %    Lymphocytes % 15.5 (L) 20.0 - 42.0 % Monocytes % 8.9 2.0 - 12.0 %    Eosinophils % 3.2 0.0 - 6.0 %    Basophils % 0.6 0.0 - 2.0 %    Neutrophils Absolute 7.73 (H) 1.80 - 7.30 E9/L    Immature Granulocytes # 0.06 E9/L    Lymphocytes Absolute 1.68 1.50 - 4.00 E9/L    Monocytes Absolute 0.97 (H) 0.10 - 0.95 E9/L    Eosinophils Absolute 0.35 0.05 - 0.50 E9/L    Basophils Absolute 0.07 0.00 - 0.20 E9/L   Comprehensive Metabolic Panel    Collection Time: 09/28/21  5:16 AM   Result Value Ref Range    Sodium 138 132 - 146 mmol/L    Potassium 3.5 3.5 - 5.0 mmol/L    Chloride 100 98 - 107 mmol/L    CO2 18 (L) 22 - 29 mmol/L    Anion Gap 20 (H) 7 - 16 mmol/L    Glucose 156 (H) 74 - 99 mg/dL    BUN 12 6 - 20 mg/dL    CREATININE 0.7 0.5 - 1.0 mg/dL    GFR Non-African American >60 >=60 mL/min/1.73    GFR African American >60     Calcium 8.9 8.6 - 10.2 mg/dL    Total Protein 7.0 6.4 - 8.3 g/dL    Albumin 4.6 3.5 - 5.2 g/dL    Total Bilirubin 0.8 0.0 - 1.2 mg/dL    Alkaline Phosphatase 113 (H) 35 - 104 U/L    ALT 38 (H) 0 - 32 U/L    AST 37 (H) 0 - 31 U/L   LACTIC ACID, PLASMA    Collection Time: 09/28/21  5:16 AM   Result Value Ref Range    Lactic Acid 4.8 (HH) 0.5 - 2.2 mmol/L       CT HEAD WO CONTRAST    Result Date: 9/27/2021  EXAMINATION: CT OF THE HEAD WITHOUT CONTRAST  9/27/2021 9:52 pm TECHNIQUE: CT of the head was performed without the administration of intravenous contrast. Dose modulation, iterative reconstruction, and/or weight based adjustment of the mA/kV was utilized to reduce the radiation dose to as low as reasonably achievable. COMPARISON: Earlier same day. HISTORY: ORDERING SYSTEM PROVIDED HISTORY: SAH follow up scan TECHNOLOGIST PROVIDED HISTORY: Has a \"code stroke\" or \"stroke alert\" been called? ->No Reason for exam:->SAH follow up scan What reading provider will be dictating this exam?->CRC FINDINGS: BRAIN/VENTRICLES: Small amount of acute subarachnoid hemorrhage along the anterior brainstem is similar to the prior study.   Questionable small amount of scattered acute subarachnoid hemorrhage in the sulci of both cerebral hemispheres, similar to the prior study. No definite new intracranial hemorrhage. There is no significant mass effect or midline shift. No abnormal extra-axial fluid collection. The gray-white differentiation is maintained without evidence of an acute infarct. There is no evidence of hydrocephalus. ORBITS: The visualized portion of the orbits demonstrate no acute abnormality. SINUSES: The visualized paranasal sinuses and mastoid air cells demonstrate no acute abnormality. SOFT TISSUES/SKULL:  No acute abnormality of the visualized skull or soft tissues. Small amount of acute subarachnoid hemorrhage along the anterior brainstem is similar to the prior study. Questionable small amount of scattered acute subarachnoid hemorrhage in the sulci of both cerebral hemispheres, also similar to the prior study. No definite new intracranial hemorrhage. No mass effect or midline shift. CT Head WO Contrast    Result Date: 9/27/2021  EXAMINATION: CT OF THE HEAD WITHOUT CONTRAST  9/27/2021 4:57 am TECHNIQUE: CT of the head was performed without the administration of intravenous contrast. Dose modulation, iterative reconstruction, and/or weight based adjustment of the mA/kV was utilized to reduce the radiation dose to as low as reasonably achievable. COMPARISON: 01/23/2015 HISTORY: ORDERING SYSTEM PROVIDED HISTORY: headache TECHNOLOGIST PROVIDED HISTORY: Reason for exam:->headache Has a \"code stroke\" or \"stroke alert\" been called? ->No Decision Support Exception - unselect if not a suspected or confirmed emergency medical condition->Emergency Medical Condition (MA) What reading provider will be dictating this exam?->CRC FINDINGS: Anterior to the brainstem and the inferior margin of the kayla is some curvilinear extra-axial increased density which was not present previously.  This is concerning for a small amount of extra-axial hemorrhage such as subdural or subarachnoid blood. Artifact thought to be a less likely consideration. No other definitive areas of acute intracranial hemorrhage, mass effect or midline shift. Basal cisterns are patent. Ventricles are not enlarged. Visualized portions of the paranasal sinuses and mastoid air cells are aerated and clear. No calvarial fracture. Findings are concerning for a small amount of extra-axial hemorrhage, subdural versus subarachnoid, anterior to the brainstem and the inferior aspect of the kayla. Artifact thought less likely but not excluded. Short-term follow-up or correlation with MRI would be useful. Findings were discussed with Dr. Jessica Heller at approximately [phone] hours [place] time on 09/27/2021. XR CHEST PORTABLE    Result Date: 9/27/2021  EXAMINATION: ONE XRAY VIEW OF THE CHEST 9/27/2021 2:22 am COMPARISON: 11/07/2019 HISTORY: ORDERING SYSTEM PROVIDED HISTORY: Possible sepsis TECHNOLOGIST PROVIDED HISTORY: Reason for exam:->Possible sepsis What reading provider will be dictating this exam?->CRC FINDINGS: The cardiac silhouette is near the upper limits of normal.  No pneumothorax. No effusion. Mild interstitial and patchy airspace opacities are identified bilaterally slightly greater towards the right. Degenerative changes are scattered in the spine. Bilateral mixed interstitial and patchy airspace opacities are nonspecific and could be related to minimal edema. Developing pneumonia not excluded. Continued follow-up recommended. CTA NECK W CONTRAST    Result Date: 9/27/2021  EXAMINATION: CTA OF THE HEAD WITH CONTRAST; CTA OF THE NECK 9/27/2021 6:21 am: TECHNIQUE: CTA of the head/brain was performed with the administration of intravenous contrast. Multiplanar reformatted images are provided for review. MIP images are provided for review.  Dose modulation, iterative reconstruction, and/or weight based adjustment of the mA/kV was utilized to reduce the radiation dose to as low as reasonably achievable.; CTA of the neck was performed with the administration of intravenous contrast. Multiplanar reformatted images are provided for review. MIP images are provided for review. Stenosis of the internal carotid arteries measured using NASCET criteria. Dose modulation, iterative reconstruction, and/or weight based adjustment of the mA/kV was utilized to reduce the radiation dose to as low as reasonably achievable. COMPARISON: None. HISTORY: ORDERING SYSTEM PROVIDED HISTORY: headache, ? sah TECHNOLOGIST PROVIDED HISTORY: Reason for exam:->headache, ? sah Has a \"code stroke\" or \"stroke alert\" been called? ->No Decision Support Exception - unselect if not a suspected or confirmed emergency medical condition->Emergency Medical Condition (MA) What reading provider will be dictating this exam?->RP Initial evaluation. FINDINGS: CTA NECK: AORTIC ARCH/ARCH VESSELS: No dissection or arterial injury. No significant stenosis of the brachiocephalic or subclavian arteries. CAROTID ARTERIES: No dissection, arterial injury, or hemodynamically significant stenosis by NASCET criteria. VERTEBRAL ARTERIES: No dissection, arterial injury, or significant stenosis. There is a right dominant vertebral artery. SOFT TISSUES: No focal consolidation within the visualized lung apices. No acute abnormality within the visualized superior mediastinum. BONES: No acute osseous abnormality is seen. Degenerative changes of the cervical and thoracic spine are noted. CTA HEAD: ANTERIOR CIRCULATION: No significant stenosis of the intracranial internal carotid, anterior cerebral, or middle cerebral arteries. No aneurysm. POSTERIOR CIRCULATION: No significant stenosis of the vertebral, basilar, or posterior cerebral arteries. No aneurysm. OTHER: No dural venous sinus thrombosis on this non-dedicated study. BRAIN: The extra-axial blood no along the ventral aspect of the brainstem is better visualized the prior noncontrast CT.   No evidence of mass effect or midline shift. 1. No flow limiting stenosis is seen of the cervical carotid/vertebral arteries. 2. No significant stenosis of the [id number]. 3. No intracranial aneurysm identified. CTA HEAD W CONTRAST    Result Date: 9/27/2021  EXAMINATION: CTA OF THE HEAD WITH CONTRAST; CTA OF THE NECK 9/27/2021 6:21 am: TECHNIQUE: CTA of the head/brain was performed with the administration of intravenous contrast. Multiplanar reformatted images are provided for review. MIP images are provided for review. Dose modulation, iterative reconstruction, and/or weight based adjustment of the mA/kV was utilized to reduce the radiation dose to as low as reasonably achievable.; CTA of the neck was performed with the administration of intravenous contrast. Multiplanar reformatted images are provided for review. MIP images are provided for review. Stenosis of the internal carotid arteries measured using NASCET criteria. Dose modulation, iterative reconstruction, and/or weight based adjustment of the mA/kV was utilized to reduce the radiation dose to as low as reasonably achievable. COMPARISON: None. HISTORY: ORDERING SYSTEM PROVIDED HISTORY: headache, ? sah TECHNOLOGIST PROVIDED HISTORY: Reason for exam:->headache, ? sah Has a \"code stroke\" or \"stroke alert\" been called? ->No Decision Support Exception - unselect if not a suspected or confirmed emergency medical condition->Emergency Medical Condition (MA) What reading provider will be dictating this exam?->RP Initial evaluation. FINDINGS: CTA NECK: AORTIC ARCH/ARCH VESSELS: No dissection or arterial injury. No significant stenosis of the brachiocephalic or subclavian arteries. CAROTID ARTERIES: No dissection, arterial injury, or hemodynamically significant stenosis by NASCET criteria. VERTEBRAL ARTERIES: No dissection, arterial injury, or significant stenosis. There is a right dominant vertebral artery.  SOFT TISSUES: No focal consolidation within the visualized lung apices. No acute abnormality within the visualized superior mediastinum. BONES: No acute osseous abnormality is seen. Degenerative changes of the cervical and thoracic spine are noted. CTA HEAD: ANTERIOR CIRCULATION: No significant stenosis of the intracranial internal carotid, anterior cerebral, or middle cerebral arteries. No aneurysm. POSTERIOR CIRCULATION: No significant stenosis of the vertebral, basilar, or posterior cerebral arteries. No aneurysm. OTHER: No dural venous sinus thrombosis on this non-dedicated study. BRAIN: The extra-axial blood no along the ventral aspect of the brainstem is better visualized the prior noncontrast CT. No evidence of mass effect or midline shift. 1. No flow limiting stenosis is seen of the cervical carotid/vertebral arteries. 2. No significant stenosis of the [id number]. 3. No intracranial aneurysm identified. FL LUMBAR PUNCTURE DIAG    Result Date: 9/27/2021  EXAMINATION: FLUOROSCOPIC GUIDED LUMBAR PUNCTURE 9/27/2021 3:14 pm HISTORY: ORDERING SYSTEM PROVIDED HISTORY: possible SAH TECHNOLOGIST PROVIDED HISTORY: Reason for exam:->possible SAH What reading provider will be dictating this exam?->CRC FLUOROSCOPY DOSE AND TYPE OR TIME AND EXPOSURES: Images: 3 Fluoroscopic time: 1.4 minutes Total dose: 124 mGy PROCEDURE: :  Christos Mayo Informed consent was obtained after the risks and benefits of the procedure were discussed with the patient and all questions were answered fully. Williams protocol was observed and a standard timeout was performed. The patient was positioned prone and the back was prepped and draped in the normal sterile fashion. 1% lidocaine was used for local anesthesia. The subarachnoid space was accessed with a 20-gauge 6 \"spinal needle at the L4/5 level. A single lumbar puncture was performed without difficulty.  Approximately 14 ml of CSF was removed and tubes 1 through 4 showed persistent hemorrhagic CSF without significant change. CSF fluid was sent for routine analysis. The stylet was reinserted, spinal needle was removed and brief pressure was applied at the puncture site. There were no immediate complications. 1.  Successful lumbar puncture with fluoroscopy. 2.  A single lumbar puncture was performed without apparent trauma but with persistent hemorrhagic CSF on all fluid samples.   This finding appears to correlate with suspected prepontine subarachnoid hemorrhage by recent cranial CT exam.       Patient Active Problem List   Diagnosis    Morbid obesity with BMI of 50.0-59.9, adult ([address]    Bipolar disorder ([address]    HTN (hypertension), benign    Mixed hyperlipidemia    Acute diverticulitis    Joint pain    Knee pain    Morbid obesity with BMI of 60.0-69.9, adult ([address]    Diverticulosis    SAH (subarachnoid hemorrhage) ([address]    Lactic acid acidosis    Hypokalemia    Seizure ([address]         Renuka Lacey II, MD  10:13 AM  9/28/2021

## 2021-09-28 NOTE — ED NOTES
Dr Symone Antony notified of increased lactic acid with  labs     Tarsha Pandya, [address] [facility]  09/28/21 [id number]

## 2021-09-28 NOTE — ED NOTES
Preliminary results for spinal fluid per lab: Moderate PMNs  Rare Epithelial Cells  Abundant Red Cells  No organisms seen.       Gene Buenrostro RN  09/28/21 [id number]

## 2021-09-28 NOTE — PROGRESS NOTES
sensation  · Seizures: yes - single witnessed seizure in ED lasting 30 seconds  · Difficulty with Memory: no    Psychiatric  · Anxiety: no  · Depression: no  · Other mental health issues: yes - Bipolar disorder      Medical History:   Past Medical History:   Diagnosis Date    Diverticulosis     GERD without esophagitis     Hyperlipidemia     Hypertension     Knee pain     Morbid obesity due to excess calories ([address]     Obesity     Sleep apnea     CPAP setting 11        Surgical History:   Past Surgical History:   Procedure Laterality Date    COLONOSCOPY  2013    COLONOSCOPY N/A 1/25/2019    COLONOSCOPY DIAGNOSTIC performed by Sumaya Mathews MD at [address] [facility] COLONOSCOPY  11/04/2019    HYSTERECTOMY          Family History:   History reviewed. No pertinent family history. Social History:  Social History     Tobacco Use    Smoking status: Never Smoker    Smokeless tobacco: Never Used   Vaping Use    Vaping Use: Never used   Substance Use Topics    Alcohol use:  Yes     Alcohol/week: 1.0 standard drinks     Types: 1 Glasses of wine per week     Comment: socially    Drug use: No        Current Medications:      Current Facility-Administered Medications   Medication Dose Route Frequency Provider Last Rate Last Admin    labetalol (NORMODYNE;TRANDATE) injection 10 mg  10 mg IntraVENous Q4H PRN Whitney Patino MD   10 mg at 09/28/21 1352    hydrALAZINE (APRESOLINE) injection 10 mg  10 mg IntraVENous Q4H PRN Whitney Patino MD        LORazepam (ATIVAN) tablet 0.5 mg  0.5 mg Oral Q4H PRN Whitney Patino MD        niCARdipine (CARDENE) 50 mg in sodium chloride 0.9 % 250 mL infusion  3-15 mg/hr IntraVENous Continuous Anitra Latalma, DO 75 mL/hr at 09/28/21 1439 15 mg/hr at 09/28/21 1439    LORazepam (ATIVAN) injection 2 mg  2 mg IntraVENous Once Glen Dobbs MD        sodium chloride flush 0.9 % injection 5-40 mL  5-40 mL IntraVENous 2 times per day Glen Dobbs MD   10 mL at 09/28/21 Right Left   Right Left   Deltoid 5 5  Hip Flexion 4 4   Biceps      5  5  Knee Extension 4 4   Triceps 5 5  Knee Flexion 4 4   Handgrip 5 5  Ankle Dorsiflexion 5 5       Ankle Plantarflexion 5 5     Tone: Normal in all four limbs. She has had knee replacement, reports chronic lower back pain. Significant back and neck rigidity- patient was very uncomfortable maneuvering to assess motor function. Bulk: Normal in all four limbs with no evidence of atrophy    Sensation  · Light Touch: Intact distally in all four limbs  · Pinprick: Intact distally in all four limbs  · Vibration: Intact distally in all four limbs  · Proprioception: Intact distally in all four limbs    Reflexes     Right Left   Biceps 2 2   Brachioradialis 2 2   Triceps 2 2   Patellar 2 2   Achilles 2 2   ankle clonus none none     Toes down going bilaterally. Labs  Labs  Results for Nya Pope (MRN [id number]) as of 9/27/2021 20:25    Ref. Range 9/27/2021 15:15 9/27/2021 15:15   Appearance, CSF Latest Ref Range: Clear  Bloody (A) Bloody (A)   Glucose, CSF Latest Ref Range: 40 - 70 mg/dL 19 (L)     Protein, CSF Latest Ref Range: 15 - 40 mg/dL 417 (H)     RBC, CSF Latest Units: /uL [id number]   WBC, CSF Latest Ref Range: 0 - 2 /uL 791 (H) 8,366 (H)   Neutrophils, CSF Latest Ref Range: 0 - 10 % 72 (H) 94 (H)   Monocytes, CSF Latest Ref Range: 10 - 70 % 28 6 (L)   Color, CSF Unknown Red Red   Tube Number + CELL CT + DIFF-CSF Unknown Tube 1 Tube 4   *Reportedly NOT a traumatic tap done with IR         Imaging  CTA NECK:       AORTIC ARCH/ARCH VESSELS: No dissection or arterial injury.  No significant   stenosis of the brachiocephalic or subclavian arteries.       CAROTID ARTERIES: No dissection, arterial injury, or hemodynamically   significant stenosis by NASCET criteria.       VERTEBRAL ARTERIES: No dissection, arterial injury, or significant stenosis.    There is a right dominant vertebral artery.       SOFT TISSUES: No focal consolidation within the visualized lung apices.  No   acute abnormality within the visualized superior mediastinum.       BONES: No acute osseous abnormality is seen.  Degenerative changes of the   cervical and thoracic spine are noted.           CTA HEAD:       ANTERIOR CIRCULATION: No significant stenosis of the intracranial internal   carotid, anterior cerebral, or middle cerebral arteries. No aneurysm.       POSTERIOR CIRCULATION: No significant stenosis of the vertebral, basilar, or   posterior cerebral arteries. No aneurysm.       OTHER: No dural venous sinus thrombosis on this non-dedicated study.       BRAIN: The extra-axial blood no along the ventral aspect of the brainstem is   better visualized the prior noncontrast CT.  No evidence of mass effect or   midline shift.             Electronically signed by: Mark Benavidez, 9/28/2021 2:46 PM     Attending Attestation  Patient somnolent when seen today following anesthesia for procedure. Otherwise exam relatively unchanged. I have seen and evaluated this patient. I discussed the chief complaint, history of present illness, and review of systems as well as the past medical/social/family history sections for this patient. I have examined this patient and participated in the care of this patient. I have reviewed the pertinent clinical information including physical exam, labs, and radiographic studies. I have discussed the patient's care and plan with the resident/NP/PA/medical student. Please see pertinent Consult or Medical Progress Note for further details.     Electronically signed by: Dylan Granda DO, 9/28/2021 5:55 PM

## 2021-09-28 NOTE — PROGRESS NOTES
Department of Neurosurgery  Progress Note    CHIEF COMPLAINT: SAH    SUBJECTIVE:  Patient fell early this morning when trying to get up to go to the restroom, she did not hit her head. Complains of some soreness and weakness in the legs. HA still present. No new complaints. REVIEW OF SYSTEMS :  Constitutional: Negative for chills and fever. Neurological: Negative for dizziness, tremors and speech change. OBJECTIVE:   VITALS:  BP (!) 156/89   Pulse 100   Temp 98.4 °F (36.9 °C) (Oral)   Resp 30   Ht 5' 8\" (1.727 m)   Wt (!) 394 lb (178.7 kg)   SpO2 96%   BMI 59.91 kg/m²     PHYSICAL:  Alert, oriented  Appears stated age  PERRL  EOMI  Strength full  Sensation intact to light touch  (-) pronator drift.      DATA:  CBC:   Lab Results   Component Value Date    WBC 10.9 09/28/2021    RBC 4.14 09/28/2021    HGB 11.2 09/28/2021    HCT 34.5 09/28/2021    MCV 83.3 09/28/2021    MCH 27.1 09/28/2021    MCHC 32.5 09/28/2021    RDW 13.3 09/28/2021     09/28/2021    MPV 10.4 09/28/2021     BMP:    Lab Results   Component Value Date     09/28/2021    K 3.5 09/28/2021    K 3.8 06/22/2020     09/28/2021    CO2 18 09/28/2021    BUN 12 09/28/2021    LABALBU 4.6 09/28/2021    CREATININE 0.7 09/28/2021    CALCIUM 8.9 09/28/2021    GFRAA >60 09/28/2021    LABGLOM >60 09/28/2021    GLUCOSE 156 09/28/2021     PT/INR:    Lab Results   Component Value Date    PROTIME 11.7 09/27/2021    INR 1.1 09/27/2021     PTT:    Lab Results   Component Value Date    APTT 21.2 09/27/2021   [APTT}    Current Inpatient Medications  Current Facility-Administered Medications: lactated ringers bolus, 500 mL, IntraVENous, Once  niCARdipine (CARDENE) 50 mg in sodium chloride 0.9 % 250 mL infusion, 3-15 mg/hr, IntraVENous, Continuous  LORazepam (ATIVAN) injection 2 mg, 2 mg, IntraVENous, Once  sodium chloride flush 0.9 % injection 5-40 mL, 5-40 mL, IntraVENous, 2 times per day  sodium chloride flush 0.9 % injection 5-40 mL, 5-40 mL, IntraVENous, PRN  0.9 % sodium chloride infusion, 25 mL, IntraVENous, PRN  levETIRAcetam (KEPPRA) tablet 500 mg, 500 mg, Oral, BID  LORazepam (ATIVAN) injection 2 mg, 2 mg, IntraVENous, Q4H PRN  morphine (PF) injection 2 mg, 2 mg, IntraVENous, Q4H PRN  0.9 % sodium chloride infusion, , IntraVENous, Continuous    ASSESSMENT:   - Verito Oliva 47 y/o female who LP (+) for SAH secondary to non-imaged intracranial aneurysm.       PLAN:  -Patient will need to be transferred to unit facility with neuro ICU capabilites ()  -Keep SBP <140   -Continue Keppra  -No AP/AC or NSAID medications      Electronically signed by VIOLA Bojorquez on 9/28/2021 at 9:23 AM

## 2021-09-28 NOTE — PLAN OF CARE
Problem: Pain:  Goal: Pain level will decrease  9/28/2021 0853 by Alphonso Manuel RN  Outcome: Met This Shift  Goal: Control of acute pain  9/28/2021 0853 by Alphonso Manuel RN  Outcome: Met This Shift  Goal: Control of chronic pain  9/28/2021 0853 by Alphonso Manuel RN  Outcome: Met This Shift     Problem: ACTIVITY INTOLERANCE/IMPAIRED MOBILITY  Goal: Mobility/activity is maintained at optimum level for patient  9/28/2021 0853 by Alphonso Manuel RN  Outcome: Ongoing     Problem: COMMUNICATION IMPAIRMENT  Goal: Ability to express needs and understand communication  9/28/2021 0853 by Alphonso Manuel RN  Outcome: Met This Shift     Problem: COMMUNICATION IMPAIRMENT  Goal: Ability to express needs and understand communication  9/28/2021 0853 by Alphonso Manuel RN  Outcome: Met This Shift

## 2021-09-28 NOTE — ED NOTES
Pt ambulating to restroom with assistance of brother at her request. Educated on reasons we should not be up out of bed ambulating. Pt understands reasons/risks and still insisted on ambulating to restroom. Pt was offered bedpan/purewick, and pitts catheter-which she had previous removed herself earlier in the day.         Quentin Oliveira RN  09/28/21 7219

## 2021-09-28 NOTE — PLAN OF CARE
Problem: Falls - Risk of:  Goal: Will remain free from falls  9/28/2021 0853 by Ino Aragon RN  Outcome: Met This Shift  Goal: Absence of physical injury  9/28/2021 0853 by Ino Aragon RN  Outcome: Met This Shift     Problem: Pain:  Goal: Pain level will decrease  9/28/2021 0853 by Ino Aragon RN  Outcome: Met This Shift  Goal: Control of acute pain  9/28/2021 0853 by Ino Aragon RN  Outcome: Met This Shift  Goal: Control of chronic pain  9/28/2021 0853 by Ino Aragon RN  Outcome: Met This Shift     Problem: HEMODYNAMIC STATUS  Goal: Patient has stable vital signs and fluid balance  9/28/2021 0853 by Ino Aragon RN  Outcome: Ongoing     Problem: ACTIVITY INTOLERANCE/IMPAIRED MOBILITY  Goal: Mobility/activity is maintained at optimum level for patient  9/28/2021 0853 by Ino Aragon RN  Outcome: Ongoing     Problem: COMMUNICATION IMPAIRMENT  Goal: Ability to express needs and understand communication  9/28/2021 0853 by Ino Aragon RN  Outcome: Met This Shift

## 2021-09-28 NOTE — PROGRESS NOTES
Patient extremely active and continually moving during procedure which made imaging difficult, quality images may not be available. Patient was medicatied with conscience sedation.

## 2021-09-28 NOTE — CONSULTS
Surgical Intensive Care Unit  Daily Progress Note    Date of admission:  9/27/2021    Reason for ICU transfer: Subarachnoid hemorrhage status post angio    Hospital course:  Patient presented 9/2 7 secondary to headache, generalized body aches and pain. She stated that she had numbness and tingling everywhere. On presentation patient was diaphoretic. She states that she took oxycodone and alcohol 2 days prior to help with this pain. On presentation she was found to have a subarachnoid hemorrhage anterior to the brainstem    9/27: Patient presented secondary to general body aches found to have a subarachnoid hemorrhage secondary to intracranial aneurysm. Started on nicardipine drip to help control blood pressure. Patient developed acute symptomatic seizures secondary to brain bleed  9/28: Patient fell yesterday secondary to her knees giving out while getting up to go to the bathroom. Patient was redirected in told to lie in bed secondary to her subarachnoid bleed. Neurosurgery was asked to evaluate patient. We will continue Keppra. Patient's GCS is 14 1 off her confusion today    Physical Exam:  /76   Pulse 98   Temp 97.7 °F (36.5 °C) (Infrared)   Resp 24   Ht 5' 8\" (1.727 m)   Wt (!) 394 lb 10 oz (179 kg)   SpO2 93%   BMI 60.00 kg/m²   CONSTITUTIONAL:  awake, cooperative, mildly confused on exam no apparent distress, and appears stated age  EYES:  pupils equal, round and reactive to light  NECK:  supple, symmetrical, trachea midline  LUNGS:  No increased work of breathing, good air exchange, clear to auscultation bilaterally, no crackles or wheezing  CARDIOVASCULAR:  regular rate and rhythm  ABDOMEN:  No scars, normal bowel sounds, soft, non-distended, non-tender, no masses palpated, no hepatosplenomegally  MUSCULOSKELETAL:  there is no redness, warmth, or swelling of the joints. Able to move all 4 extremities  NEUROLOGIC:  Awake, oriented to name only.   Cranial nerves II-XII are grossly intact. Motor is 5 out of 5 bilaterally. Sensory is intact. Babinski down going,   SKIN:  no bruising or bleeding    ASSESSMENT / PLAN:  · Neuro:  Subarachnoid bleed anterior to brainstem, tiny scattered subarachnoid's--all stable on repeat CT imaging. Suspected intracranial aneurysm. status post IR angio. GCS 14, 1 off for confusion. Acute symptomatic seizure-requiring 3 mg of Ativan  2 mg Ativan as needed for seizures, 0.5 mg Ativan for anxiety as needed  Keppra 500 twice daily  Neurosurgery following  Neurology following  MRI brain pending  Pain -morphine as needed  · CV: Hypertension-on Cardene, maintain systolic blood pressure below 140, monitor hemodynamics  TEG pending  · Pulm: No acute issues, on room air-  Monitor RR, SpO2 above 90  · GI:  N.p.o., normal saline at 75 cc an hour. Elevated lactic acid, continue to monitor  · Renal: No acute issues, external catheter in place-  Monitor UOP  · ID:  No current indication for antibiotics-blood cultures were obtained, CSF fluid was obtained, monitor for SIRS  · Endo: No acute issues-  Monitor glucose glucose levels between 140 and 180  · MSK: Patient is in a severe fall risk, strict bedrest    Total fluid rate: Normal saline 75 cc/h  Bowel regimen: GlycoLax  DVT proph:  SCDs  GI proph:  None   Watson:   External catheter in place, keep in place for critical care monitoring of fluid balance.   CVC sites:  No central line, peripheral site and left antecubital  Ancillary consults: Internal medicine, neurosurgery, neurology  Family Update: Is available         Morna Soulier, DO  PGY-2  Critical Care

## 2021-09-28 NOTE — CONSULTS
[facility]  Neurology Consult    Date:  9/27/2021  Patient Name:  Leatha Funes  YOB: 1975  MRN: [id number]     PCP:  Pete Quinn DO   Referring:  No ref. provider found      Chief Complaint: headache    History obtained from: patient, patient's family    Magy Robbins is a 48 y.o. female with atraumatic SAH suspected secondary to non-imaged intracranial aneurysm with acute symptomatic seizure. Plan  · Agree with neurosurgery and internal medicine, patient would be well served by transfer to unit with neuroscience ICU capabilities  · Continue to maintain SBP<140 with calcium channel blockers (nimodipine scheduled or nicardipine if drip needed)  · Continue Keppra 500 mg BID  · Seizure safety precautions  · Plan for repeat CT head w/o contrast today  · Will likely need catheter angiogram for further occult aneurysm evaluation        History of Present Illness:  Leatha Funes is a 48 y.o. female presenting for evaluation of acute onset headache described as severe, \"worst\" headache of her life with neck stiffness. CT head showed suspected subarachnoid hemorrhage with no aneurysm noted on CTA head. While in the ED patient also reportedly had a tonic-clonic seizure. Review of Systems:  As above    Medical History:   Past Medical History:   Diagnosis Date    Diverticulosis     GERD without esophagitis     Hyperlipidemia     Hypertension     Knee pain     Morbid obesity due to excess calories ([address]     Obesity     Sleep apnea     CPAP setting 11        Surgical History:   Past Surgical History:   Procedure Laterality Date    COLONOSCOPY  2013    COLONOSCOPY N/A 1/25/2019    COLONOSCOPY DIAGNOSTIC performed by Rodrigo Dsouza MD at [id number] [facility] COLONOSCOPY  11/04/2019    HYSTERECTOMY          Family History:   History reviewed. No pertinent family history.     Social History:  Social History     Tobacco Use    Smoking status: Never Smoker    Smokeless tobacco: Never Used   Vaping Use    Vaping Use: Never used   Substance Use Topics    Alcohol use: Yes     Alcohol/week: 1.0 standard drinks     Types: 1 Glasses of wine per week     Comment: socially    Drug use: No        Current Medications:      Current Facility-Administered Medications   Medication Dose Route Frequency Provider Last Rate Last Admin    niCARdipine (CARDENE) 50 mg in sodium chloride 0.9 % 250 mL infusion  3-15 mg/hr IntraVENous Continuous Carlos Rose DO 62.5 mL/hr at 09/27/21 1604 12.5 mg/hr at 09/27/21 1604    levETIRAcetam in NaCl (KEPPRA) 1000 MG/100ML solution             LORazepam (ATIVAN) injection 2 mg  2 mg IntraVENous Once Jayson Price MD        sodium chloride flush 0.9 % injection 5-40 mL  5-40 mL IntraVENous 2 times per day Jayson Price MD        sodium chloride flush 0.9 % injection 5-40 mL  5-40 mL IntraVENous PRN Jayson Price MD        0.9 % sodium chloride infusion  25 mL IntraVENous PRN Jayson Price MD        levETIRAcetam (KEPPRA) tablet 500 mg  500 mg Oral BID Luis Alfredo Petit MD        LORazepam (ATIVAN) injection 2 mg  2 mg IntraVENous Q4H PRN Luis Alfredo Petit MD        morphine (PF) injection 2 mg  2 mg IntraVENous Q4H PRN Luis Alfredo Petit MD   2 mg at 09/27/21 1659     Current Outpatient Medications   Medication Sig Dispense Refill    oxyCODONE-acetaminophen (PERCOCET) 5-325 MG per tablet Take 1 tablet by mouth every 6 hours as needed for Pain.  cyclobenzaprine (FLEXERIL) 10 MG tablet Take 10 mg by mouth 2 times daily as needed for Muscle spasms      diphenoxylate-atropine (LOMOTIL) 2.5-0.025 MG per tablet Take 1 tablet by mouth 4 times daily as needed for Diarrhea.  DULoxetine (CYMBALTA) 60 MG extended release capsule Take 60 mg by mouth daily           Allergies:       Allergies   Allergen Reactions    No Known Allergies         Physical Examination  Vitals   Vitals:    09/27/21 1530 09/27/21 1545 09/27/21 1606 09/27/21 1631   BP: 133/66 128/77 102/74 111/81   Pulse: 116 116 111 111   Resp: 20 18 20    Temp:   98.4 °F (36.9 °C)    TempSrc:       SpO2: 98% 96% 96%    Weight:       Height:            General: Patient appears in moderate distress  HEENT: +neck stiffness  Chest: no respiratory difficulties noted  Extremities: No edema or cyanosis noted    Neurologic Examination  Awake, alert, speech clear without aphasia. Visual fields full bilaterally. Moves all four limbs symmetrically with good strength. Labs  Results for Amaury Schaffer (MRN [id number]) as of 9/27/2021 20:25   Ref. Range 9/27/2021 15:15 9/27/2021 15:15   Appearance, CSF Latest Ref Range: Clear  Bloody (A) Bloody (A)   Glucose, CSF Latest Ref Range: 40 - 70 mg/dL 19 (L)    Protein, CSF Latest Ref Range: 15 - 40 mg/dL 417 (H)    RBC, CSF Latest Units: /uL [id number]   WBC, CSF Latest Ref Range: 0 - 2 /uL 791 (H) 8,366 (H)   Neutrophils, CSF Latest Ref Range: 0 - 10 % 72 (H) 94 (H)   Monocytes, CSF Latest Ref Range: 10 - 70 % 28 6 (L)   Color, CSF Unknown Red Red   Tube Number + CELL CT + DIFF-CSF Unknown Tube 1 Tube 4   *Reportedly NOT a traumatic tap done with IR    Imaging  CT head w/o contrast  Impression   Findings are concerning for a small amount of extra-axial hemorrhage,   subdural versus subarachnoid, anterior to the brainstem and the inferior   aspect of the kayla.  Artifact thought less likely but not excluded. Short-term follow-up or correlation with MRI would be useful.       Findings were discussed with Dr. Jackie Carvajal at approximately 0525 hours [place]   time on 09/27/2021. CTA head/neck     Impression   1. No flow limiting stenosis is seen of the cervical carotid/vertebral   arteries. 2. No significant stenosis of the [id number]. 3. No intracranial aneurysm identified.              Electronically signed by: Fawad Kay DO, 9/27/2021 8:25 PM

## 2021-09-28 NOTE — PROGRESS NOTES
Spoke to neurosurgeon at [facility], Dr. Joe Jean. He will not accept the patient without images transferred but its not clear if we have a mechanism to electronically transfer the images.       transfer center will contact [facility] and see if we have a way to electronically transfer the images

## 2021-09-28 NOTE — ED NOTES
Upon this RN entering room to check patients BP and titrate cardene, pt up out of bed sitting in chair-off heart monitor and BP cuff again. Patient reminded she should be in bed and not up walking around d/t her [facility] and her spinal tap yesterday. Patient insistent on getting up out of chair and walking to restroom, pt then gets up \"knee gives out\" this RN helps patient to floor. Patient did not hit her head/neck or back. Landed on her buttocks/R leg. Pt c/o being sore, Dr Lucero Letters notified at this time and assisted in getting patient back into bed. Pt educated by this RN and several other RNs as to why she needs to stay in bed and leave heart monitor and BP cuff on. Patient utilizes call light for water and food purposes-reminded to use call light for any other needs instead of getting up. Pure Wick placed. Pt remains alert & oriented x4.       Dhiraj Cadena RN  09/28/21 0959

## 2021-09-28 NOTE — INTERVAL H&P NOTE
H&P Update    Patient's History and Physical  was reviewed. The patient appears likely to able to tolerate the procedure. Risk and benefits discussed including ultimate complications, possibly death and consent obtained.     Alina Sen, II

## 2021-09-28 NOTE — ED NOTES
Pt yelling at this RN regarding bed status at [address] [place] [address], explained to patient that we are waiting on  to make a decision. Advised pt I will update her as soon as I know something. Pt upset, uncooperative, took herself off heart monitor and refusing to wear it at this time. Bp also removed BP cuff, explained importance of checking her BP as she is on a medication to control her BP. Pt angry and c/o pain-explained she is allowed to have her morphine every 4 hours.       Crissy Soliz RN  09/28/21 0004

## 2021-09-28 NOTE — PLAN OF CARE
Problem: Falls - Risk of:  Goal: Will remain free from falls  Description: Will remain free from falls  9/28/2021 1721 by Sravanthi Fritz RN  Outcome: Met This Shift  9/28/2021 0853 by Era Hendesron RN  Outcome: Met This Shift  Goal: Absence of physical injury  Description: Absence of physical injury  9/28/2021 1721 by Sravanthi Fritz RN  Outcome: Met This Shift  9/28/2021 0853 by Era Henderson RN  Outcome: Met This Shift     Problem: Pain:  Goal: Pain level will decrease  Description: Pain level will decrease  9/28/2021 1721 by Sravanthi Fritz RN  Outcome: Met This Shift  9/28/2021 0853 by Era Henderson RN  Outcome: Met This Shift  Goal: Control of acute pain  Description: Control of acute pain  9/28/2021 1721 by Sravanthi Fritz RN  Outcome: Met This Shift  9/28/2021 0853 by Era Henderson RN  Outcome: Met This Shift  Goal: Control of chronic pain  Description: Control of chronic pain  9/28/2021 1721 by Sravanthi Fritz RN  Outcome: Met This Shift  9/28/2021 0853 by Era Henderson RN  Outcome: Met This Shift     Problem: HEMODYNAMIC STATUS  Goal: Patient has stable vital signs and fluid balance  9/28/2021 1721 by Sravanthi Fritz RN  Outcome: Met This Shift  9/28/2021 0853 by Era Henderson RN  Outcome: Ongoing     Problem: ACTIVITY INTOLERANCE/IMPAIRED MOBILITY  Goal: Mobility/activity is maintained at optimum level for patient  9/28/2021 1721 by Sravanthi Fritz RN  Outcome: Met This Shift  9/28/2021 0853 by Era Henderson RN  Outcome: Ongoing     Problem: COMMUNICATION IMPAIRMENT  Goal: Ability to express needs and understand communication  9/28/2021 1721 by Sravanthi Fritz RN  Outcome: Met This Shift  9/28/2021 0853 by Era Henderson RN  Outcome: Met This Shift     Problem:  Bowel/Gastric:  Goal: Control of bowel function will improve  Description: Control of bowel function will improve  Outcome: Met This Shift  Goal: Ability to achieve a regular elimination pattern will improve  Description: Ability to achieve a regular elimination pattern will improve  Outcome: Met This Shift     Problem: Nutritional:  Goal: Ability to follow a diet with enough fiber (20 to 30 grams) for normal bowel function will improve  Description: Ability to follow a diet with enough fiber (20 to 30 grams) for normal bowel function will improve  Outcome: Met This Shift     Problem: Skin Integrity:  Goal: Risk for impaired skin integrity will decrease  Description: Risk for impaired skin integrity will decrease  Outcome: Met This Shift  Goal: Will show no infection signs and symptoms  Description: Will show no infection signs and symptoms  Outcome: Met This Shift  Goal: Absence of new skin breakdown  Description: Absence of new skin breakdown  Outcome: Met This Shift     Problem: Anxiety:  Goal: Level of anxiety will decrease  Description: Level of anxiety will decrease  Outcome: Met This Shift

## 2021-09-28 NOTE — ED NOTES
Patient transferred into hospital bed      \[facility] [place] [facility]\""  09/28/21 8919

## 2021-09-28 NOTE — ED NOTES
Spoke with Roopa in ct, images are being pushed through to [employer], [address] [place] [address]  09/27/21 4060

## 2021-09-28 NOTE — POST SEDATION
POST SEDATION NOTE:  Time: 10:16 AM    Cardiopulmonary: Vitals Signs Stable: yes    Level of Consciousness: alert    Reversal Agent Used: No    Complications: none    Follow-up/Observations: none    Pain Score: 1    Alexus Jackson MD

## 2021-09-29 ENCOUNTER — APPOINTMENT (OUTPATIENT)
Dept: ULTRASOUND IMAGING | Age: 46
DRG: 065 | End: 2021-09-29
Payer: COMMERCIAL

## 2021-09-29 LAB
ANION GAP SERPL CALCULATED.3IONS-SCNC: 10 MMOL/L (ref 7–16)
ANION GAP SERPL CALCULATED.3IONS-SCNC: 13 MMOL/L (ref 7–16)
ANION GAP SERPL CALCULATED.3IONS-SCNC: 18 MMOL/L (ref 7–16)
BUN BLDV-MCNC: 6 MG/DL (ref 6–20)
CALCIUM IONIZED: 1.19 MMOL/L (ref 1.15–1.33)
CALCIUM SERPL-MCNC: 8.2 MG/DL (ref 8.6–10.2)
CALCIUM SERPL-MCNC: 8.5 MG/DL (ref 8.6–10.2)
CALCIUM SERPL-MCNC: 8.5 MG/DL (ref 8.6–10.2)
CHLORIDE BLD-SCNC: 101 MMOL/L (ref 98–107)
CHLORIDE BLD-SCNC: 99 MMOL/L (ref 98–107)
CHLORIDE BLD-SCNC: 99 MMOL/L (ref 98–107)
CO2: 19 MMOL/L (ref 22–29)
CO2: 22 MMOL/L (ref 22–29)
CO2: 27 MMOL/L (ref 22–29)
CREAT SERPL-MCNC: 0.5 MG/DL (ref 0.5–1)
CREAT SERPL-MCNC: 0.6 MG/DL (ref 0.5–1)
CREAT SERPL-MCNC: 0.6 MG/DL (ref 0.5–1)
GFR AFRICAN AMERICAN: >60
GFR NON-AFRICAN AMERICAN: >60 ML/MIN/1.73
GLUCOSE BLD-MCNC: 113 MG/DL (ref 74–99)
GLUCOSE BLD-MCNC: 116 MG/DL (ref 74–99)
GLUCOSE BLD-MCNC: 118 MG/DL (ref 74–99)
HCT VFR BLD CALC: 32.4 % (ref 34–48)
HEMOGLOBIN: 10.5 G/DL (ref 11.5–15.5)
MAGNESIUM: 2 MG/DL (ref 1.6–2.6)
MCH RBC QN AUTO: 27.4 PG (ref 26–35)
MCHC RBC AUTO-ENTMCNC: 32.4 % (ref 32–34.5)
MCV RBC AUTO: 84.6 FL (ref 80–99.9)
MRSA CULTURE ONLY: NORMAL
PDW BLD-RTO: 13 FL (ref 11.5–15)
PHOSPHORUS: 3.2 MG/DL (ref 2.5–4.5)
PLATELET # BLD: 242 E9/L (ref 130–450)
PMV BLD AUTO: 10.1 FL (ref 7–12)
POTASSIUM SERPL-SCNC: 3.2 MMOL/L (ref 3.5–5)
POTASSIUM SERPL-SCNC: 3.3 MMOL/L (ref 3.5–5)
POTASSIUM SERPL-SCNC: 3.6 MMOL/L (ref 3.5–5)
RBC # BLD: 3.83 E12/L (ref 3.5–5.5)
SODIUM BLD-SCNC: 136 MMOL/L (ref 132–146)
WBC # BLD: 9.2 E9/L (ref 4.5–11.5)

## 2021-09-29 PROCEDURE — 36415 COLL VENOUS BLD VENIPUNCTURE: CPT

## 2021-09-29 PROCEDURE — 93886 INTRACRANIAL COMPLETE STUDY: CPT

## 2021-09-29 PROCEDURE — 2500000003 HC RX 250 WO HCPCS: Performed by: EMERGENCY MEDICINE

## 2021-09-29 PROCEDURE — 2580000003 HC RX 258: Performed by: EMERGENCY MEDICINE

## 2021-09-29 PROCEDURE — 6370000000 HC RX 637 (ALT 250 FOR IP): Performed by: NURSE PRACTITIONER

## 2021-09-29 PROCEDURE — 99233 SBSQ HOSP IP/OBS HIGH 50: CPT | Performed by: PHYSICIAN ASSISTANT

## 2021-09-29 PROCEDURE — 2700000000 HC OXYGEN THERAPY PER DAY

## 2021-09-29 PROCEDURE — 6370000000 HC RX 637 (ALT 250 FOR IP): Performed by: INTERNAL MEDICINE

## 2021-09-29 PROCEDURE — 2000000000 HC ICU R&B

## 2021-09-29 PROCEDURE — 82330 ASSAY OF CALCIUM: CPT

## 2021-09-29 PROCEDURE — 99232 SBSQ HOSP IP/OBS MODERATE 35: CPT | Performed by: NEUROLOGICAL SURGERY

## 2021-09-29 PROCEDURE — 80048 BASIC METABOLIC PNL TOTAL CA: CPT

## 2021-09-29 PROCEDURE — 6370000000 HC RX 637 (ALT 250 FOR IP): Performed by: SURGERY

## 2021-09-29 PROCEDURE — 93886 INTRACRANIAL COMPLETE STUDY: CPT | Performed by: RADIOLOGY

## 2021-09-29 PROCEDURE — 84100 ASSAY OF PHOSPHORUS: CPT

## 2021-09-29 PROCEDURE — 6370000000 HC RX 637 (ALT 250 FOR IP): Performed by: NEUROLOGICAL SURGERY

## 2021-09-29 PROCEDURE — 85027 COMPLETE CBC AUTOMATED: CPT

## 2021-09-29 PROCEDURE — APPSS30 APP SPLIT SHARED TIME 16-30 MINUTES: Performed by: NURSE PRACTITIONER

## 2021-09-29 PROCEDURE — 99291 CRITICAL CARE FIRST HOUR: CPT | Performed by: SURGERY

## 2021-09-29 PROCEDURE — 83735 ASSAY OF MAGNESIUM: CPT

## 2021-09-29 PROCEDURE — 6360000002 HC RX W HCPCS: Performed by: INTERNAL MEDICINE

## 2021-09-29 RX ORDER — LABETALOL HYDROCHLORIDE 5 MG/ML
10 INJECTION, SOLUTION INTRAVENOUS EVERY 10 MIN PRN
Status: DISCONTINUED | OUTPATIENT
Start: 2021-09-29 | End: 2021-10-06 | Stop reason: HOSPADM

## 2021-09-29 RX ORDER — NIMODIPINE 30 MG/1
60 CAPSULE, LIQUID FILLED ORAL
Status: DISCONTINUED | OUTPATIENT
Start: 2021-09-29 | End: 2021-10-06 | Stop reason: HOSPADM

## 2021-09-29 RX ORDER — SENNA AND DOCUSATE SODIUM 50; 8.6 MG/1; MG/1
2 TABLET, FILM COATED ORAL DAILY
Status: DISCONTINUED | OUTPATIENT
Start: 2021-09-29 | End: 2021-10-06 | Stop reason: HOSPADM

## 2021-09-29 RX ORDER — POLYETHYLENE GLYCOL 3350 17 G/17G
17 POWDER, FOR SOLUTION ORAL DAILY
Status: DISCONTINUED | OUTPATIENT
Start: 2021-09-29 | End: 2021-10-06 | Stop reason: HOSPADM

## 2021-09-29 RX ORDER — LEVETIRACETAM 500 MG/1
1000 TABLET ORAL 2 TIMES DAILY
Status: DISCONTINUED | OUTPATIENT
Start: 2021-09-29 | End: 2021-10-06 | Stop reason: HOSPADM

## 2021-09-29 RX ORDER — HYDRALAZINE HYDROCHLORIDE 20 MG/ML
10 INJECTION INTRAMUSCULAR; INTRAVENOUS EVERY 10 MIN PRN
Status: DISCONTINUED | OUTPATIENT
Start: 2021-09-29 | End: 2021-10-06 | Stop reason: HOSPADM

## 2021-09-29 RX ORDER — CARVEDILOL 6.25 MG/1
6.25 TABLET ORAL 2 TIMES DAILY WITH MEALS
Status: DISCONTINUED | OUTPATIENT
Start: 2021-09-29 | End: 2021-09-29

## 2021-09-29 RX ORDER — BUTALBITAL, ACETAMINOPHEN AND CAFFEINE 50; 325; 40 MG/1; MG/1; MG/1
1 TABLET ORAL EVERY 4 HOURS PRN
Status: DISCONTINUED | OUTPATIENT
Start: 2021-09-29 | End: 2021-10-03

## 2021-09-29 RX ADMIN — NIMODIPINE 60 MG: 30 CAPSULE, LIQUID FILLED ORAL at 12:26

## 2021-09-29 RX ADMIN — LEVETIRACETAM 1000 MG: 500 TABLET, FILM COATED ORAL at 20:28

## 2021-09-29 RX ADMIN — LORAZEPAM 2 MG: 2 INJECTION INTRAMUSCULAR; INTRAVENOUS at 00:49

## 2021-09-29 RX ADMIN — NIMODIPINE 60 MG: 30 CAPSULE, LIQUID FILLED ORAL at 20:29

## 2021-09-29 RX ADMIN — Medication 10 ML: at 20:29

## 2021-09-29 RX ADMIN — POLYETHYLENE GLYCOL 3350 17 G: 17 POWDER, FOR SOLUTION ORAL at 20:54

## 2021-09-29 RX ADMIN — LORAZEPAM 0.5 MG: 1 TABLET ORAL at 22:18

## 2021-09-29 RX ADMIN — BUTALBITAL, ACETAMINOPHEN, AND CAFFEINE 1 TABLET: 50; 325; 40 TABLET ORAL at 16:17

## 2021-09-29 RX ADMIN — DOCUSATE SODIUM 50 MG AND SENNOSIDES 8.6 MG 2 TABLET: 8.6; 5 TABLET, FILM COATED ORAL at 20:54

## 2021-09-29 RX ADMIN — BUTALBITAL, ACETAMINOPHEN, AND CAFFEINE 1 TABLET: 50; 325; 40 TABLET ORAL at 08:46

## 2021-09-29 RX ADMIN — BUTALBITAL, ACETAMINOPHEN, AND CAFFEINE 1 TABLET: 50; 325; 40 TABLET ORAL at 20:20

## 2021-09-29 RX ADMIN — MORPHINE SULFATE 2 MG: 2 INJECTION, SOLUTION INTRAMUSCULAR; INTRAVENOUS at 22:18

## 2021-09-29 RX ADMIN — NICARDIPINE HYDROCHLORIDE 11 MG/HR: 25 INJECTION, SOLUTION INTRAVENOUS at 03:20

## 2021-09-29 RX ADMIN — LEVETIRACETAM 1000 MG: 500 TABLET, FILM COATED ORAL at 08:46

## 2021-09-29 RX ADMIN — Medication 10 ML: at 08:54

## 2021-09-29 RX ADMIN — MORPHINE SULFATE 2 MG: 2 INJECTION, SOLUTION INTRAMUSCULAR; INTRAVENOUS at 02:45

## 2021-09-29 RX ADMIN — NIMODIPINE 60 MG: 30 CAPSULE, LIQUID FILLED ORAL at 16:17

## 2021-09-29 ASSESSMENT — PAIN SCALES - GENERAL
PAINLEVEL_OUTOF10: 7
PAINLEVEL_OUTOF10: 2
PAINLEVEL_OUTOF10: 7
PAINLEVEL_OUTOF10: 3
PAINLEVEL_OUTOF10: 9
PAINLEVEL_OUTOF10: 3
PAINLEVEL_OUTOF10: 7
PAINLEVEL_OUTOF10: 4
PAINLEVEL_OUTOF10: 2
PAINLEVEL_OUTOF10: 8
PAINLEVEL_OUTOF10: 10

## 2021-09-29 ASSESSMENT — PAIN DESCRIPTION - LOCATION
LOCATION: NECK
LOCATION: HEAD;BACK
LOCATION: NECK

## 2021-09-29 ASSESSMENT — PAIN DESCRIPTION - ONSET: ONSET: SUDDEN

## 2021-09-29 ASSESSMENT — PAIN DESCRIPTION - PROGRESSION: CLINICAL_PROGRESSION: NOT CHANGED

## 2021-09-29 ASSESSMENT — PAIN DESCRIPTION - FREQUENCY: FREQUENCY: CONTINUOUS

## 2021-09-29 ASSESSMENT — PAIN DESCRIPTION - PAIN TYPE
TYPE: ACUTE PAIN
TYPE: ACUTE PAIN
TYPE: ACUTE PAIN;CHRONIC PAIN

## 2021-09-29 ASSESSMENT — PAIN - FUNCTIONAL ASSESSMENT: PAIN_FUNCTIONAL_ASSESSMENT: PREVENTS OR INTERFERES SOME ACTIVE ACTIVITIES AND ADLS

## 2021-09-29 ASSESSMENT — PAIN DESCRIPTION - DESCRIPTORS: DESCRIPTORS: ACHING;DISCOMFORT;HEADACHE

## 2021-09-29 ASSESSMENT — PAIN DESCRIPTION - ORIENTATION: ORIENTATION: POSTERIOR

## 2021-09-29 NOTE — FLOWSHEET NOTE
Pt complains of \"Stiff neck\" painful. Pt unable to place chin to chest, states \"I cant it hurts. \" Dr. Hieu Meléndez outside of pts room, notified of patient complaint and findings. Dr. Hieu Meléndez also updated of [facility] calling notification of \"No bed available at this time. \"

## 2021-09-29 NOTE — PLAN OF CARE
Problem: Falls - Risk of:  Goal: Will remain free from falls  Description: Will remain free from falls  Outcome: Met This Shift  Goal: Absence of physical injury  Description: Absence of physical injury  Outcome: Met This Shift     Problem: Pain:  Goal: Pain level will decrease  Description: Pain level will decrease  Outcome: Met This Shift  Goal: Control of acute pain  Description: Control of acute pain  Outcome: Met This Shift  Goal: Control of chronic pain  Description: Control of chronic pain  Outcome: Met This Shift     Problem: HEMODYNAMIC STATUS  Goal: Patient has stable vital signs and fluid balance  Outcome: Met This Shift     Problem: ACTIVITY INTOLERANCE/IMPAIRED MOBILITY  Goal: Mobility/activity is maintained at optimum level for patient  Outcome: Met This Shift     Problem: COMMUNICATION IMPAIRMENT  Goal: Ability to express needs and understand communication  Outcome: Met This Shift     Problem:  Bowel/Gastric:  Goal: Control of bowel function will improve  Description: Control of bowel function will improve  Outcome: Met This Shift  Goal: Ability to achieve a regular elimination pattern will improve  Description: Ability to achieve a regular elimination pattern will improve  Outcome: Met This Shift     Problem: Nutritional:  Goal: Ability to follow a diet with enough fiber (20 to 30 grams) for normal bowel function will improve  Description: Ability to follow a diet with enough fiber (20 to 30 grams) for normal bowel function will improve  Outcome: Met This Shift     Problem: Skin Integrity:  Goal: Risk for impaired skin integrity will decrease  Description: Risk for impaired skin integrity will decrease  Outcome: Met This Shift  Goal: Will show no infection signs and symptoms  Description: Will show no infection signs and symptoms  Outcome: Met This Shift  Goal: Absence of new skin breakdown  Description: Absence of new skin breakdown  Outcome: Met This Shift     Problem: Anxiety:  Goal: Level of anxiety will decrease  Description: Level of anxiety will decrease  Outcome: Met This Shift

## 2021-09-29 NOTE — PLAN OF CARE
Problem: Falls - Risk of:  Goal: Will remain free from falls  Description: Will remain free from falls  9/29/2021 0105 by Jeanie Bassett RN  Outcome: Met This Shift  9/28/2021 1721 by Sebastian Palacio RN  Outcome: Met This Shift  Goal: Absence of physical injury  Description: Absence of physical injury  9/29/2021 0105 by Jeanie Bassett RN  Outcome: Met This Shift  9/28/2021 1721 by Sebastian Palacio RN  Outcome: Met This Shift     Problem: Pain:  Description: Pain management should include both nonpharmacologic and pharmacologic interventions. Goal: Pain level will decrease  Description: Pain level will decrease  9/29/2021 0105 by Jeanie Bassett RN  Outcome: Met This Shift  9/28/2021 1721 by Sebastian Palacio RN  Outcome: Met This Shift  Goal: Control of acute pain  Description: Control of acute pain  9/29/2021 0105 by Jeanie Bassett RN  Outcome: Met This Shift  9/28/2021 1721 by Sebastian Palacio RN  Outcome: Met This Shift  Goal: Control of chronic pain  Description: Control of chronic pain  9/29/2021 0105 by Jeanie Bassett RN  Outcome: Met This Shift  9/28/2021 1721 by Sebastian Palacio RN  Outcome: Met This Shift     Problem: HEMODYNAMIC STATUS  Goal: Patient has stable vital signs and fluid balance  9/29/2021 0105 by Jeanie Bassett RN  Outcome: Met This Shift  9/28/2021 1721 by Sebastian Palacio RN  Outcome: Met This Shift     Problem: COMMUNICATION IMPAIRMENT  Goal: Ability to express needs and understand communication  9/29/2021 0105 by Jeanie Bassett RN  Outcome: Met This Shift  9/28/2021 1721 by Sebastian Palacio RN  Outcome: Met This Shift     Problem:  Bowel/Gastric:  Goal: Control of bowel function will improve  Description: Control of bowel function will improve  9/29/2021 0105 by Jeanie Bassett RN  Outcome: Met This Shift  9/28/2021 1721 by Sebastian Palacio RN  Outcome: Met This Shift  Goal: Ability to achieve a regular elimination pattern will improve  Description: Ability to achieve a regular elimination pattern will improve  9/29/2021 0105 by Alyson Blackman RN  Outcome: Met This Shift  9/28/2021 1721 by Zoran Mccormick RN  Outcome: Met This Shift     Problem: Nutritional:  Goal: Ability to follow a diet with enough fiber (20 to 30 grams) for normal bowel function will improve  Description: Ability to follow a diet with enough fiber (20 to 30 grams) for normal bowel function will improve  9/29/2021 0105 by Alyson Blackman RN  Outcome: Met This Shift  9/28/2021 1721 by Zoran Mccormick RN  Outcome: Met This Shift     Problem: Nutritional:  Goal: Ability to follow a diet with enough fiber (20 to 30 grams) for normal bowel function will improve  Description: Ability to follow a diet with enough fiber (20 to 30 grams) for normal bowel function will improve  9/29/2021 0105 by Alyson Blackman RN  Outcome: Met This Shift  9/28/2021 1721 by Zoran Mccormick RN  Outcome: Met This Shift     Problem: Skin Integrity:  Goal: Risk for impaired skin integrity will decrease  Description: Risk for impaired skin integrity will decrease  9/29/2021 0105 by Alyson Blackman RN  Outcome: Met This Shift  9/28/2021 1721 by Zoran Mccormick RN  Outcome: Met This Shift  Goal: Will show no infection signs and symptoms  Description: Will show no infection signs and symptoms  9/29/2021 0105 by Alyson Blackman RN  Outcome: Met This Shift  9/28/2021 1721 by Zoran Mccormick RN  Outcome: Met This Shift  Goal: Absence of new skin breakdown  Description: Absence of new skin breakdown  9/29/2021 0105 by Alyson Blackman RN  Outcome: Met This Shift  9/28/2021 1721 by Zoran Mccormick RN  Outcome: Met This Shift     Problem: Anxiety:  Goal: Level of anxiety will decrease  Description: Level of anxiety will decrease  9/29/2021 0105 by Alyson Blackman RN  Outcome: Met This Shift  9/28/2021 1721 by Zoran Mccormick RN  Outcome: Met This Shift

## 2021-09-29 NOTE — PROGRESS NOTES
Janet Georges is a 48 y.o.  female     Neurology is following for subarachnoid hemorrhage. She presented after the onset of a severe headache. CT of the head showed extra-axial hyperdensity anterior to the brainstem in the inferior aspect of the kayla. CTA of the head and neck did not reveal any aneurysm. Diagnostic angio did not show any obvious aneurysm, but study was compromised due to patient's inability to cooperate. Vitals have been stable. She was intermittently hypertensive yesterday. She is awaiting a bed at [facility]. Currently, she is still complaining of a bad headache but states it is slightly improved compared to yesterday    Medically, she has a history of hypertension, hyperlipidemia, sleep apnea    No family at bedside    + Headache  + Back pain  Review of systems otherwise negative    Allergies   Allergen Reactions    No Known Allergies      Objective:       BP (!) 102/58   Pulse 89   Temp 98.2 °F (36.8 °C) (Axillary)   Resp 24   Ht 5' 8\" (1.727 m)   Wt (!) 394 lb 10 oz (179 kg)   SpO2 94%   BMI 60.00 kg/m²     General: Patient appears in uncomfortable and in moderate distress  HEENT: Normocephalic, atraumatic  Chest: Clear to auscultation bilaterally  Heart: Regular rate and rhythm, no murmurs appreciated  Extremities: No edema or cyanosis noted    Mental Status: Alert. Oriented to self, place, year. Follows simple commands well.      Appropriate attention/concentration  Intact fundus of knowledge  Repetition intact  Intact memories    Speech: Slow, mild to moderate dysarthria   Language: No aphasia     Cranial Nerves:  I: smell    II: visual acuity     II: visual fields Full to confrontation   II: pupils KAITLYNN   III,VII: ptosis None   III,IV,VI: extraocular muscles  Full ROM   V: mastication Normal   V: facial light touch sensation  Normal   V,VII: corneal reflex     VII: facial muscle function - upper  Normal   VII: facial muscle function - lower Normal   VIII: hearing Normal IX: soft palate elevation  Normal   IX,X: gag reflex    XI: trapezius strength  5/5   XI: sternocleidomastoid strength 5/5   XI: neck extension strength  5/5   XII: tongue strength  Normal     Motor:  Poor effort with strength testing 2/2 headache and back pain  Grossly moves arms symmetrically   Legs weaker, but symmetric    No abnormal movements     Sensory:  Intact to LT distally in all limbs     Coordination:   FFM symmetrical   Trouble with FNF bilaterally     DTR:     No Babinskis  No Castañeda's    No pathological reflexes    Laboratory/Radiology:     CBC with Differential:    Lab Results   Component Value Date    WBC 9.2 09/29/2021    RBC 3.83 09/29/2021    HGB 10.5 09/29/2021    HCT 32.4 09/29/2021     09/29/2021    MCV 84.6 09/29/2021    MCH 27.4 09/29/2021    MCHC 32.4 09/29/2021    RDW 13.0 09/29/2021    LYMPHOPCT 15.5 09/28/2021    MONOPCT 8.9 09/28/2021    BASOPCT 0.6 09/28/2021    MONOSABS 0.97 09/28/2021    LYMPHSABS 1.68 09/28/2021    EOSABS 0.35 09/28/2021    BASOSABS 0.07 09/28/2021     CMP:    Lab Results   Component Value Date     09/29/2021    K 3.6 09/29/2021    K 3.8 06/22/2020     09/29/2021    CO2 22 09/29/2021    BUN 6 09/29/2021    CREATININE 0.6 09/29/2021    GFRAA >60 09/29/2021    LABGLOM >60 09/29/2021    GLUCOSE 116 09/29/2021    PROT 7.0 09/28/2021    LABALBU 4.6 09/28/2021    CALCIUM 8.2 09/29/2021    BILITOT 0.8 09/28/2021    ALKPHOS 113 09/28/2021    AST 37 09/28/2021    ALT 38 09/28/2021     Cerebral Angiogram   1. Angiogram demonstrates no gross abnormality. Severely compromised  evaluation of the posterior fossa. Repeat study is suggested when the  patient is able cooperate in 7-14 days. Alternatively this study could  be repeated under general anesthesia.     CT head  Small amount of acute subarachnoid hemorrhage along the anterior brainstem is  similar to the prior study.  Questionable small amount of scattered acute  subarachnoid hemorrhage in the sulci of both cerebral hemispheres, also  similar to the prior study.  No definite new intracranial hemorrhage.     No mass effect or midline shift.     I personally reviewed all labs and images today   Assessment:     Atraumatic subarachnoid hemorrhage suspected 2/2 non imaged intracranial aneurysm   White and Kim grade 2     Plan:     Patient awaiting bed at [address] [place] [address] Road SBP < 130     Nimodipine for vasospasm prevention     Continue Ab Marion PA-C  11:00 AM  9/29/2021

## 2021-09-29 NOTE — PROGRESS NOTES
Department of Neurosurgery  Progress Note    CHIEF COMPLAINT: SAH    SUBJECTIVE:  No acute events overnight. Patient still complains of neck pain and photophobia. Is able to put chin to neck. Patient also complains of talking weird. Denies any bowel or bladder incontinence. Angio reviewed. REVIEW OF SYSTEMS :  Constitutional: Negative for chills and fever. Neurological: Negative for dizziness and tremors, (+) HA and neck pain      OBJECTIVE:   VITALS:  /66   Pulse 91   Temp 98.7 °F (37.1 °C) (Axillary)   Resp 26   Ht 5' 8\" (1.727 m)   Wt (!) 394 lb 10 oz (179 kg)   SpO2 98%   BMI 60.00 kg/m²     PHYSICAL:  Alert, oriented  Appears stated age  PERRL  EOMI  Strength full  Sensation intact to light touch  (-) pronator drift.    Neck more supple today    DATA:  CBC:   Lab Results   Component Value Date    WBC 9.2 09/29/2021    RBC 3.83 09/29/2021    HGB 10.5 09/29/2021    HCT 32.4 09/29/2021    MCV 84.6 09/29/2021    MCH 27.4 09/29/2021    MCHC 32.4 09/29/2021    RDW 13.0 09/29/2021     09/29/2021    MPV 10.1 09/29/2021     BMP:    Lab Results   Component Value Date     09/29/2021    K 3.6 09/29/2021    K 3.8 06/22/2020     09/29/2021    CO2 22 09/29/2021    BUN 6 09/29/2021    LABALBU 4.6 09/28/2021    CREATININE 0.6 09/29/2021    CALCIUM 8.2 09/29/2021    GFRAA >60 09/29/2021    LABGLOM >60 09/29/2021    GLUCOSE 116 09/29/2021     PT/INR:    Lab Results   Component Value Date    PROTIME 11.7 09/27/2021    INR 1.1 09/27/2021     PTT:    Lab Results   Component Value Date    APTT 21.2 09/27/2021   [APTT}    Current Inpatient Medications  Current Facility-Administered Medications: butalbital-acetaminophen-caffeine (FIORICET, ESGIC) per tablet 1 tablet, 1 tablet, Oral, Q4H PRN  levETIRAcetam (KEPPRA) tablet 1,000 mg, 1,000 mg, Oral, BID  labetalol (NORMODYNE;TRANDATE) injection 10 mg, 10 mg, IntraVENous, Q4H PRN  hydrALAZINE (APRESOLINE) injection 10 mg, 10 mg, IntraVENous, Q4H PRN  LORazepam (ATIVAN) tablet 0.5 mg, 0.5 mg, Oral, Q4H PRN  niCARdipine (CARDENE) 50 mg in sodium chloride 0.9 % 250 mL infusion, 3-15 mg/hr, IntraVENous, Continuous  sodium chloride flush 0.9 % injection 5-40 mL, 5-40 mL, IntraVENous, 2 times per day  sodium chloride flush 0.9 % injection 5-40 mL, 5-40 mL, IntraVENous, PRN  0.9 % sodium chloride infusion, 25 mL, IntraVENous, PRN  LORazepam (ATIVAN) injection 2 mg, 2 mg, IntraVENous, Q4H PRN  morphine (PF) injection 2 mg, 2 mg, IntraVENous, Q4H PRN  0.9 % sodium chloride infusion, , IntraVENous, Continuous    ASSESSMENT:   - Marlee Oliva 49 y/o female who LP (+) for SAH secondary to non-imaged intracranial aneurysm. PLAN:  -Patient will need to be transferred to unit facility with neuro ICU capabilities- Patient accepted at [facility] awaiting bed. -Nimodipine for vasospasm prevention.   -Keep SBP <140   -Continue Keppra  -No AP/AC or NSAID medications  -Will need repeat angio in 7-14 days. Electronically signed by Joesphine Litten, [place] on 9/29/2021 at 9:08 AM     Nsx Attending:    Patient was seen and examined by me with the team.  I personally reviewed all pertinent radiological images. I concur with Miss Lee's clinical assessment and plan. We shall treat as above. Appreciate IR, CC, neurology and medicine input. Thank you so much for allowing us to participate in the care of this patient.

## 2021-09-29 NOTE — PROGRESS NOTES
Surgical  Neuro Science Intensive Care Unit  Critical Care  Daily Progress Note 9/29/2021    Date of Admission: 09/27/2021    CC: Follow up for [facility]. HOSPITAL COURSE/OVERNIGHT EVENTS:    9/27  Patient presented secondary to general body aches found to have a subarachnoid hemorrhage secondary to intracranial aneurysm. Started on nicardipine drip to help control blood pressure. Patient developed acute symptomatic seizures secondary to brain bleed  9/28: Patient fell yesterday secondary to her knees giving out while getting up to go to the bathroom. Patient was redirected in told to lie in bed secondary to her subarachnoid bleed. Neurosurgery was asked to evaluate patient. We will continue Keppra. Patient's GCS is 14 -1 off her confusion today. 09/29  No issues overnight. Remains on Cardene. More alert this am.      PHYSICAL EXAM:   /66   Pulse 91   Temp 98.7 °F (37.1 °C) (Axillary)   Resp 26   Ht 5' 8\" (1.727 m)   Wt (!) 394 lb 10 oz (179 kg)   SpO2 98%   BMI 60.00 kg/m²     Intake/Output Summary (Last 24 hours) at 9/29/2021 0931  Last data filed at 9/29/2021 0600  Gross per 24 hour   Intake 5162 ml   Output 3150 ml   Net 2012 ml     General appearance:  Comfortable. Pain Description: moderate and severe neck pain      NEUROLOGIC:   RASS Score:  0  GCS:  15  4 - Opens eyes on own   6 - Follows simple motor commands  5 - Alert and oriented       Pupil size:  Left 3 mm  Right 3 mm  Pupil reaction: Yes   PERRLA  Wiggles fingers: Left   Yes  Right Yes  Hand grasp:   Left: Yes     Right    Yes  Wiggles toes: Left   Yes Right  Yes  Plantar flexion: Left  Yes  Right   Yes    CONSTITUTIONAL: No acute distress, lying in hospital bed. CARDIOVASCULAR: S1 S2, regular rate, regular rhythm, no murmur/gallop/rub. Monitor: NSR. PULMONARY: Bilaterally clear. No rhonchi/rales/wheezes, no use of accessory muscles. O2 at 2 L nc. RENAL: Voids. Female insentience device.  Fluid balance for previous 24 hours:+ 2012 ml. ABDOMEN: Soft, nontender, nondistended, nontympanic, normal bowel sounds. Diet:  General.  No reported nausea or vomiting. MUSCULOSKELETAL:  Moves all extremities purposefully, 5/5 strength   SKIN/EXTREMITIES: No rashes/ecchymosis, no edema/clubbing, warm/dry, good capillary refill. LINES:  Peripheral     Recent Labs     09/27/21  0411 09/28/21  0516 09/29/21  0500   WBC 8.2 10.9 9.2   HGB 10.8* 11.2* 10.5*   HCT 33.6* 34.5 32.4*   MCV 84.6 83.3 84.6    283 242       Recent Labs     09/28/21  1548 09/28/21  2250 09/29/21  0500    137 136   K 3.6 3.4* 3.6   CO2 26 27 22   PHOS  --   --  3.2   BUN 8 6 6   CREATININE 0.7 0.6 0.6       Recent Labs     09/27/21  0248 09/27/21  0601 09/28/21  0516   PROT 6.6  --  7.0   INR  --  1.1  --    LIPASE 16  --   --        ASSESSMENT/PLAN:     Principal Problem:    SAH (subarachnoid hemorrhage) (HCC)  Active Problems:    Bipolar disorder ([facility] [address]    HTN (hypertension), benign    Mixed hyperlipidemia    Morbid obesity with BMI of 60.0-69.9, adult (HCC)    Lactic acid acidosis    Hypokalemia    Seizure (HCC)  Resolved Problems:    * No resolved hospital problems. *    Neuro:  SAH. Seizures. Monitor neuro status. Neurosurgery following. Neurology following. Keppra for seizure prophylaxis. Stroke education. Stroke protocol. Nimodipine for cerebral artery spasms. Daily Transcranial US. Speech therapy   CV:  Hypertensive emergency. Hx of HTN and Hyperlipidemia    Monitor hemodynamics. BP goal systolic less than 572 mm Hg. PRN hydralzine & labetalol. Statin. Cardene  Coreg. Pulm: No acute issues. Hx of sleep apnea. Monitor RR & SpO2. O2 as needed. Encourage cough, SMI  & deep breathing. GI: No acute issues. Morbid obesity. BMI 60. Hx of GERD & diverticulosis. I   Monitor bowel function. Diet: Regular. Bowel regime. Renal:  No acute issues.     Monitor BUN & Cr, electrolytes & replace as needed. Monitor I & O.    Voids. ID: No acute issues  Endocrine: No acute issues. Monitor BS.    MSK: No acute issues.    ROM. Turn & reposition. PT & OT pending recommendations  Monitor for skin breakdown. Heme: No acute issues. Monitor CBC. Bowel regime: none. Pain control/Sedation: Morphine IVP, Tylenol, Ativan and Fioricet  DVT prophylaxis: SCD and No Lovenox/Heparin at this time due to [facility]. GI prophylaxis: Diet  Ancillary consults:  Medicine. Neurosurgery. Neurology. Critical care. Patient/Family update: Will update when available. Code status: Full code. Disposition:  Continue ICU.       Electronically signed by Dalton Schuamcher RN MSN APRN-NP [facility] NP  CCNS CCRN 9/29/2021 10:51 AM

## 2021-09-29 NOTE — PROGRESS NOTES
Chief Complaint:  Chief Complaint   Patient presents with    Generalized Body Aches     Pain from head to legs, states tingling in legs. Knee replacement right side in July. Took \" a whole bunch of Oxy 2 nights ago plus drank alcohol\"  Pt diaphoretic upon EMS arrival     [facility] (subarachnoid hemorrhage) ([address]     Subjective:    I saw pt as she was coming out of IR suite    Slightly drowsy but awake and interactive    States HA and photophobia are better. Neck stiffness is about the same. No new weakness/numbness. No N/V    Objective:    BP (!) 116/99   Pulse 95   Temp 98.7 °F (37.1 °C) (Axillary)   Resp 26   Ht 5' 8\" (1.727 m)   Wt (!) 394 lb 10 oz (179 kg)   SpO2 95%   BMI 60.00 kg/m²     Current medications that patient is taking have been reviewed. General appearance: NAD, morbidly obese  HEENT: AT/NC, MMM  Neck: FROM, supple  Lungs: Clear to auscultation anteriorly, WOB normal  CV: RRR, no MRGs  Abdomen: Soft, non-tender; no masses or HSM, +BS  Extremities: No peripheral edema or digital cyanosis  Skin: no rash, lesions or ulcers  Psych: Calm and cooperative  Neuro: Alert and interactive, face symmetric, EOMI, handgrips equal b/l, foot plantar/dorsiflexors equal b/l, though effort poor throughout.       Labs:  CBC with Differential:    Lab Results   Component Value Date    WBC 10.9 09/28/2021    RBC 4.14 09/28/2021    HGB 11.2 09/28/2021    HCT 34.5 09/28/2021     09/28/2021    MCV 83.3 09/28/2021    MCH 27.1 09/28/2021    MCHC 32.5 09/28/2021    RDW 13.3 09/28/2021    LYMPHOPCT 15.5 09/28/2021    MONOPCT 8.9 09/28/2021    BASOPCT 0.6 09/28/2021    MONOSABS 0.97 09/28/2021    LYMPHSABS 1.68 09/28/2021    EOSABS 0.35 09/28/2021    BASOSABS 0.07 09/28/2021     CMP:    Lab Results   Component Value Date     09/28/2021    K 3.6 09/28/2021    K 3.8 06/22/2020     09/28/2021    CO2 26 09/28/2021    BUN 8 09/28/2021    CREATININE 0.7 09/28/2021    GFRAA >60 09/28/2021    LABGLOM >60 09/28/2021    GLUCOSE 119 09/28/2021    PROT 7.0 09/28/2021    LABALBU 4.6 09/28/2021    CALCIUM 9.0 09/28/2021    BILITOT 0.8 09/28/2021    ALKPHOS 113 09/28/2021    AST 37 09/28/2021    ALT 38 09/28/2021        Imaging:  I've personally reviewed the patient's CT head  Small amount of acute subarachnoid hemorrhage along the anterior brainstem is   similar to the prior study.  Questionable small amount of scattered acute   subarachnoid hemorrhage in the sulci of both cerebral hemispheres, also   similar to the prior study.  No definite new intracranial hemorrhage.       No mass effect or midline shift. Assessment/Plan:  Principal Problem:    SAH (subarachnoid hemorrhage) (HCC)  Active Problems:    Bipolar disorder ([facility] [address]    HTN (hypertension), benign    Mixed hyperlipidemia    Morbid obesity with BMI of 60.0-69.9, adult (HCC)    Lactic acid acidosis    Hypokalemia    Seizure ([facility] [address]  Resolved Problems:    * No resolved hospital problems. *       BP still going up at times. I've added PRN labetalol and hydralazine in addition to her nifedipine gtt which has been maxed out at times    No aneurysm identified on cerebral angiogram today    Patient accepted at [facility] d/w IR and Neurosurgery. Though no aneurysm was found, the recommendation is to still pursue transfer as we do not have the resources to treat her here if the bleed worsens    Continue Keppra    Low dose Ativan PRN agitation; high dose PRN seizure.     Requires continued inpatient level of care     Hannah Weston MD    8:55 PM  9/28/2021

## 2021-09-29 NOTE — PROGRESS NOTES
Chief Complaint:  Chief Complaint   Patient presents with    Generalized Body Aches     Pain from head to legs, states tingling in legs. Knee replacement right side in July. Took \" a whole bunch of Oxy 2 nights ago plus drank alcohol\"  Pt diaphoretic upon EMS arrival     [facility] (subarachnoid hemorrhage) (HCC)     Subjective:    Complains of headache. No photo/phonophobia. No weakness/numbness    Objective:    /79   Pulse 84   Temp 98.4 °F (36.9 °C)   Resp 17   Ht 5' 8\" (1.727 m)   Wt (!) 394 lb 10 oz (179 kg)   SpO2 96%   BMI 60.00 kg/m²     Current medications that patient is taking have been reviewed. General appearance: NAD, morbidly obese  HEENT: AT/NC, MMM  Neck: FROM, supple  Lungs: Clear to auscultation anteriorly, WOB normal  CV: RRR, no MRGs  Abdomen: Soft, non-tender; no masses or HSM, +BS  Extremities: No peripheral edema or digital cyanosis  Skin: no rash, lesions or ulcers  Psych: Calm and cooperative  Neuro: Alert and interactive, face symmetric, EOMI, handgrips equal b/l, foot plantar/dorsiflexors equal b/l, though effort poor throughout.       Labs:  CBC with Differential:    Lab Results   Component Value Date    WBC 9.2 09/29/2021    RBC 3.83 09/29/2021    HGB 10.5 09/29/2021    HCT 32.4 09/29/2021     09/29/2021    MCV 84.6 09/29/2021    MCH 27.4 09/29/2021    MCHC 32.4 09/29/2021    RDW 13.0 09/29/2021    LYMPHOPCT 15.5 09/28/2021    MONOPCT 8.9 09/28/2021    BASOPCT 0.6 09/28/2021    MONOSABS 0.97 09/28/2021    LYMPHSABS 1.68 09/28/2021    EOSABS 0.35 09/28/2021    BASOSABS 0.07 09/28/2021     CMP:    Lab Results   Component Value Date     09/29/2021    K 3.2 09/29/2021    K 3.8 06/22/2020    CL 99 09/29/2021    CO2 19 09/29/2021    BUN 6 09/29/2021    CREATININE 0.5 09/29/2021    GFRAA >60 09/29/2021    LABGLOM >60 09/29/2021    GLUCOSE 113 09/29/2021    PROT 7.0 09/28/2021    LABALBU 4.6 09/28/2021    CALCIUM 8.5 09/29/2021    BILITOT 0.8 09/28/2021    ALKPHOS 113 09/28/2021    AST 37 09/28/2021    ALT 38 09/28/2021          Assessment/Plan:  Principal Problem:    SAH (subarachnoid hemorrhage) (HCC)  Active Problems:    Bipolar disorder ([facility] [address]    HTN (hypertension), benign    Mixed hyperlipidemia    Morbid obesity with BMI of 60.0-69.9, adult (HCC)    Lactic acid acidosis    Hypokalemia    Seizure ([facility] [address]  Resolved Problems:    * No resolved hospital problems. *       BP much better    Off nicardipine    Continue PO nimodipine    Patient accepted at [address]    Low dose Ativan PRN agitation; high dose PRN seizure.     Requires continued inpatient level of care     Baron Diana MD    3:23 PM  9/29/2021

## 2021-09-29 NOTE — CARE COORDINATION
Per MD note-presents to ed - states that yesterday she developed relatively acute onset of neck pain/neck stiffness associated with tingling in the hands and legs and generalized weakness. She denies any sort of head injury or falls. She reports some associated photophobia. She is slightly drowsy and postictal as she reportedly had just had a generalized tonic-clonic seizure. Neurosurgery consult-CT Head showed small amount of extra-axial hemorrhage, subdural versus subarachnoid. Stable. Plan: Continue pain control, LP to evaluate for [facility] and bacterial etiology,Keppra for a total of 7 days,No AP/AC or NSAID medications. Neurology consult-female with atraumatic SAH suspected secondary to non-imaged intracranial aneurysm with acute symptomatic seizure. Plan-Agree with neurosurgery and internal medicine, patient would be well served by transfer to unit with neuroscience ICU capabilities. Continue to maintain SBP<140 with calcium channel blockers (nimodipine scheduled or nicardipine if drip needed). Continue Keppra 500 mg BID. Seizure safety precautions. Plan for repeat CT head w/o contrast today. Will likely need catheter angiogram for further occult aneurysm evaluation. Accepted @ . Continues on cardene drip. .Atttempted to meet with patient- very sleepy,groggy @ present. Will continue to follow. Accepted @ . Await bed. Ambulance form and envelope in soft chart. Electronically signed by Aster Richardson RN on 9/29/2021 at 9:54 AM

## 2021-09-30 ENCOUNTER — APPOINTMENT (OUTPATIENT)
Dept: ULTRASOUND IMAGING | Age: 46
DRG: 065 | End: 2021-09-30
Payer: COMMERCIAL

## 2021-09-30 ENCOUNTER — APPOINTMENT (OUTPATIENT)
Dept: CT IMAGING | Age: 46
DRG: 065 | End: 2021-09-30
Payer: COMMERCIAL

## 2021-09-30 LAB
ANION GAP SERPL CALCULATED.3IONS-SCNC: 14 MMOL/L (ref 7–16)
BUN BLDV-MCNC: 8 MG/DL (ref 6–20)
CALCIUM IONIZED: 1.2 MMOL/L (ref 1.15–1.33)
CALCIUM SERPL-MCNC: 8.8 MG/DL (ref 8.6–10.2)
CHLORIDE BLD-SCNC: 98 MMOL/L (ref 98–107)
CO2: 24 MMOL/L (ref 22–29)
CREAT SERPL-MCNC: 0.6 MG/DL (ref 0.5–1)
GFR AFRICAN AMERICAN: >60
GFR NON-AFRICAN AMERICAN: >60 ML/MIN/1.73
GLUCOSE BLD-MCNC: 128 MG/DL (ref 74–99)
HCT VFR BLD CALC: 34.7 % (ref 34–48)
HEMOGLOBIN: 11.4 G/DL (ref 11.5–15.5)
MAGNESIUM: 2.2 MG/DL (ref 1.6–2.6)
MCH RBC QN AUTO: 27.5 PG (ref 26–35)
MCHC RBC AUTO-ENTMCNC: 32.9 % (ref 32–34.5)
MCV RBC AUTO: 83.8 FL (ref 80–99.9)
PDW BLD-RTO: 13 FL (ref 11.5–15)
PHOSPHORUS: 3.2 MG/DL (ref 2.5–4.5)
PLATELET # BLD: 251 E9/L (ref 130–450)
PMV BLD AUTO: 10.1 FL (ref 7–12)
POTASSIUM SERPL-SCNC: 3 MMOL/L (ref 3.5–5)
RBC # BLD: 4.14 E12/L (ref 3.5–5.5)
SODIUM BLD-SCNC: 136 MMOL/L (ref 132–146)
WBC # BLD: 8.3 E9/L (ref 4.5–11.5)

## 2021-09-30 PROCEDURE — 85027 COMPLETE CBC AUTOMATED: CPT

## 2021-09-30 PROCEDURE — 83735 ASSAY OF MAGNESIUM: CPT

## 2021-09-30 PROCEDURE — 6360000002 HC RX W HCPCS: Performed by: NURSE PRACTITIONER

## 2021-09-30 PROCEDURE — 97530 THERAPEUTIC ACTIVITIES: CPT

## 2021-09-30 PROCEDURE — 2000000000 HC ICU R&B

## 2021-09-30 PROCEDURE — 2580000003 HC RX 258: Performed by: EMERGENCY MEDICINE

## 2021-09-30 PROCEDURE — 97162 PT EVAL MOD COMPLEX 30 MIN: CPT

## 2021-09-30 PROCEDURE — 84100 ASSAY OF PHOSPHORUS: CPT

## 2021-09-30 PROCEDURE — 99291 CRITICAL CARE FIRST HOUR: CPT | Performed by: SURGERY

## 2021-09-30 PROCEDURE — 6370000000 HC RX 637 (ALT 250 FOR IP): Performed by: NURSE PRACTITIONER

## 2021-09-30 PROCEDURE — 82330 ASSAY OF CALCIUM: CPT

## 2021-09-30 PROCEDURE — 99233 SBSQ HOSP IP/OBS HIGH 50: CPT | Performed by: CLINICAL NURSE SPECIALIST

## 2021-09-30 PROCEDURE — 97166 OT EVAL MOD COMPLEX 45 MIN: CPT

## 2021-09-30 PROCEDURE — 80048 BASIC METABOLIC PNL TOTAL CA: CPT

## 2021-09-30 PROCEDURE — 93886 INTRACRANIAL COMPLETE STUDY: CPT | Performed by: RADIOLOGY

## 2021-09-30 PROCEDURE — 70450 CT HEAD/BRAIN W/O DYE: CPT

## 2021-09-30 PROCEDURE — 93886 INTRACRANIAL COMPLETE STUDY: CPT

## 2021-09-30 PROCEDURE — 6360000002 HC RX W HCPCS: Performed by: INTERNAL MEDICINE

## 2021-09-30 PROCEDURE — 2500000003 HC RX 250 WO HCPCS: Performed by: NURSE PRACTITIONER

## 2021-09-30 PROCEDURE — 6370000000 HC RX 637 (ALT 250 FOR IP): Performed by: SURGERY

## 2021-09-30 PROCEDURE — 6370000000 HC RX 637 (ALT 250 FOR IP): Performed by: NEUROLOGICAL SURGERY

## 2021-09-30 PROCEDURE — 36415 COLL VENOUS BLD VENIPUNCTURE: CPT

## 2021-09-30 PROCEDURE — 6370000000 HC RX 637 (ALT 250 FOR IP): Performed by: INTERNAL MEDICINE

## 2021-09-30 RX ORDER — ONDANSETRON 2 MG/ML
4 INJECTION INTRAMUSCULAR; INTRAVENOUS EVERY 6 HOURS PRN
Status: DISCONTINUED | OUTPATIENT
Start: 2021-09-30 | End: 2021-09-30

## 2021-09-30 RX ORDER — GABAPENTIN 600 MG/1
600 TABLET ORAL 3 TIMES DAILY
Status: ON HOLD | COMMUNITY
End: 2021-10-15 | Stop reason: HOSPADM

## 2021-09-30 RX ORDER — OMEPRAZOLE 40 MG/1
40 CAPSULE, DELAYED RELEASE ORAL DAILY
COMMUNITY

## 2021-09-30 RX ORDER — ONDANSETRON 2 MG/ML
INJECTION INTRAMUSCULAR; INTRAVENOUS
Status: DISPENSED
Start: 2021-09-30 | End: 2021-10-01

## 2021-09-30 RX ORDER — POTASSIUM CHLORIDE 20 MEQ/1
20 TABLET, EXTENDED RELEASE ORAL 3 TIMES DAILY
Status: DISCONTINUED | OUTPATIENT
Start: 2021-09-30 | End: 2021-10-06 | Stop reason: HOSPADM

## 2021-09-30 RX ORDER — GABAPENTIN 300 MG/1
600 CAPSULE ORAL 3 TIMES DAILY
Status: DISCONTINUED | OUTPATIENT
Start: 2021-09-30 | End: 2021-10-06 | Stop reason: HOSPADM

## 2021-09-30 RX ADMIN — GABAPENTIN 600 MG: 300 CAPSULE ORAL at 13:53

## 2021-09-30 RX ADMIN — BUTALBITAL, ACETAMINOPHEN, AND CAFFEINE 1 TABLET: 50; 325; 40 TABLET ORAL at 04:18

## 2021-09-30 RX ADMIN — LABETALOL HYDROCHLORIDE 10 MG: 5 INJECTION, SOLUTION INTRAVENOUS at 06:03

## 2021-09-30 RX ADMIN — NIMODIPINE 60 MG: 30 CAPSULE, LIQUID FILLED ORAL at 16:30

## 2021-09-30 RX ADMIN — POTASSIUM CHLORIDE 20 MEQ: 20 TABLET, EXTENDED RELEASE ORAL at 13:53

## 2021-09-30 RX ADMIN — Medication 10 ML: at 20:38

## 2021-09-30 RX ADMIN — BUTALBITAL, ACETAMINOPHEN, AND CAFFEINE 1 TABLET: 50; 325; 40 TABLET ORAL at 17:38

## 2021-09-30 RX ADMIN — NIMODIPINE 60 MG: 30 CAPSULE, LIQUID FILLED ORAL at 11:36

## 2021-09-30 RX ADMIN — MORPHINE SULFATE 2 MG: 2 INJECTION, SOLUTION INTRAMUSCULAR; INTRAVENOUS at 05:59

## 2021-09-30 RX ADMIN — Medication 10 ML: at 08:08

## 2021-09-30 RX ADMIN — MORPHINE SULFATE 2 MG: 2 INJECTION, SOLUTION INTRAMUSCULAR; INTRAVENOUS at 02:00

## 2021-09-30 RX ADMIN — GABAPENTIN 600 MG: 300 CAPSULE ORAL at 20:35

## 2021-09-30 RX ADMIN — POTASSIUM CHLORIDE 20 MEQ: 20 TABLET, EXTENDED RELEASE ORAL at 20:35

## 2021-09-30 RX ADMIN — TRIMETHOBENZAMIDE HYDROCHLORIDE 200 MG: 100 INJECTION INTRAMUSCULAR at 13:54

## 2021-09-30 RX ADMIN — NIMODIPINE 60 MG: 30 CAPSULE, LIQUID FILLED ORAL at 20:36

## 2021-09-30 RX ADMIN — DOCUSATE SODIUM 50 MG AND SENNOSIDES 8.6 MG 2 TABLET: 8.6; 5 TABLET, FILM COATED ORAL at 08:08

## 2021-09-30 RX ADMIN — LABETALOL HYDROCHLORIDE 10 MG: 5 INJECTION, SOLUTION INTRAVENOUS at 08:55

## 2021-09-30 RX ADMIN — BUTALBITAL, ACETAMINOPHEN, AND CAFFEINE 1 TABLET: 50; 325; 40 TABLET ORAL at 00:12

## 2021-09-30 RX ADMIN — LORAZEPAM 0.5 MG: 1 TABLET ORAL at 04:22

## 2021-09-30 RX ADMIN — LABETALOL HYDROCHLORIDE 10 MG: 5 INJECTION, SOLUTION INTRAVENOUS at 10:38

## 2021-09-30 RX ADMIN — POLYETHYLENE GLYCOL 3350 17 G: 17 POWDER, FOR SOLUTION ORAL at 08:09

## 2021-09-30 RX ADMIN — LORAZEPAM 2 MG: 2 INJECTION INTRAMUSCULAR; INTRAVENOUS at 21:14

## 2021-09-30 RX ADMIN — MORPHINE SULFATE 2 MG: 2 INJECTION, SOLUTION INTRAMUSCULAR; INTRAVENOUS at 20:09

## 2021-09-30 RX ADMIN — LEVETIRACETAM 1000 MG: 500 TABLET, FILM COATED ORAL at 08:09

## 2021-09-30 RX ADMIN — BUTALBITAL, ACETAMINOPHEN, AND CAFFEINE 1 TABLET: 50; 325; 40 TABLET ORAL at 08:08

## 2021-09-30 RX ADMIN — NIMODIPINE 60 MG: 30 CAPSULE, LIQUID FILLED ORAL at 04:18

## 2021-09-30 RX ADMIN — NIMODIPINE 60 MG: 30 CAPSULE, LIQUID FILLED ORAL at 08:07

## 2021-09-30 RX ADMIN — BUTALBITAL, ACETAMINOPHEN, AND CAFFEINE 1 TABLET: 50; 325; 40 TABLET ORAL at 12:26

## 2021-09-30 RX ADMIN — NIMODIPINE 60 MG: 30 CAPSULE, LIQUID FILLED ORAL at 00:12

## 2021-09-30 RX ADMIN — LEVETIRACETAM 1000 MG: 500 TABLET, FILM COATED ORAL at 20:35

## 2021-09-30 ASSESSMENT — PAIN SCALES - GENERAL
PAINLEVEL_OUTOF10: 7
PAINLEVEL_OUTOF10: 9
PAINLEVEL_OUTOF10: 6
PAINLEVEL_OUTOF10: 9
PAINLEVEL_OUTOF10: 7
PAINLEVEL_OUTOF10: 10
PAINLEVEL_OUTOF10: 7
PAINLEVEL_OUTOF10: 7
PAINLEVEL_OUTOF10: 6

## 2021-09-30 ASSESSMENT — PAIN DESCRIPTION - LOCATION
LOCATION: HEAD
LOCATION: HEAD

## 2021-09-30 ASSESSMENT — PAIN DESCRIPTION - DESCRIPTORS
DESCRIPTORS: HEADACHE
DESCRIPTORS: HEADACHE

## 2021-09-30 ASSESSMENT — PAIN DESCRIPTION - PAIN TYPE
TYPE: ACUTE PAIN
TYPE: ACUTE PAIN
TYPE: CHRONIC PAIN

## 2021-09-30 ASSESSMENT — PAIN DESCRIPTION - FREQUENCY: FREQUENCY: INTERMITTENT

## 2021-09-30 ASSESSMENT — PAIN DESCRIPTION - PROGRESSION: CLINICAL_PROGRESSION: GRADUALLY WORSENING

## 2021-09-30 NOTE — PROGRESS NOTES
[address]       Date:2021                                                               Patient Name: Sudha Nava  MRN: [id number]  : 1975  Room: [address] [place] [address]-A    Evaluating OT: Funmi Franklin OTR/L 1354    Referring Provider: HANG Croft - CNS   Specific Provider Orders/Date: OT eval and treat (21)       Diagnosis: [facility]     Reason for admission: generalized body aches (head to legs; tingling in legs)     Pertinent Medical History: HLD, HTN, diverticulosis, GERD, knee pain, morbid obesity, sleep apnea, bipolar, R TKR ()     *Precautions:  Fall Risk, seizures, chronic back pain, SBP<140, impulsive    Assessment of current deficits   [x] Functional mobility  [x]ADLs  [x] Strength               []Cognition   [x] Functional transfers   [x] IADLs         [x] Safety Awareness   [x]Endurance   [] Fine Coordination        [x] ROM     [] Vision/perception   []Sensation    []Gross Motor Coordination [x] Balance   [] Delirium                  []Motor Control     [] Communication    OT PLAN OF CARE   OT POC based on physician orders, patient diagnosis and results of clinical assessment.        Frequency/Duration: 1-3 days/wk for 1-2 weeks PRN    Specific OT Treatment to include:   ADL retraining/adapted techniques and AE recommendations to increase functional independence within precautions                    Energy conservation techniques to improve tolerance for selfcare routine   Functional transfer/mobility training/DME recommendations for increased independence, safety and fall prevention         Patient/family education to increase safety and functional independence within precautions              Environmental modifications for safe mobility and completion of ADL                           Cognitive retraining ex's to improve problem solving skills & safe participation in ADLs/IADLs  Sensory re-education techniques to improve extremity awareness, maintain skin integrity and improve hand functio                             Visual/Perceptual retraining  to improve body awareness and safety during transfers and ADLs  Splinting/positioning needs to maintain joint/skin integrity and prevent contractures  Therapeutic activity to improve functional performance during ADLs/IADLs                                         Therapeutic exercise to improve tolerance and functional strength for ADLs   Balance retraining exercises/tasks for facilitation of postural control with dynamic challenges during ADLs . Positioning to improve functional independence  Neuromuscular re-education: facilitation of righting/equilibrium reactions, normalization muscle tone/facilitation active functional movement                      Delirium prevention/treatmen    Modified Brianda Scale   Score     Description  0             No symptoms  1             No significant disability despite symptoms  2             Slight disability; able to look after own affairs  3             Moderate disability; able to ambulate without assist/ requires assist with ADLs  4             Moderate/Severe disability;requires assist to ambulate/assist with ADLs  5             Severe disability;bedridden/incontinent   6               Score:   4    Recommended Adaptive Equipment: LB dressing AE as needed for safe reach and energy conservation     Home Living: Pt lives alone; mother can assist upon discharge per pt report. Pt lives  in a 3 level condo with 4 step(s) to enter and no rail(s); bed/bath on 2nd floor: 8 steps/rail. Pt can stay on first floor if needed. Bathroom setup: tub/shower with tub transfer bench s/p knee surgery; 3in1 commode  Equipment owned: transfer bench, [address] [place] [address], [employer], no LB dressing AE    Prior Level of Function: Maggie with ADLs;  Maggie  with IADLs. WW PRN for ambulation.    Driving: yes  Occupation: Life     Pain Level: pt c/o 5/10 neck/head pain  this session    Cognition: A&O: 4/4    Follows 1-2 step commands appropriately with min cues for safety. Pt self reports hallucinations; memory concerns. Memory: fair   Comprehension fair   Problem solving: fair-   Judgement/safety: fair- Impulsive with transfers; cues to slow down for safety and fall prevention.                Communication skills: WFL           Vision: WFL  - reports no new vision changes            Glasses:contact lenses                                                   Hearing: WFL     RASS: -1  CAM-ICU: (NT) Delirium    UE Assessment:  Hand Dominance: Right [x]  Left []     ROM Strength   RUE  WFL 4/5   LUE WFL 4/5     Sensation: \"sporatic\" numbness / tingling in all extremities   Tone: WNL   Edema: min B feet   Functional Assessment:  AM-PAC Daily Activity Raw Score: 13/24   Initial Eval Status  Date: 9/30 Treatment Status  Date: STGs = LTGs  Time frame: 7-14 days   Feeding S; set up                        IND   while seated up in chair to increase activity tolerance        Grooming Min A                        Maggie   while standing sink level requiring no device for balance and demonstrating G tolerance      UB dressing/bathing Mod A                        Maggie       LB dressing/bathing Dep                        Maggie   using AE as needed for safe reach/ energy conservation       Toileting NT                        Maggie     Bed Mobility  Supine to sit: SBA with HOB elevated    Sit to supine: Min A                        Maggie  in prep of ADL tasks & transfers   Functional Transfers Sit to stand: Min A    Stand to sit: Min A                        Maggie  sit<>stand/functional bathroom transfers using AD/DME as needed for balance and safety   Functional Mobility Min A no device  LOB x 1                       Maggie   functional/bathroom mobility using AD as needed & demonstrating G safety     Balance Sitting: Static:  Min A EOB  (transferred to chair for back support. Pt not able to tolerate sitting in chair due to increased back pain. Pt reports she has not been able to take her home medications for days. Nurse notified. Dynamic:Min A  Standing: Min A  Maggie dynamic sitting balance; Maggie dynamic standing balance  during ADL tasks & transfers   Endurance/Activity Tolerance   F tolerance with light activity. G   tolerance with moderate activity/self care routine   Visual/  Perceptual   WFL                     Vitals:   HR at rest: 78 bpm HR at end of session: 75 bpm   Spo2 at rest:Not registering% Spo2 at end of session 96%   BP at rest:141/75 mmHg BP at end of session 116/80 mmHg       Treatment: OT treatment provided this date includes:  Bed mobility: Instruction on precautions prior to bed mobility to facilitate safe transfers and ADLS. HOB elevated to assist; pt used bed rails. Balance retraining: Performed sitting/standing balance ex's with instruction to facilitate righting reactions with postural changes during ADLS. Energy conservation: Education on breathing techniques, pacing, work simplification strategies & recommended bathroom DME for safety and energy conservation during self care tasks and activities of daily living. Delirium Prevention: Environmental and sensory modifications assessed and implemented to decrease ICU acquired delirium and to improve overall orientation, mentation and pt interaction with family/staff. Line management and environmental modifications made prior to and end of session to ensure patient safety and to increase efficiency of session. Skilled monitoring of HR, O2 saturation, blood pressure and patient's response to activity performed throughout session. Comments: OK from RN to see patient. Upon arrival, patient supine in bed, agreeable to session; pt lethargic initially feeling groggy with min confusion. Pt clearing during session.   Pt demo fair tolerance with fair understanding of education/techniques. At end of session, patient returned to bed & positioned sidelying for comfort with pillows for support. .  Call light within reach, all lines and tubes intact. Pt instructed on use of call light for assistance and fall prevention. .    Patient presents with decreased ROM/strength, activity tolerance, dynamic balance, functional mobility limiting completion of ADLs and safety. Pt can benefit from continued skilled OT to increase safety, functional independence and quality of life. Rehab Potential: good for established goals    Patient / Family Goal: to return to PLOF    Patient and/or family were instructed/educated on diagnosis, prognosis/goals and plan of care. Pt demonstrated F understanding. Evaluation Complexity: Moderate     · History: Expanded chart review of consults, imaging, and psychosocial history related to current functional performance. · Exam: 5+ performance deficits identified limiting functional independence and safe return home   · Assistance/Modification: Min/mod assistance or modifications required to perform tasks. May have comorbidities that affect occupational performance. [] Malnutrition indicators have been identified and nursing has been notified to ensure a dietitian consult is ordered. Time In:1325             Time Out: 1355         Total Treatment time: 15   Min Units   OT Eval Low 24861     OT Eval Medium 52071 X    OT Eval High 01993     OT Re-Eval [id number]     Therapeutic Ex 01525     Therapeutic Activities 46248 15 1   ADL/Self Care 42471     Orthotic Management 56199     Neuro Re-Ed 76332     Non-Billable Time        Evaluation time includes thorough review of current medical information, gathering information on past medical history/social history and prior level of function, completion of standardized testing/informal observation of tasks, assessment of data and development of POC/Goals.      Zakiya Petersen, OTR/L [address] [place] [address]

## 2021-09-30 NOTE — CARE COORDINATION
Remains in SICU Neurology followng for [facility]. Now with increased headaches and confusion this am upon awakening    Stat CT obtained without changes. Stat TCD ordered . Per APRN note No reported seizures,  Weaned off Cardene. No evidence of vasospasma per Transcranial doppler. Required 1 dose of antihypertensive agent. Received 2 doses of ativan for anxiety. For pain control: 5 doses of Fioricet &  3 doses of morphine. Awaiting bed @ .  Ambulance form and envelope in soft chart Electronically signed by Joan Sanchez RN on 9/30/2021 at 10:07 AM

## 2021-09-30 NOTE — PROGRESS NOTES
Surgical  Neuro Science Intensive Care Unit  Critical Care  Daily Progress Note 9/30/2021    Date of Admission: 09/27/2021    CC: Follow up for [facility]. HOSPITAL COURSE/OVERNIGHT EVENTS:    9/27  Patient presented secondary to general body aches found to have a subarachnoid hemorrhage secondary to intracranial aneurysm. Started on nicardipine drip to help control blood pressure. Patient developed acute symptomatic seizures secondary to brain bleed  9/28: Patient fell yesterday secondary to her knees giving out while getting up to go to the bathroom. Patient was redirected in told to lie in bed secondary to her subarachnoid bleed. Neurosurgery was asked to evaluate patient. We will continue Keppra. Patient's GCS is 14 -1 off her confusion today. 09/29  No issues overnight. Remains on Cardene. More alert this am.    09/30  No issues overnight. No reported seizures,  Weaned off Cardene. No evidence of vasospasma per Transcranial doppler. Required 1 dose of antihypertensive agent. Received 2 doses of ativan for anxiety. For pain control: 5 doses of Fioricet &  3 doses of morphine. cONFUSED THIS AM.  Stat HCT. Transcranial doppler study negative for vasospasm. Confusion due to med versus sleep apnea. [facility] PHYSICAL EXAM:   BP (!) 126/91   Pulse 69   Temp 98.4 °F (36.9 °C) (Axillary)   Resp 24   Ht 5' 8\" (1.727 m)   Wt (!) 394 lb 10 oz (179 kg)   SpO2 94%   BMI 60.00 kg/m²     Intake/Output Summary (Last 24 hours) at 9/30/2021 1149  Last data filed at 9/30/2021 1118  Gross per 24 hour   Intake 2114 ml   Output 3000 ml   Net -886 ml     General appearance:  Comfortable.    Pain Description: moderate and severe neck pain      NEUROLOGIC:   RASS Score:  0  GCS: 13  3 - Opens eyes to loud noise or command   6 - Follows simple motor commands  4 - Seems confused, disoriented    Pupil size:  Left 3 mm  Right 3 mm  Pupil reaction: Yes   PERRLA  Wiggles fingers: Left   Yes  Right Yes  Hand grasp: Left: Yes     Right    Yes  Wiggles toes: Left   Yes Right  Yes  Plantar flexion: Left  Yes  Right   Yes    CONSTITUTIONAL: No acute distress, lying in hospital bed. CARDIOVASCULAR: S1 S2, regular rate, regular rhythm, no murmur/gallop/rub. Monitor: NSR. PULMONARY: Bilaterally clear. No rhonchi/rales/wheezes, no use of accessory muscles. O2 at 2 L nc. RENAL: Voids. Female insentience device. Fluid balance for previous 24 hours:- 636 ml. ABDOMEN: Soft, nontender, nondistended, nontympanic, normal bowel sounds. Diet:  General.  No reported nausea or vomiting. MUSCULOSKELETAL:  Moves all extremities purposefully, 5/5 strength   SKIN/EXTREMITIES: No rashes/ecchymosis, no edema/clubbing, warm/dry, good capillary refill. LINES:  Peripheral     Recent Labs     09/28/21  0516 09/29/21  0500 09/30/21  0620   WBC 10.9 9.2 8.3   HGB 11.2* 10.5* 11.4*   HCT 34.5 32.4* 34.7   MCV 83.3 84.6 83.8    242 251       Recent Labs     09/29/21  0500 09/29/21  0500 09/29/21  1140 09/29/21  1530 09/30/21  0620      < > 136 136 136   K 3.6   < > 3.2* 3.3* 3.0*   CO2 22   < > 19* 27 24   PHOS 3.2  --   --   --  3.2   BUN 6   < > 6 6 8   CREATININE 0.6   < > 0.5 0.6 0.6    < > = values in this interval not displayed. Recent Labs     09/28/21  0516   PROT 7.0       ASSESSMENT/PLAN:     Principal Problem:    SAH (subarachnoid hemorrhage) (HCC)  Active Problems:    Bipolar disorder ([facility]    HTN (hypertension), benign    Mixed hyperlipidemia    Morbid obesity with BMI of 60.0-69.9, adult (HCC)    Lactic acid acidosis    Hypokalemia    Seizure ([facility]  Resolved Problems:    * No resolved hospital problems. *    Neuro:  SAH. Seizures. Hx of bipolar. Monitor neuro status. Neurosurgery following. Neurology following. Keppra for seizure prophylaxis. Ativan prn for seizures. Stroke education. Stroke protocol. Nimodipine for cerebral artery spasms. Daily Transcranial US.     Speech therapy CV:  Hypertensive emergency. Hx of HTN and Hyperlipidemia    Monitor hemodynamics. BP goal systolic less than 376 mm Hg. PRN hydralzine & labetalol. Statin. Cardene stopped. Coreg stopped. Pulm: No acute issues. Hx of sleep apnea. Monitor RR & SpO2. O2 as needed. Encourage cough, SMI  & deep breathing. Uses CPAP at night. Ask family to bring in her machine. GI: No acute issues. Morbid obesity. BMI 60. Hx of GERD & diverticulosis. I   Monitor bowel function. Diet: Regular. Bowel regime. Renal:  No acute issues. Monitor BUN & Cr, electrolytes & replace as needed. Monitor I & O.    Voids. ID: No acute issues  Endocrine: No acute issues. Monitor BS.    MSK: No acute issues.    ROM. Turn & reposition. PT & OT pending recommendations  Monitor for skin breakdown. Heme: No acute issues. Monitor CBC. Bowel regime: Glycolax Senna. Pain control/Sedation: Morphine IVP, Tylenol, Ativan and Fioricet  DVT prophylaxis: SCD and No Lovenox/Heparin at this time due to [facility]. GI prophylaxis: Diet  Ancillary consults:  Medicine. Neurosurgery. Neurology. Critical care. Patient/Family update: Will update when available. Code status: Full code. Disposition:  Continue ICU. Awaiting bed at [facility].       Electronically signed by Jakub Arreguin RN MSN APRN-NP [facility] NP  CCNS CCRN 9/30/2021 11:49 AM

## 2021-09-30 NOTE — PROGRESS NOTES
Physical Therapy    Physical Therapy Initial Assessment     Name: Heidy Reilly  : 1975  MRN: [id number]      Date of Service: 2021    Evaluating PT:  Mariluz Johnson, PT, DPT  [id number]     Room #:  4411/4393-Z  Diagnosis:  SAH (subarachnoid hemorrhage) ([facility] [I60.9]  PMHx/PSHx:  HLD, HTN, diverticulosis, GERD, knee pain, morbid obesity, sleep apnea, recent R knee replacement   Precautions:  Falls, SBP<140   Equipment Needs:  TBD    SUBJECTIVE:    Pt lives alone (with her dog and 2 cats) in a 2 story condo with 4 stairs to enter and 1 rail. Bedroom and bathroom are on the 2nd level but pt can stay on the 1st if needed. Pt ambulated with no AD PTA. Owns FWW d/t recent knee surgery. OBJECTIVE:   Initial Evaluation  Date: 21 Treatment Short Term/ Long Term   Goals   AM-PAC 6 Clicks      Was pt agreeable to Eval/treatment? Yes      Does pt have pain?  9/10 head and neck pain in AM;  5/10 head and neck pain in PM;  10/10 back pain with mobility      Bed Mobility  Rolling: SBA  Supine to sit: SBA  Sit to supine: SBA  Scooting: SBA  Modified Independent     Transfers Sit to stand: Min A  Stand to sit: Min A  Stand pivot: Min A  Modified Independent     Ambulation    5 feet x2 with no AD Min A  >100 feet with AAD Modified Independent     Stair negotiation: ascended and descended  NT  >4 steps with 1 rail Modified Independent     ROM BUE:  Per OT eval  BLE:  WFL     Strength BUE:  Per OT eval   BLE:  Grossly 3/5 -- MMT limited d/t back pain      Balance Sitting EOB:  SBA  Dynamic Standing:  Min A  Sitting EOB:  Independent   Dynamic Standing:  Modified Independent       Pt is A & O x 4  RASS:  0  CAM-ICU:  NT  Sensation:  Pt reports intermittent numbness and tingling to extremities  Edema:  Unremarkable     Vitals:  Blood Pressure at rest 128/72 mmHg  Blood Pressure post session 116/80 mmHg   Heart Rate at rest 74 bpm  Heart Rate post session 74 bpm    SPO2 at rest 96% on RA SPO2 post session 92% on RA     Functional Status Score-Intensive Care Unit (FSS-ICU)   Rolling 5/7   Supine to sit transfer 5/7   Unsupported sitting  5/7   Sit to stand transfers 4/7   Ambulation 1/7   Total  20/35     Therapeutic Exercises:    BLE ROM    Patient education  Pt educated on PT role, safety during functional mobility    Patient response to education:   Pt verbalized understanding Pt demonstrated skill Pt requires further education in this area   Yes  Yes  no     ASSESSMENT:    Conditions Requiring Skilled Therapeutic Intervention:    [x]Decreased strength     []Decreased ROM  [x]Decreased functional mobility  [x]Decreased balance   [x]Decreased endurance   []Decreased posture  []Decreased sensation  []Decreased coordination   []Decreased vision  []Decreased safety awareness   [x]Increased pain       Comments:  Pt received supine and agreeable to PT evaluation with OT collaboration. Pt cleared for participation by RN prior to session. Vitals monitored during session. Spoke with pt this AM and began evaluation but pt had STAT testing so PT had to return later in day. Pt limited by severe back pain, nausea, and dizziness. She was able to get to EOB using bed rail to pull herself up. Pt impulsively stood and transferred to recliner chair at bedside. She was a bit unsteady on feet. Reporting dizziness and nausea after transfer. Pt given basin and RN aware. Performed STS from chair to trial ambulation and pt yelling out in back pain. Returned to sitting and then assisted pt back to bed d/t ongoing pain. RN aware. Pt positioned in side lying on exit. Pt left with call button in reach, lines attached, and needs met.   Discussed pt case at interdisciplinary rounds in AM.     Treatment:  Patient practiced and was instructed in the following treatment:     Bed mobility training - pt given verbal and tactile cues to facilitate proper sequencing and safety during rolling and supine<>sit as well as provided with physical assistance to complete task     STS and pivot transfer training - pt educated on proper hand and foot placement, safety and sequencing, and use of no AD to safely complete sit<>stand and pivot transfers with hands on assistance to complete task safely      Pt's/ family goals   1. Decreased pain     Prognosis is fair for reaching above PT goals. Patient and or family understand(s) diagnosis, prognosis, and plan of care. Yes     PHYSICAL THERAPY PLAN OF CARE:    PT POC is established based on physician order and patient diagnosis     Referring provider/PT Order:    09/29/21 [id number]  PT eval and treat Start: 09/29/21 1015, End: 09/29/21 1015, ONE TIME, Standing Count: 1 Occurrences, R      HANG Martel - CNS       Diagnosis:  SAH (subarachnoid hemorrhage) ([email or web address] [I60.9]  Specific instructions for next treatment:  Progress gait and activity tolerance     Current Treatment Recommendations:     [x] Strengthening to improve independence with functional mobility   [] ROM to improve independence with functional mobility   [x] Balance Training to improve static/dynamic balance and to reduce fall risk  [x] Endurance Training to improve activity tolerance during functional mobility   [x] Transfer Training to improve safety and independence with all functional transfers   [x] Gait Training to improve gait mechanics, endurance and asses need for appropriate assistive device  [x] Stair Training in preparation for safe discharge home and/or into the community   [x] Positioning to prevent skin breakdown and contractures  [x] Safety and Education Training   [x] Patient/Caregiver Education   [x] HEP  [] Other     PT long term treatment goals are located in above grid    Frequency of treatments: 2-5x/week x 1-2 weeks.     Time in  1014  Time out  1028  Time in 1320  Time out 1355    Total Treatment Time  25 minutes     Evaluation Time includes thorough review of current medical information, gathering information on past medical history/social history and prior level of function, completion of standardized testing/informal observation of tasks, assessment of data and education on plan of care and goals.     CPT codes:  [] Low Complexity PT evaluation 71304  [x] Moderate Complexity PT evaluation 01287  [] High Complexity PT evaluation 29712  [] PT Re-evaluation 85079  [] Gait training 84470 -- minutes  [] Manual therapy 18763 -- minutes  [x] Therapeutic activities 79934 25 minutes  [] Therapeutic exercises 92276 -- minutes  [] Neuromuscular reeducation 93267 -- minutes     Holly Record, PT, DPT  [id number]

## 2021-09-30 NOTE — PLAN OF CARE
Problem: Falls - Risk of:  Goal: Will remain free from falls  9/30/2021 1621 by Jamil Guardado RN  Outcome: Met This Shift  9/30/2021 0906 by Nadia Carlin RN  Outcome: Met This Shift  Goal: Absence of physical injury  9/30/2021 0906 by Nadia Carlin RN  Outcome: Met This Shift     Problem: Pain:  Goal: Pain level will decrease  Outcome: Met This Shift     Problem: HEMODYNAMIC STATUS  Goal: Patient has stable vital signs and fluid balance  Outcome: Met This Shift     Problem: ACTIVITY INTOLERANCE/IMPAIRED MOBILITY  Goal: Mobility/activity is maintained at optimum level for patient  Outcome: Met This Shift     Problem: COMMUNICATION IMPAIRMENT  Goal: Ability to express needs and understand communication  Outcome: Met This Shift     Problem:  Bowel/Gastric:  Goal: Control of bowel function will improve  Outcome: Met This Shift     Problem: Nutritional:  Goal: Ability to follow a diet with enough fiber (20 to 30 grams) for normal bowel function will improve  Outcome: Met This Shift     Problem: Skin Integrity:  Goal: Risk for impaired skin integrity will decrease  Outcome: Met This Shift  Goal: Absence of new skin breakdown  Outcome: Met This Shift     Problem: Anxiety:  Goal: Level of anxiety will decrease  Outcome: Met This Shift

## 2021-09-30 NOTE — PROGRESS NOTES
Denilson Larson is a 48 y.o.  female     Neurology is following for subarachnoid hemorrhage. She presented after the onset of a severe headache. CT of the head showed extra-axial hyperdensity anterior to the brainstem in the inferior aspect of the kayla. CTA of the head and neck did not reveal any aneurysm. Diagnostic angio did not show any obvious aneurysm, but study was compromised due to patient's inability to cooperate. Vitals have been stable. She was intermittently hypertensive yesterday. Now with increased headaches and confusion this am upon awakening    Stat CT obtained without changes   Stat TCD ordered   [facility] remains 2     She is awaiting a bed at [facility]. Medically, she has a history of hypertension, hyperlipidemia, sleep apnea    No family at bedside    Review of systems otherwise negative    Allergies   Allergen Reactions    No Known Allergies      Objective:     /87   Pulse 72   Temp 98.2 °F (36.8 °C) (Axillary)   Resp 18   Ht 5' 8\" (1.727 m)   Wt (!) 394 lb 10 oz (179 kg)   SpO2 99%   BMI 60.00 kg/m²     General: Patient appears in uncomfortable and in moderate distress  HEENT: Normocephalic, atraumatic  Chest: Clear to auscultation bilaterally  Heart: Regular rate and rhythm, no murmurs appreciated  Extremities: No edema or cyanosis noted    Mental Status: Alert.  Oriented to self, place and year for me     Speech: clear   Language: No aphasia     Cranial Nerves:  I: smell    II: visual acuity     II: visual fields Full to confrontation   II: pupils KAITLYNN - no anisocoria    III,VII: ptosis None   III,IV,VI: extraocular muscles  Full ROM   V: mastication Normal   V: facial light touch sensation  Normal   V,VII: corneal reflex     VII: facial muscle function - upper  Normal   VII: facial muscle function - lower Normal   VIII: hearing Normal   IX: soft palate elevation  Normal   IX,X: gag reflex    XI: trapezius strength  5/5   XI: sternocleidomastoid strength 5/5   XI: neck extension strength  5/5   XII: tongue strength  Normal     Motor:  Moving all limbs symmetrically   Appears to be 5/5   Normal bulk     Sensory:  Appreciates LT in all limbs     Coordination:   No ataxia appreciated     DTR:   No Babinskis  left Castañeda's    Laboratory/Radiology:     CBC with Differential:    Lab Results   Component Value Date    WBC 8.3 09/30/2021    RBC 4.14 09/30/2021    HGB 11.4 09/30/2021    HCT 34.7 09/30/2021     09/30/2021    MCV 83.8 09/30/2021    MCH 27.5 09/30/2021    MCHC 32.9 09/30/2021    RDW 13.0 09/30/2021    LYMPHOPCT 15.5 09/28/2021    MONOPCT 8.9 09/28/2021    BASOPCT 0.6 09/28/2021    MONOSABS 0.97 09/28/2021    LYMPHSABS 1.68 09/28/2021    EOSABS 0.35 09/28/2021    BASOSABS 0.07 09/28/2021     CMP:    Lab Results   Component Value Date     09/30/2021    K 3.0 09/30/2021    K 3.8 06/22/2020    CL 98 09/30/2021    CO2 24 09/30/2021    BUN 8 09/30/2021    CREATININE 0.6 09/30/2021    GFRAA >60 09/30/2021    LABGLOM >60 09/30/2021    GLUCOSE 128 09/30/2021    PROT 7.0 09/28/2021    LABALBU 4.6 09/28/2021    CALCIUM 8.8 09/30/2021    BILITOT 0.8 09/28/2021    ALKPHOS 113 09/28/2021    AST 37 09/28/2021    ALT 38 09/28/2021     CT Head: 9/30/21  No acute intracranial abnormality. Cerebral Angiogram   1. Angiogram demonstrates no gross abnormality. Severely compromised  evaluation of the posterior fossa. Repeat study is suggested when the  patient is able cooperate in 7-14 days. Alternatively this study could  be repeated under general anesthesia. CT head  Small amount of acute subarachnoid hemorrhage along the anterior brainstem is  similar to the prior study.  Questionable small amount of scattered acute  subarachnoid hemorrhage in the sulci of both cerebral hemispheres, also  similar to the prior study.  No definite new intracranial hemorrhage.     No mass effect or midline shift.     I personally reviewed all labs and images today   Assessment:     Atraumatic subarachnoid hemorrhage suspected 2/2 non imaged intracranial aneurysm   White and Kim grade remains 2     Alteration in mentation this am possibly from ESEQUIEL?     Must r/o vasospasm     Plan:     Stat TCDs    awaiting bed at [address]  10:26 AM  9/30/2021

## 2021-09-30 NOTE — PROGRESS NOTES
Chief Complaint:  Chief Complaint   Patient presents with    Generalized Body Aches     Pain from head to legs, states tingling in legs. Knee replacement right side in July. Took \" a whole bunch of Oxy 2 nights ago plus drank alcohol\"  Pt diaphoretic upon EMS arrival     [address] (subarachnoid hemorrhage) (HCC)     Subjective:    Complains of headache. No photo/phonophobia. No weakness/numbness    Objective:    BP (!) 149/76   Pulse 77   Temp 98.4 °F (36.9 °C) (Axillary)   Resp 22   Ht 5' 8\" (1.727 m)   Wt (!) 394 lb 10 oz (179 kg)   SpO2 94%   BMI 60.00 kg/m²     Current medications that patient is taking have been reviewed. General appearance: NAD, morbidly obese  HEENT: AT/NC, MMM  Neck: FROM, supple  Lungs: Clear to auscultation anteriorly, WOB normal  CV: RRR, no MRGs  Abdomen: Soft, non-tender; no masses or HSM, +BS  Extremities: No peripheral edema or digital cyanosis  Skin: no rash, lesions or ulcers  Psych: Calm and cooperative  Neuro: Alert and interactive, face symmetric, EOMI, handgrips equal b/l, foot plantar/dorsiflexors equal b/l, though effort poor throughout.       Labs:  CBC with Differential:    Lab Results   Component Value Date    WBC 8.3 09/30/2021    RBC 4.14 09/30/2021    HGB 11.4 09/30/2021    HCT 34.7 09/30/2021     09/30/2021    MCV 83.8 09/30/2021    MCH 27.5 09/30/2021    MCHC 32.9 09/30/2021    RDW 13.0 09/30/2021    LYMPHOPCT 15.5 09/28/2021    MONOPCT 8.9 09/28/2021    BASOPCT 0.6 09/28/2021    MONOSABS 0.97 09/28/2021    LYMPHSABS 1.68 09/28/2021    EOSABS 0.35 09/28/2021    BASOSABS 0.07 09/28/2021     CMP:    Lab Results   Component Value Date     09/30/2021    K 3.0 09/30/2021    K 3.8 06/22/2020    CL 98 09/30/2021    CO2 24 09/30/2021    BUN 8 09/30/2021    CREATININE 0.6 09/30/2021    GFRAA >60 09/30/2021    LABGLOM >60 09/30/2021    GLUCOSE 128 09/30/2021    PROT 7.0 09/28/2021    LABALBU 4.6 09/28/2021    CALCIUM 8.8 09/30/2021    BILITOT 0.8 09/28/2021 ALKPHOS 113 09/28/2021    AST 37 09/28/2021    ALT 38 09/28/2021          Assessment/Plan:  Principal Problem:    SAH (subarachnoid hemorrhage) (HCC)  Active Problems:    Bipolar disorder ([facility] [address]    HTN (hypertension), benign    Mixed hyperlipidemia    Morbid obesity with BMI of 60.0-69.9, adult (HCC)    Lactic acid acidosis    Hypokalemia    Seizure ([facility] [address]  Resolved Problems:    * No resolved hospital problems. *       BP much better    Off nicardipine    Continue PO nimodipine    Patient accepted at [address]    Low dose Ativan PRN agitation; high dose PRN seizure.     Requires continued inpatient level of care     Nati Levy MD    7:55 PM  9/30/2021

## 2021-09-30 NOTE — FLOWSHEET NOTE
Pt.'s father brought in home cpap. Placed on counter in patients room. Will ensure it transfers with patient.

## 2021-09-30 NOTE — PROGRESS NOTES
Department of Neurosurgery  Progress Note    CHIEF COMPLAINT: SAH    SUBJECTIVE:  No acute events overnight. Patient woke up this morning with increased HA and confusion. Stat CT Head done, stable. TCD was also ordered. Upon examination patient complains of HA and states it is not improving. Denies any new complaints. CT Head reviewed    REVIEW OF SYSTEMS :  Constitutional: Negative for chills and fever. Neurological: Negative for dizziness and tremors, (+) HA and neck pain      OBJECTIVE:   VITALS:  /87   Pulse 72   Temp 98.2 °F (36.8 °C) (Axillary)   Resp 18   Ht 5' 8\" (1.727 m)   Wt (!) 394 lb 10 oz (179 kg)   SpO2 99%   BMI 60.00 kg/m²     PHYSICAL:  Alert, oriented  Appears stated age  PERRL  EOMI  Strength full  Sensation intact to light touch  (-) pronator drift.      DATA:  CBC:   Lab Results   Component Value Date    WBC 8.3 09/30/2021    RBC 4.14 09/30/2021    HGB 11.4 09/30/2021    HCT 34.7 09/30/2021    MCV 83.8 09/30/2021    MCH 27.5 09/30/2021    MCHC 32.9 09/30/2021    RDW 13.0 09/30/2021     09/30/2021    MPV 10.1 09/30/2021     BMP:    Lab Results   Component Value Date     09/30/2021    K 3.0 09/30/2021    K 3.8 06/22/2020    CL 98 09/30/2021    CO2 24 09/30/2021    BUN 8 09/30/2021    LABALBU 4.6 09/28/2021    CREATININE 0.6 09/30/2021    CALCIUM 8.8 09/30/2021    GFRAA >60 09/30/2021    LABGLOM >60 09/30/2021    GLUCOSE 128 09/30/2021     PT/INR:    Lab Results   Component Value Date    PROTIME 11.7 09/27/2021    INR 1.1 09/27/2021     PTT:    Lab Results   Component Value Date    APTT 21.2 09/27/2021   [APTT}    Current Inpatient Medications  Current Facility-Administered Medications: butalbital-acetaminophen-caffeine (FIORICET, ESGIC) per tablet 1 tablet, 1 tablet, Oral, Q4H PRN  levETIRAcetam (KEPPRA) tablet 1,000 mg, 1,000 mg, Oral, BID  niMODipine (NIMOTOP) capsule 60 mg, 60 mg, Oral, 6 times per day  labetalol (NORMODYNE;TRANDATE) injection 10 mg, 10 mg, IntraVENous, Q10 Min PRN  hydrALAZINE (APRESOLINE) injection 10 mg, 10 mg, IntraVENous, Q10 Min PRN  polyethylene glycol (GLYCOLAX) packet 17 g, 17 g, Oral, Daily  sennosides-docusate sodium (SENOKOT-S) 8.6-50 MG tablet 2 tablet, 2 tablet, Oral, Daily  LORazepam (ATIVAN) tablet 0.5 mg, 0.5 mg, Oral, Q4H PRN  sodium chloride flush 0.9 % injection 5-40 mL, 5-40 mL, IntraVENous, 2 times per day  sodium chloride flush 0.9 % injection 5-40 mL, 5-40 mL, IntraVENous, PRN  0.9 % sodium chloride infusion, 25 mL, IntraVENous, PRN  LORazepam (ATIVAN) injection 2 mg, 2 mg, IntraVENous, Q4H PRN  morphine (PF) injection 2 mg, 2 mg, IntraVENous, Q4H PRN    ASSESSMENT:   - Rissa Oliva 47 y/o female who LP (+) for SAH secondary to non-imaged intracranial aneurysm. PLAN:  -Patient will need to be transferred to unit facility with neuro ICU capabilities- Patient accepted at [facility] awaiting bed.     -Nimodipine for vasospasm prevention.   -Keep SBP <140   -Continue Keppra  -No AP/AC or NSAID medications  -Will need repeat angio in 7-14 days from original.        Electronically signed by VIOLA Scruggs on 9/30/2021 at 10:49 AM

## 2021-10-01 ENCOUNTER — APPOINTMENT (OUTPATIENT)
Dept: NEUROLOGY | Age: 46
DRG: 065 | End: 2021-10-01
Payer: COMMERCIAL

## 2021-10-01 ENCOUNTER — APPOINTMENT (OUTPATIENT)
Dept: GENERAL RADIOLOGY | Age: 46
DRG: 065 | End: 2021-10-01
Payer: COMMERCIAL

## 2021-10-01 ENCOUNTER — APPOINTMENT (OUTPATIENT)
Dept: ULTRASOUND IMAGING | Age: 46
DRG: 065 | End: 2021-10-01
Payer: COMMERCIAL

## 2021-10-01 LAB
ANION GAP SERPL CALCULATED.3IONS-SCNC: 13 MMOL/L (ref 7–16)
BUN BLDV-MCNC: 9 MG/DL (ref 6–20)
CALCIUM IONIZED: 1.16 MMOL/L (ref 1.15–1.33)
CALCIUM SERPL-MCNC: 8.7 MG/DL (ref 8.6–10.2)
CHLORIDE BLD-SCNC: 101 MMOL/L (ref 98–107)
CO2: 21 MMOL/L (ref 22–29)
CREAT SERPL-MCNC: 0.8 MG/DL (ref 0.5–1)
GFR AFRICAN AMERICAN: >60
GFR NON-AFRICAN AMERICAN: >60 ML/MIN/1.73
GLUCOSE BLD-MCNC: 115 MG/DL (ref 74–99)
HCT VFR BLD CALC: 34.4 % (ref 34–48)
HEMOGLOBIN: 11.3 G/DL (ref 11.5–15.5)
MAGNESIUM: 2.1 MG/DL (ref 1.6–2.6)
MCH RBC QN AUTO: 27.5 PG (ref 26–35)
MCHC RBC AUTO-ENTMCNC: 32.8 % (ref 32–34.5)
MCV RBC AUTO: 83.7 FL (ref 80–99.9)
PDW BLD-RTO: 13.5 FL (ref 11.5–15)
PHOSPHORUS: 3.6 MG/DL (ref 2.5–4.5)
PLATELET # BLD: 245 E9/L (ref 130–450)
PMV BLD AUTO: 10.6 FL (ref 7–12)
POTASSIUM SERPL-SCNC: 3.5 MMOL/L (ref 3.5–5)
RBC # BLD: 4.11 E12/L (ref 3.5–5.5)
SODIUM BLD-SCNC: 135 MMOL/L (ref 132–146)
WBC # BLD: 9 E9/L (ref 4.5–11.5)

## 2021-10-01 PROCEDURE — 36415 COLL VENOUS BLD VENIPUNCTURE: CPT

## 2021-10-01 PROCEDURE — 6360000002 HC RX W HCPCS: Performed by: INTERNAL MEDICINE

## 2021-10-01 PROCEDURE — 6370000000 HC RX 637 (ALT 250 FOR IP): Performed by: STUDENT IN AN ORGANIZED HEALTH CARE EDUCATION/TRAINING PROGRAM

## 2021-10-01 PROCEDURE — 95816 EEG AWAKE AND DROWSY: CPT | Performed by: PSYCHIATRY & NEUROLOGY

## 2021-10-01 PROCEDURE — 95816 EEG AWAKE AND DROWSY: CPT

## 2021-10-01 PROCEDURE — 2000000000 HC ICU R&B

## 2021-10-01 PROCEDURE — 84100 ASSAY OF PHOSPHORUS: CPT

## 2021-10-01 PROCEDURE — 6360000002 HC RX W HCPCS: Performed by: NURSE PRACTITIONER

## 2021-10-01 PROCEDURE — 6370000000 HC RX 637 (ALT 250 FOR IP): Performed by: NURSE PRACTITIONER

## 2021-10-01 PROCEDURE — 93886 INTRACRANIAL COMPLETE STUDY: CPT

## 2021-10-01 PROCEDURE — 99291 CRITICAL CARE FIRST HOUR: CPT | Performed by: SURGERY

## 2021-10-01 PROCEDURE — 82330 ASSAY OF CALCIUM: CPT

## 2021-10-01 PROCEDURE — 80048 BASIC METABOLIC PNL TOTAL CA: CPT

## 2021-10-01 PROCEDURE — 6370000000 HC RX 637 (ALT 250 FOR IP): Performed by: NEUROLOGICAL SURGERY

## 2021-10-01 PROCEDURE — 93886 INTRACRANIAL COMPLETE STUDY: CPT | Performed by: RADIOLOGY

## 2021-10-01 PROCEDURE — 99232 SBSQ HOSP IP/OBS MODERATE 35: CPT | Performed by: CLINICAL NURSE SPECIALIST

## 2021-10-01 PROCEDURE — 6370000000 HC RX 637 (ALT 250 FOR IP): Performed by: SURGERY

## 2021-10-01 PROCEDURE — 2500000003 HC RX 250 WO HCPCS: Performed by: NURSE PRACTITIONER

## 2021-10-01 PROCEDURE — 72100 X-RAY EXAM L-S SPINE 2/3 VWS: CPT

## 2021-10-01 PROCEDURE — 6360000002 HC RX W HCPCS: Performed by: NEUROLOGICAL SURGERY

## 2021-10-01 PROCEDURE — 83735 ASSAY OF MAGNESIUM: CPT

## 2021-10-01 PROCEDURE — 85027 COMPLETE CBC AUTOMATED: CPT

## 2021-10-01 PROCEDURE — 2580000003 HC RX 258: Performed by: EMERGENCY MEDICINE

## 2021-10-01 PROCEDURE — 72070 X-RAY EXAM THORAC SPINE 2VWS: CPT

## 2021-10-01 RX ORDER — HEPARIN SODIUM 10000 [USP'U]/ML
5000 INJECTION, SOLUTION INTRAVENOUS; SUBCUTANEOUS EVERY 8 HOURS
Status: DISCONTINUED | OUTPATIENT
Start: 2021-10-01 | End: 2021-10-06 | Stop reason: HOSPADM

## 2021-10-01 RX ORDER — METHOCARBAMOL 750 MG/1
1500 TABLET, FILM COATED ORAL 4 TIMES DAILY PRN
Status: DISCONTINUED | OUTPATIENT
Start: 2021-10-01 | End: 2021-10-06 | Stop reason: HOSPADM

## 2021-10-01 RX ORDER — OXYCODONE HYDROCHLORIDE 5 MG/1
5 TABLET ORAL EVERY 4 HOURS PRN
Status: DISCONTINUED | OUTPATIENT
Start: 2021-10-01 | End: 2021-10-04

## 2021-10-01 RX ORDER — LISINOPRIL 10 MG/1
5 TABLET ORAL DAILY
Status: DISCONTINUED | OUTPATIENT
Start: 2021-10-01 | End: 2021-10-02

## 2021-10-01 RX ADMIN — OXYCODONE 5 MG: 5 TABLET ORAL at 21:37

## 2021-10-01 RX ADMIN — NIMODIPINE 60 MG: 30 CAPSULE, LIQUID FILLED ORAL at 04:15

## 2021-10-01 RX ADMIN — POTASSIUM CHLORIDE 20 MEQ: 20 TABLET, EXTENDED RELEASE ORAL at 08:15

## 2021-10-01 RX ADMIN — NIMODIPINE 60 MG: 30 CAPSULE, LIQUID FILLED ORAL at 20:06

## 2021-10-01 RX ADMIN — CITROMA MAGNESIUM CITRATE 296 ML: 1.75 LIQUID ORAL at 08:14

## 2021-10-01 RX ADMIN — LABETALOL HYDROCHLORIDE 10 MG: 5 INJECTION, SOLUTION INTRAVENOUS at 22:34

## 2021-10-01 RX ADMIN — Medication 5 ML: at 08:30

## 2021-10-01 RX ADMIN — BUTALBITAL, ACETAMINOPHEN, AND CAFFEINE 1 TABLET: 50; 325; 40 TABLET ORAL at 22:42

## 2021-10-01 RX ADMIN — HEPARIN SODIUM 5000 UNITS: 10000 INJECTION INTRAVENOUS; SUBCUTANEOUS at 13:32

## 2021-10-01 RX ADMIN — METHOCARBAMOL TABLETS 1500 MG: 750 TABLET, COATED ORAL at 08:14

## 2021-10-01 RX ADMIN — POTASSIUM CHLORIDE 20 MEQ: 20 TABLET, EXTENDED RELEASE ORAL at 13:32

## 2021-10-01 RX ADMIN — LABETALOL HYDROCHLORIDE 10 MG: 5 INJECTION, SOLUTION INTRAVENOUS at 12:03

## 2021-10-01 RX ADMIN — MORPHINE SULFATE 2 MG: 2 INJECTION, SOLUTION INTRAMUSCULAR; INTRAVENOUS at 02:27

## 2021-10-01 RX ADMIN — LISINOPRIL 5 MG: 10 TABLET ORAL at 10:24

## 2021-10-01 RX ADMIN — NIMODIPINE 60 MG: 30 CAPSULE, LIQUID FILLED ORAL at 17:34

## 2021-10-01 RX ADMIN — NIMODIPINE 60 MG: 30 CAPSULE, LIQUID FILLED ORAL at 12:03

## 2021-10-01 RX ADMIN — LABETALOL HYDROCHLORIDE 10 MG: 5 INJECTION, SOLUTION INTRAVENOUS at 09:22

## 2021-10-01 RX ADMIN — MORPHINE SULFATE 2 MG: 2 INJECTION, SOLUTION INTRAMUSCULAR; INTRAVENOUS at 07:00

## 2021-10-01 RX ADMIN — LABETALOL HYDROCHLORIDE 10 MG: 5 INJECTION, SOLUTION INTRAVENOUS at 02:24

## 2021-10-01 RX ADMIN — HEPARIN SODIUM 5000 UNITS: 10000 INJECTION INTRAVENOUS; SUBCUTANEOUS at 21:45

## 2021-10-01 RX ADMIN — Medication 10 ML: at 20:09

## 2021-10-01 RX ADMIN — GABAPENTIN 600 MG: 300 CAPSULE ORAL at 08:15

## 2021-10-01 RX ADMIN — METHOCARBAMOL TABLETS 1500 MG: 750 TABLET, COATED ORAL at 20:36

## 2021-10-01 RX ADMIN — NIMODIPINE 60 MG: 30 CAPSULE, LIQUID FILLED ORAL at 00:20

## 2021-10-01 RX ADMIN — LEVETIRACETAM 1000 MG: 500 TABLET, FILM COATED ORAL at 08:15

## 2021-10-01 RX ADMIN — GABAPENTIN 600 MG: 300 CAPSULE ORAL at 13:32

## 2021-10-01 RX ADMIN — LEVETIRACETAM 1000 MG: 500 TABLET, FILM COATED ORAL at 20:32

## 2021-10-01 RX ADMIN — POLYETHYLENE GLYCOL 3350 17 G: 17 POWDER, FOR SOLUTION ORAL at 08:15

## 2021-10-01 RX ADMIN — POTASSIUM CHLORIDE 20 MEQ: 20 TABLET, EXTENDED RELEASE ORAL at 20:08

## 2021-10-01 RX ADMIN — GABAPENTIN 600 MG: 300 CAPSULE ORAL at 20:32

## 2021-10-01 RX ADMIN — DOCUSATE SODIUM 50 MG AND SENNOSIDES 8.6 MG 2 TABLET: 8.6; 5 TABLET, FILM COATED ORAL at 08:15

## 2021-10-01 RX ADMIN — OXYCODONE 5 MG: 5 TABLET ORAL at 17:37

## 2021-10-01 RX ADMIN — HYDROMORPHONE HYDROCHLORIDE 1 MG: 1 INJECTION, SOLUTION INTRAMUSCULAR; INTRAVENOUS; SUBCUTANEOUS at 12:03

## 2021-10-01 RX ADMIN — NIMODIPINE 60 MG: 30 CAPSULE, LIQUID FILLED ORAL at 08:15

## 2021-10-01 ASSESSMENT — PAIN DESCRIPTION - ORIENTATION
ORIENTATION: POSTERIOR
ORIENTATION: POSTERIOR

## 2021-10-01 ASSESSMENT — PAIN DESCRIPTION - DESCRIPTORS
DESCRIPTORS: HEADACHE
DESCRIPTORS: ACHING;DISCOMFORT

## 2021-10-01 ASSESSMENT — PAIN DESCRIPTION - PAIN TYPE
TYPE: ACUTE PAIN
TYPE: ACUTE PAIN

## 2021-10-01 ASSESSMENT — PAIN - FUNCTIONAL ASSESSMENT
PAIN_FUNCTIONAL_ASSESSMENT: PREVENTS OR INTERFERES WITH MANY ACTIVE NOT PASSIVE ACTIVITIES
PAIN_FUNCTIONAL_ASSESSMENT: PREVENTS OR INTERFERES SOME ACTIVE ACTIVITIES AND ADLS

## 2021-10-01 ASSESSMENT — PAIN DESCRIPTION - ONSET
ONSET: PROGRESSIVE
ONSET: PROGRESSIVE

## 2021-10-01 ASSESSMENT — PAIN SCALES - GENERAL
PAINLEVEL_OUTOF10: 10
PAINLEVEL_OUTOF10: 6
PAINLEVEL_OUTOF10: 8
PAINLEVEL_OUTOF10: 0
PAINLEVEL_OUTOF10: 8
PAINLEVEL_OUTOF10: 8
PAINLEVEL_OUTOF10: 10
PAINLEVEL_OUTOF10: 0

## 2021-10-01 ASSESSMENT — PAIN DESCRIPTION - FREQUENCY
FREQUENCY: CONTINUOUS
FREQUENCY: CONTINUOUS

## 2021-10-01 ASSESSMENT — PAIN DESCRIPTION - LOCATION
LOCATION: HEAD
LOCATION: NECK

## 2021-10-01 ASSESSMENT — PAIN DESCRIPTION - PROGRESSION: CLINICAL_PROGRESSION: GRADUALLY WORSENING

## 2021-10-01 NOTE — PROGRESS NOTES
[facility] totally closed to outside ICU transfers  [facility] but will not render any decision on transfers as they are also full; they will consider transfers at their \"next meeting\"    I think we need to stick with [facility] at this time.

## 2021-10-01 NOTE — PROCEDURES
Juliomelina Report    MRN: [id number]   PATIENT NAME: Denilson Larson   DATE OF REPORT: 10/1/2021    DATE OF SERVICE: 10/1/2021    PHYSICIAN NAME: Opal Nieto DO  Referring Physician: Dora Colón      Patient's : 1975   Patient's Age: 48 y.o. Gender: female     PROCEDURE: Routine EEG with video      Clinical Interpretation: This was a normal study during waking and drowsiness. No seizures or epileptiform discharges were noted during this study. ____________________________  Electronically signed by: Opal Nieto DO, 10/1/2021 12:13 PM      Patient Clinical Information   Reason for Study: Patient undergoing evaluation for possible seizure  Patient State: Awake  Primary neurological diagnosis: Spell of uncertain etiology   Primary indication for monitoring: Characterization of spells    Pertinent Medications and Treatments    Hydromorphone    butalbital-acetaminophen-caffeine     levetiracetam    Lorazepam    Sedatives administered: No  Intubated: No  Pharmacological paralytic: No    Reporting Period  Start of Study: 1152, 10/1/2021   End of Study:  1217, 10/1/2021       EEG Description  Digital video and scalp EEG monitoring was performed using the standard protocol for this laboratory. Scalp electrodes were applied in the international 10/20 system. Multiple digital montage arrangements were utilized for evaluation. EKG and video were recorded. Background:      Occipital rhythm (posterior dominant rhythm or PDR): Present   Frequency: 9 Hz  Voltage: Low   Organization: fair   Reactivity to eye opening/closure: minimal    Drowsiness: Present - normal  Sleep: Absent    Technical and Activation Procedures:  Hyperventilation: Not done        Photic stimulation: Not done        Reactivity to stimulation: Yes    Abnormalities:    I. Seizures? No    II. Rhythmic or Periodic Patterns? No    III. Other Abnormalities? No

## 2021-10-01 NOTE — PROGRESS NOTES
Full ROM   V: mastication Normal   V: facial light touch sensation  Normal   V,VII: corneal reflex     VII: facial muscle function - upper  Normal   VII: facial muscle function - lower Normal   VIII: hearing Normal   IX: soft palate elevation  Normal   IX,X: gag reflex    XI: trapezius strength  5/5   XI: sternocleidomastoid strength 5/5   XI: neck extension strength  5/5   XII: tongue strength  Normal     Motor:  Moving all limbs symmetrically   Appears to be 5/5   Normal bulk     Sensory:  Appreciates LT in all limbs     Coordination:   No ataxia appreciated     DTR:   No Babinskis  left Castañeda's    Laboratory/Radiology:     CBC with Differential:    Lab Results   Component Value Date    WBC 9.0 10/01/2021    RBC 4.11 10/01/2021    HGB 11.3 10/01/2021    HCT 34.4 10/01/2021     10/01/2021    MCV 83.7 10/01/2021    MCH 27.5 10/01/2021    MCHC 32.8 10/01/2021    RDW 13.5 10/01/2021    LYMPHOPCT 15.5 09/28/2021    MONOPCT 8.9 09/28/2021    BASOPCT 0.6 09/28/2021    MONOSABS 0.97 09/28/2021    LYMPHSABS 1.68 09/28/2021    EOSABS 0.35 09/28/2021    BASOSABS 0.07 09/28/2021     CMP:    Lab Results   Component Value Date     10/01/2021    K 3.5 10/01/2021    K 3.8 06/22/2020     10/01/2021    CO2 21 10/01/2021    BUN 9 10/01/2021    CREATININE 0.8 10/01/2021    GFRAA >60 10/01/2021    LABGLOM >60 10/01/2021    GLUCOSE 115 10/01/2021    PROT 7.0 09/28/2021    LABALBU 4.6 09/28/2021    CALCIUM 8.7 10/01/2021    BILITOT 0.8 09/28/2021    ALKPHOS 113 09/28/2021    AST 37 09/28/2021    ALT 38 09/28/2021     TCDs 9/30/21  Unremarkable study, no evidence for vasospasm    CT Head: 9/30/21  No acute intracranial abnormality. Cerebral Angiogram   1. Angiogram demonstrates no gross abnormality. Severely compromised  evaluation of the posterior fossa. Repeat study is suggested when the  patient is able cooperate in 7-14 days. Alternatively this study could  be repeated under general anesthesia.      Assessment: Atraumatic subarachnoid hemorrhage suspected 2/2 non imaged intracranial aneurysm   White and Kim grade remains 2     Alteration in mentation yesterday morning possibly from ESEQUIEL? TCD's and CT were stable    ?  Seizure -- will get EEG     Plan:     Continue to monitor TCDs    EEG this am     awaiting bed at [facility]  9:35 AM  10/1/2021

## 2021-10-01 NOTE — PROGRESS NOTES
Physical Therapy    Pt on PT treatment caseload. Pt declined PT services this afternoon. States she is going for testing soon. Will check back as able.       Emily Ruiz, PT, DPT  [id number]

## 2021-10-01 NOTE — PROGRESS NOTES
OCCUPATIONAL THERAPY    Date:10/1/2021  Patient Name: Chester Silva  MRN: [id number]  : 1975  Room: [facility]              Chart reviewed. Pt declined session/OOB activity this pm reporting she is leaving for a test soon. Pt cleared for activity per nursing. Will re-attempt at later time. Thank you for consult.     Marcos Dove, OTR/L [id number]

## 2021-10-01 NOTE — CARE COORDINATION
Remains in SICU- Per NS PA note-SAH secondary to non-imaged intracranial aneurysm. Patient states she had another seizure last night. Currently complains of neck and back pain and is requesting a cervical collar for comfort. Patient will need to be transferred to unit facility with neuro ICU capabilities- Patient accepted at [facility] awaiting bed. Ordered soft cervical collar for comfort. XR of lumbar and thoracic spine Nimodipine for vasospasm prevention. Keep SBP <140  Continue Keppra. Call placed to access center- spoke to [facility] to check on bed availability for . Still no bed available. Per access center  If her condition worsens to call back. CM/SW to follow. .Electronically signed by Urbano Cook RN on 10/1/2021 at 9:22 AM

## 2021-10-01 NOTE — PROGRESS NOTES
Neuro Science Intensive Care Unit  Critical Care  Daily Progress Note 10/1/2021    Date of Admission: 9/27/2021    CC:  \"I've never had a headache this long. \"  Follow up for [address]: 9/27  Patient presented secondary to general body aches found to have a subarachnoid hemorrhage secondary to intracranial aneurysm.  Started on nicardipine drip to help control blood pressure.  Patient developed acute symptomatic seizures secondary to brain bleed    9/28: Patient fell yesterday secondary to her knees giving out while getting up to go to the bathroom.  Patient was redirected in told to lie in bed secondary to her subarachnoid bleed.  Neurosurgery was asked to evaluate patient.  We will continue Keppra.  Patient's GCS is 14 -1 off her confusion today. 09/29  No issues overnight. Remains on Cardene. More alert this am.      09/30  No issues overnight. No reported seizures,  Weaned off Cardene. No evidence of vasospasma per Transcranial doppler. Required 1 dose of antihypertensive agent. Received 2 doses of ativan for anxiety. For pain control: 5 doses of Fioricet &  3 doses of morphine. cONFUSED THIS AM.  Stat HCT. Transcranial doppler study negative for vasospasm. Confusion due to med versus sleep apnea. 10/01 Reports having a seizure last night. BP remains fluctuating off Cardene drip. Daily TCD remains negative for vasospasms. Required several doses of IV labetaolol for BP control. Received multiple doses of Fioricet for pain control. Home CPAP used last night. PHYSICAL EXAM:    /72   Pulse 79   Temp 98.4 °F (36.9 °C) (Axillary)   Resp 25   Ht 5' 8\" (1.727 m)   Wt (!) 394 lb 10 oz (179 kg)   SpO2 97%   BMI 60.00 kg/m²     Intake/Output Summary (Last 24 hours) at 10/1/2021 1201  Last data filed at 10/1/2021 0900  Gross per 24 hour   Intake 1400 ml   Output 2750 ml   Net -1350 ml         General appearance:  Comfortable.  Reports neck and back pain along with frontal headache      NEUROLOGIC:        GCS:    4 - Opens eyes on own   6 - Follows simple motor commands  5 - Alert and oriented       Pupil size:  Left 3 mm  Right 3 mm  Pupil reaction: Yes   PERRLA  Wiggles fingers: Left Yes Right Yes  Hand grasp:   Left present     Right present  Wiggles toes: Left Yes    Right Yes  Plantar flexion: Left present    Right present  Speech:  Clear      CONSTITUTIONAL: No acute distress, lying in hospital bed  CARDIOVASCULAR: S1 S2, regular rate, regular rhythm, no murmur, gallop or rub Monitor: NSR  PULMONARY:  Respirations unlabored. No rhonchi/rales/wheezes. Bilateral clear, on room air  RENAL:  voids   ABDOMEN: Soft, nontender, nondistended, nontympanic, normal bowel sounds. General diet. Reports no n/v/d   MUSCULOSKELETAL: Moves all extremities purposefully, 5/5 strength   SKIN/EXTREMITIES: No rashes/ecchymosis, no edema/clubbing, warm/dry, good capillary refill. IV ACCESS:   peripheral      ASSESSMENT/PLAN:     Principal Problem:    SAH (subarachnoid hemorrhage) ([facility])  Active Problems:    Bipolar disorder ([facility] [address]    HTN (hypertension), benign    Mixed hyperlipidemia    Morbid obesity with BMI of 60.0-69.9, adult ([facility])    Lactic acid acidosis    Hypokalemia    Seizure ([facility])  Resolved Problems:    * No resolved hospital problems. *          Neuro:  SAH. Seizures. Hx of bipolar. Monitor neuro status. Neurosurgery following. Neurology following. Keppra for seizure prophylaxis. Ativan prn for seizures. Stroke education. Stroke protocol. Nimodipine for cerebral artery spasms. Daily Transcranial US. Speech therapy   CV: Hypertensive emergency. Hx of HTN and Hyperlipidemia              Monitor hemodynamics. BP goal systolic less than 364 mm Hg. PRN hydralzine & labetalol. Statin. Started on  lisinopril    Pulm: No acute issues. Hx of sleep apnea. Monitor RR & SpO2. O2 as needed.    Currently on room air  Encourage YANCY noriega [facility]  & deep breathing. Home CPAP at bedside-reports wearing it last night  GI: No acute issues. Hx of GERD & diverticulosis. I   Monitor bowel function. Diet: Regular. Renal:  No acute issues. Monitor BUN & Cr, electrolytes & replace as needed. Monitor I & O.    ID:  No acute issues  Endocrine:    No acute issues  MSK:. No acute issues.    ROM. PT & OT    Monitor for skin breakdown. OOB  Heme: no acute issues    Monitor CBC        Bowel regime: glycolax & senna  Pain control/Sedation:  Morphine IVP, Tylenol, Ativan and Fioricet, add IV Dilaudid 0.5-1mg q4h prn  DVT prophylaxis: SCDs. No Lovenox/Heparin at this time due to [facility]  GI prophylaxis: On Regular diet   Consults:   Medicine, Neurosurgery, Neurology, Critical Care  Patient/Family update: Will update when available  Code status:  Full Code      Disposition:  Continue ICU. Waiting for bed to become available at [facility]. Jaqui Ospina DNP.  HANG-CNP  10/1/2021  7:46 AM

## 2021-10-01 NOTE — PLAN OF CARE
Problem: Falls - Risk of:  Goal: Will remain free from falls  Description: Will remain free from falls  10/1/2021 1031 by Ant Groves RN  Outcome: Met This Shift  10/1/2021 0152 by Donya Pollock RN  Outcome: Met This Shift  Goal: Absence of physical injury  Description: Absence of physical injury  10/1/2021 1031 by Ant Groves RN  Outcome: Met This Shift  10/1/2021 0152 by Donya Pollock RN  Outcome: Met This Shift     Problem: Pain:  Goal: Pain level will decrease  Description: Pain level will decrease  Outcome: Met This Shift  Goal: Control of acute pain  Description: Control of acute pain  Outcome: Met This Shift  Goal: Control of chronic pain  Description: Control of chronic pain  Outcome: Met This Shift     Problem: HEMODYNAMIC STATUS  Goal: Patient has stable vital signs and fluid balance  Outcome: Met This Shift     Problem: ACTIVITY INTOLERANCE/IMPAIRED MOBILITY  Goal: Mobility/activity is maintained at optimum level for patient  Outcome: Met This Shift     Problem: COMMUNICATION IMPAIRMENT  Goal: Ability to express needs and understand communication  Outcome: Met This Shift     Problem:  Bowel/Gastric:  Goal: Control of bowel function will improve  Description: Control of bowel function will improve  Outcome: Met This Shift  Goal: Ability to achieve a regular elimination pattern will improve  Description: Ability to achieve a regular elimination pattern will improve  Outcome: Met This Shift     Problem: Nutritional:  Goal: Ability to follow a diet with enough fiber (20 to 30 grams) for normal bowel function will improve  Description: Ability to follow a diet with enough fiber (20 to 30 grams) for normal bowel function will improve  Outcome: Met This Shift     Problem: Skin Integrity:  Goal: Risk for impaired skin integrity will decrease  Description: Risk for impaired skin integrity will decrease  Outcome: Met This Shift  Goal: Will show no infection signs and symptoms  Description: Will show no infection signs and symptoms  Outcome: Met This Shift  Goal: Absence of new skin breakdown  Description: Absence of new skin breakdown  Outcome: Met This Shift     Problem: Anxiety:  Goal: Level of anxiety will decrease  Description: Level of anxiety will decrease  Outcome: Met This Shift

## 2021-10-01 NOTE — PROGRESS NOTES
Chief Complaint:  Chief Complaint   Patient presents with    Generalized Body Aches     Pain from head to legs, states tingling in legs. Knee replacement right side in July. Took \" a whole bunch of Oxy 2 nights ago plus drank alcohol\"  Pt diaphoretic upon EMS arrival     [facility] (subarachnoid hemorrhage) (HCC)     Subjective:    Complains of headache. Not responding to the morphine 2 mg. I note that dilaudid 1 mg is also available. She hasn't tried that yet. Complains of photo/phonophobia. No new weakness/numbness    Objective:    /75   Pulse 71   Temp 98.2 °F (36.8 °C) (Axillary)   Resp 23   Ht 5' 8\" (1.727 m)   Wt (!) 394 lb 10 oz (179 kg)   SpO2 98%   BMI 60.00 kg/m²     Current medications that patient is taking have been reviewed.     General appearance: NAD, morbidly obese  HEENT: AT/NC, MMM  Neck: FROM, supple  Lungs: Clear to auscultation anteriorly, WOB normal  CV: RRR, no MRGs  Abdomen: Soft, non-tender; no masses or HSM, +BS  Extremities: No peripheral edema or digital cyanosis  Skin: no rash, lesions or ulcers  Psych: Calm and cooperative  Neuro: Alert and interactive, face symmetric, EOMI, handgrips equal b/l, foot plantar/dorsiflexors equal b/l, effort stronger today    Labs:  CBC with Differential:    Lab Results   Component Value Date    WBC 9.0 10/01/2021    RBC 4.11 10/01/2021    HGB 11.3 10/01/2021    HCT 34.4 10/01/2021     10/01/2021    MCV 83.7 10/01/2021    MCH 27.5 10/01/2021    MCHC 32.8 10/01/2021    RDW 13.5 10/01/2021    LYMPHOPCT 15.5 09/28/2021    MONOPCT 8.9 09/28/2021    BASOPCT 0.6 09/28/2021    MONOSABS 0.97 09/28/2021    LYMPHSABS 1.68 09/28/2021    EOSABS 0.35 09/28/2021    BASOSABS 0.07 09/28/2021     CMP:    Lab Results   Component Value Date     10/01/2021    K 3.5 10/01/2021    K 3.8 06/22/2020     10/01/2021    CO2 21 10/01/2021    BUN 9 10/01/2021    CREATININE 0.8 10/01/2021    GFRAA >60 10/01/2021    LABGLOM >60 10/01/2021    GLUCOSE 115 10/01/2021    PROT 7.0 09/28/2021    LABALBU 4.6 09/28/2021    CALCIUM 8.7 10/01/2021    BILITOT 0.8 09/28/2021    ALKPHOS 113 09/28/2021    AST 37 09/28/2021    ALT 38 09/28/2021          Assessment/Plan:  Principal Problem:    SAH (subarachnoid hemorrhage) ([facility])  Active Problems:    Bipolar disorder ([facility] [address]    HTN (hypertension), benign    Mixed hyperlipidemia    Morbid obesity with BMI of 60.0-69.9, adult (HCC)    Lactic acid acidosis    Hypokalemia    Seizure ([facility] [address]  Resolved Problems:    * No resolved hospital problems. *       BP creeping up again    Lisinopril added    Off nicardipine    Continue PO nimodipine    Patient accepted at [facility] is aware we have already investigated [facility] and [facility] and they were not taking ICU admissions as of ~2 days ago, but they have grown impatient and are asking us to check again. I asked [address] Yang to investigate this. Continue Keppra    Low dose Ativan PRN agitation; high dose PRN seizure.     Requires continued inpatient level of care     Caroline Mccartney MD    4:01 PM  10/1/2021

## 2021-10-01 NOTE — PROGRESS NOTES
Department of Neurosurgery  Progress Note    CHIEF COMPLAINT: SAH    SUBJECTIVE:  Patient states she had another seizure last night. Currently complains of neck and back pain and is requesting a cervical collar for comfort. Patient states she has not had a BM since Saturday. No weakness or numbness. REVIEW OF SYSTEMS :  Constitutional: Negative for chills and fever. Neurological: Negative for dizziness and tremors, (+) HA and neck pain      OBJECTIVE:   VITALS:  /72   Pulse 79   Temp 98.4 °F (36.9 °C) (Axillary)   Resp 25   Ht 5' 8\" (1.727 m)   Wt (!) 394 lb 10 oz (179 kg)   SpO2 97%   BMI 60.00 kg/m²     PHYSICAL:  Alert, oriented  Appears stated age  PERRL  EOMI  Strength full  Sensation intact to light touch  (-) pronator drift. TTP along lumbar spine.      DATA:  CBC:   Lab Results   Component Value Date    WBC 9.0 10/01/2021    RBC 4.11 10/01/2021    HGB 11.3 10/01/2021    HCT 34.4 10/01/2021    MCV 83.7 10/01/2021    MCH 27.5 10/01/2021    MCHC 32.8 10/01/2021    RDW 13.5 10/01/2021     10/01/2021    MPV 10.6 10/01/2021     BMP:    Lab Results   Component Value Date     10/01/2021    K 3.5 10/01/2021    K 3.8 06/22/2020     10/01/2021    CO2 21 10/01/2021    BUN 9 10/01/2021    LABALBU 4.6 09/28/2021    CREATININE 0.8 10/01/2021    CALCIUM 8.7 10/01/2021    GFRAA >60 10/01/2021    LABGLOM >60 10/01/2021    GLUCOSE 115 10/01/2021     PT/INR:    Lab Results   Component Value Date    PROTIME 11.7 09/27/2021    INR 1.1 09/27/2021     PTT:    Lab Results   Component Value Date    APTT 21.2 09/27/2021   [APTT}    Current Inpatient Medications  Current Facility-Administered Medications: methocarbamol (ROBAXIN) tablet 1,500 mg, 1,500 mg, Oral, 4x Daily PRN  heparin (porcine) injection 5,000 Units, 5,000 Units, SubCUTAneous, Q8H  potassium chloride (KLOR-CON M) extended release tablet 20 mEq, 20 mEq, Oral, TID  gabapentin (NEURONTIN) capsule 600 mg, 600 mg, Oral, TID  trimethobenzamide (TIGAN) injection 200 mg, 200 mg, IntraMUSCular, Q6H PRN  butalbital-acetaminophen-caffeine (FIORICET, ESGIC) per tablet 1 tablet, 1 tablet, Oral, Q4H PRN  levETIRAcetam (KEPPRA) tablet 1,000 mg, 1,000 mg, Oral, BID  niMODipine (NIMOTOP) capsule 60 mg, 60 mg, Oral, 6 times per day  labetalol (NORMODYNE;TRANDATE) injection 10 mg, 10 mg, IntraVENous, Q10 Min PRN  hydrALAZINE (APRESOLINE) injection 10 mg, 10 mg, IntraVENous, Q10 Min PRN  polyethylene glycol (GLYCOLAX) packet 17 g, 17 g, Oral, Daily  sennosides-docusate sodium (SENOKOT-S) 8.6-50 MG tablet 2 tablet, 2 tablet, Oral, Daily  LORazepam (ATIVAN) tablet 0.5 mg, 0.5 mg, Oral, Q4H PRN  sodium chloride flush 0.9 % injection 5-40 mL, 5-40 mL, IntraVENous, 2 times per day  sodium chloride flush 0.9 % injection 5-40 mL, 5-40 mL, IntraVENous, PRN  0.9 % sodium chloride infusion, 25 mL, IntraVENous, PRN  LORazepam (ATIVAN) injection 2 mg, 2 mg, IntraVENous, Q4H PRN  morphine (PF) injection 2 mg, 2 mg, IntraVENous, Q4H PRN    ASSESSMENT:   - Elen Oliva 47 y/o female who LP (+) for SAH secondary to non-imaged intracranial aneurysm. PLAN:  -Patient will need to be transferred to unit facility with neuro ICU capabilities- Patient [facility] bed.    -Ordered soft cervical collar for comfort  -XR of lumbar and thoracic spine   -Nimodipine for vasospasm prevention.   -Keep SBP <140   -Continue Keppra  -No AP/AC or NSAID medications  -Will need repeat angio in 7-14 days from original.        Electronically signed by Tonya Caruso, [id number] on 10/1/2021 at 8:33 AM     Nsx Attending:    Patient was seen and examined by me with the team.  I personally reviewed all pertinent radiological images. I concur with Miss Lee's clinical assessment and plan. Pr's nuchal rigidity improved and with anticipated lower t/L pain. Continue current care: add bowel regimen and OK for Heparin Sq as px.  D/w trauma sx      Thank you so much for allowing us

## 2021-10-02 ENCOUNTER — APPOINTMENT (OUTPATIENT)
Dept: ULTRASOUND IMAGING | Age: 46
DRG: 065 | End: 2021-10-02
Payer: COMMERCIAL

## 2021-10-02 LAB
ANION GAP SERPL CALCULATED.3IONS-SCNC: 12 MMOL/L (ref 7–16)
BLOOD CULTURE, ROUTINE: NORMAL
BUN BLDV-MCNC: 12 MG/DL (ref 6–20)
CALCIUM IONIZED: 1.2 MMOL/L (ref 1.15–1.33)
CALCIUM SERPL-MCNC: 8.9 MG/DL (ref 8.6–10.2)
CHLORIDE BLD-SCNC: 98 MMOL/L (ref 98–107)
CO2: 24 MMOL/L (ref 22–29)
CREAT SERPL-MCNC: 0.7 MG/DL (ref 0.5–1)
GFR AFRICAN AMERICAN: >60
GFR NON-AFRICAN AMERICAN: >60 ML/MIN/1.73
GLUCOSE BLD-MCNC: 104 MG/DL (ref 74–99)
HCT VFR BLD CALC: 35.5 % (ref 34–48)
HEMOGLOBIN: 11.5 G/DL (ref 11.5–15.5)
HERPES SIMPLEX VIRUS BY PCR: NOT DETECTED
HSV SOURCE: NORMAL
MAGNESIUM: 2.5 MG/DL (ref 1.6–2.6)
MCH RBC QN AUTO: 27.4 PG (ref 26–35)
MCHC RBC AUTO-ENTMCNC: 32.4 % (ref 32–34.5)
MCV RBC AUTO: 84.7 FL (ref 80–99.9)
PDW BLD-RTO: 13.5 FL (ref 11.5–15)
PHOSPHORUS: 3.5 MG/DL (ref 2.5–4.5)
PLATELET # BLD: 243 E9/L (ref 130–450)
PMV BLD AUTO: 10.2 FL (ref 7–12)
POTASSIUM SERPL-SCNC: 3.9 MMOL/L (ref 3.5–5)
RBC # BLD: 4.19 E12/L (ref 3.5–5.5)
SODIUM BLD-SCNC: 134 MMOL/L (ref 132–146)
WBC # BLD: 9.6 E9/L (ref 4.5–11.5)

## 2021-10-02 PROCEDURE — 84100 ASSAY OF PHOSPHORUS: CPT

## 2021-10-02 PROCEDURE — 2500000003 HC RX 250 WO HCPCS: Performed by: NURSE PRACTITIONER

## 2021-10-02 PROCEDURE — 83735 ASSAY OF MAGNESIUM: CPT

## 2021-10-02 PROCEDURE — 93886 INTRACRANIAL COMPLETE STUDY: CPT

## 2021-10-02 PROCEDURE — 6360000002 HC RX W HCPCS: Performed by: NEUROLOGICAL SURGERY

## 2021-10-02 PROCEDURE — 6370000000 HC RX 637 (ALT 250 FOR IP): Performed by: SURGERY

## 2021-10-02 PROCEDURE — 99232 SBSQ HOSP IP/OBS MODERATE 35: CPT | Performed by: NEUROLOGICAL SURGERY

## 2021-10-02 PROCEDURE — 99291 CRITICAL CARE FIRST HOUR: CPT | Performed by: SURGERY

## 2021-10-02 PROCEDURE — 6370000000 HC RX 637 (ALT 250 FOR IP): Performed by: NEUROLOGICAL SURGERY

## 2021-10-02 PROCEDURE — 80048 BASIC METABOLIC PNL TOTAL CA: CPT

## 2021-10-02 PROCEDURE — 82330 ASSAY OF CALCIUM: CPT

## 2021-10-02 PROCEDURE — 85027 COMPLETE CBC AUTOMATED: CPT

## 2021-10-02 PROCEDURE — 36415 COLL VENOUS BLD VENIPUNCTURE: CPT

## 2021-10-02 PROCEDURE — 6370000000 HC RX 637 (ALT 250 FOR IP): Performed by: STUDENT IN AN ORGANIZED HEALTH CARE EDUCATION/TRAINING PROGRAM

## 2021-10-02 PROCEDURE — 6370000000 HC RX 637 (ALT 250 FOR IP): Performed by: NURSE PRACTITIONER

## 2021-10-02 PROCEDURE — 2580000003 HC RX 258: Performed by: EMERGENCY MEDICINE

## 2021-10-02 PROCEDURE — 93886 INTRACRANIAL COMPLETE STUDY: CPT | Performed by: RADIOLOGY

## 2021-10-02 PROCEDURE — 2000000000 HC ICU R&B

## 2021-10-02 PROCEDURE — 6360000002 HC RX W HCPCS: Performed by: NURSE PRACTITIONER

## 2021-10-02 RX ORDER — CARVEDILOL 6.25 MG/1
6.25 TABLET ORAL 2 TIMES DAILY WITH MEALS
Status: DISCONTINUED | OUTPATIENT
Start: 2021-10-02 | End: 2021-10-03

## 2021-10-02 RX ORDER — CLONIDINE HYDROCHLORIDE 0.1 MG/1
0.1 TABLET ORAL 3 TIMES DAILY
Status: DISCONTINUED | OUTPATIENT
Start: 2021-10-02 | End: 2021-10-02

## 2021-10-02 RX ORDER — CLONIDINE HYDROCHLORIDE 0.2 MG/1
0.2 TABLET ORAL 3 TIMES DAILY
Status: DISCONTINUED | OUTPATIENT
Start: 2021-10-02 | End: 2021-10-03

## 2021-10-02 RX ORDER — CLONIDINE HYDROCHLORIDE 0.1 MG/1
0.1 TABLET ORAL ONCE
Status: COMPLETED | OUTPATIENT
Start: 2021-10-02 | End: 2021-10-02

## 2021-10-02 RX ADMIN — GABAPENTIN 600 MG: 300 CAPSULE ORAL at 21:30

## 2021-10-02 RX ADMIN — CLONIDINE HYDROCHLORIDE 0.2 MG: 0.2 TABLET ORAL at 21:46

## 2021-10-02 RX ADMIN — Medication 10 ML: at 08:02

## 2021-10-02 RX ADMIN — CLONIDINE HYDROCHLORIDE 0.1 MG: 0.1 TABLET ORAL at 08:01

## 2021-10-02 RX ADMIN — POTASSIUM CHLORIDE 20 MEQ: 20 TABLET, EXTENDED RELEASE ORAL at 08:02

## 2021-10-02 RX ADMIN — OXYCODONE 5 MG: 5 TABLET ORAL at 08:01

## 2021-10-02 RX ADMIN — TRIMETHOBENZAMIDE HYDROCHLORIDE 200 MG: 100 INJECTION INTRAMUSCULAR at 05:44

## 2021-10-02 RX ADMIN — GABAPENTIN 600 MG: 300 CAPSULE ORAL at 09:00

## 2021-10-02 RX ADMIN — POLYETHYLENE GLYCOL 3350 17 G: 17 POWDER, FOR SOLUTION ORAL at 08:02

## 2021-10-02 RX ADMIN — POTASSIUM CHLORIDE 20 MEQ: 20 TABLET, EXTENDED RELEASE ORAL at 21:47

## 2021-10-02 RX ADMIN — DOCUSATE SODIUM 50 MG AND SENNOSIDES 8.6 MG 2 TABLET: 8.6; 5 TABLET, FILM COATED ORAL at 08:02

## 2021-10-02 RX ADMIN — NIMODIPINE 60 MG: 30 CAPSULE, LIQUID FILLED ORAL at 19:50

## 2021-10-02 RX ADMIN — HEPARIN SODIUM 5000 UNITS: 10000 INJECTION INTRAVENOUS; SUBCUTANEOUS at 13:57

## 2021-10-02 RX ADMIN — LISINOPRIL 5 MG: 10 TABLET ORAL at 08:02

## 2021-10-02 RX ADMIN — LABETALOL HYDROCHLORIDE 10 MG: 5 INJECTION, SOLUTION INTRAVENOUS at 19:01

## 2021-10-02 RX ADMIN — LABETALOL HYDROCHLORIDE 10 MG: 5 INJECTION, SOLUTION INTRAVENOUS at 15:29

## 2021-10-02 RX ADMIN — NIMODIPINE 60 MG: 30 CAPSULE, LIQUID FILLED ORAL at 00:19

## 2021-10-02 RX ADMIN — LEVETIRACETAM 1000 MG: 500 TABLET, FILM COATED ORAL at 21:30

## 2021-10-02 RX ADMIN — NIMODIPINE 60 MG: 30 CAPSULE, LIQUID FILLED ORAL at 03:50

## 2021-10-02 RX ADMIN — CLONIDINE HYDROCHLORIDE 0.1 MG: 0.1 TABLET ORAL at 13:57

## 2021-10-02 RX ADMIN — GABAPENTIN 600 MG: 300 CAPSULE ORAL at 13:57

## 2021-10-02 RX ADMIN — NIMODIPINE 60 MG: 30 CAPSULE, LIQUID FILLED ORAL at 12:53

## 2021-10-02 RX ADMIN — OXYCODONE 5 MG: 5 TABLET ORAL at 02:44

## 2021-10-02 RX ADMIN — LEVETIRACETAM 1000 MG: 500 TABLET, FILM COATED ORAL at 08:30

## 2021-10-02 RX ADMIN — CLONIDINE HYDROCHLORIDE 0.1 MG: 0.1 TABLET ORAL at 13:20

## 2021-10-02 RX ADMIN — METHOCARBAMOL TABLETS 1500 MG: 750 TABLET, COATED ORAL at 16:06

## 2021-10-02 RX ADMIN — CARVEDILOL 6.25 MG: 6.25 TABLET, FILM COATED ORAL at 16:53

## 2021-10-02 RX ADMIN — LABETALOL HYDROCHLORIDE 10 MG: 5 INJECTION, SOLUTION INTRAVENOUS at 16:53

## 2021-10-02 RX ADMIN — METHOCARBAMOL TABLETS 1500 MG: 750 TABLET, COATED ORAL at 10:19

## 2021-10-02 RX ADMIN — Medication 10 ML: at 21:47

## 2021-10-02 RX ADMIN — LABETALOL HYDROCHLORIDE 10 MG: 5 INJECTION, SOLUTION INTRAVENOUS at 13:47

## 2021-10-02 RX ADMIN — OXYCODONE 5 MG: 5 TABLET ORAL at 19:02

## 2021-10-02 RX ADMIN — NIMODIPINE 60 MG: 30 CAPSULE, LIQUID FILLED ORAL at 15:31

## 2021-10-02 RX ADMIN — POTASSIUM CHLORIDE 20 MEQ: 20 TABLET, EXTENDED RELEASE ORAL at 14:00

## 2021-10-02 RX ADMIN — HEPARIN SODIUM 5000 UNITS: 10000 INJECTION INTRAVENOUS; SUBCUTANEOUS at 05:59

## 2021-10-02 RX ADMIN — HEPARIN SODIUM 5000 UNITS: 10000 INJECTION INTRAVENOUS; SUBCUTANEOUS at 21:58

## 2021-10-02 RX ADMIN — NIMODIPINE 60 MG: 30 CAPSULE, LIQUID FILLED ORAL at 08:01

## 2021-10-02 RX ADMIN — METHOCARBAMOL TABLETS 1500 MG: 750 TABLET, COATED ORAL at 05:13

## 2021-10-02 RX ADMIN — METHOCARBAMOL TABLETS 1500 MG: 750 TABLET, COATED ORAL at 21:54

## 2021-10-02 ASSESSMENT — PAIN DESCRIPTION - ONSET
ONSET: ON-GOING

## 2021-10-02 ASSESSMENT — PAIN SCALES - GENERAL
PAINLEVEL_OUTOF10: 8
PAINLEVEL_OUTOF10: 2
PAINLEVEL_OUTOF10: 2
PAINLEVEL_OUTOF10: 0
PAINLEVEL_OUTOF10: 3
PAINLEVEL_OUTOF10: 3
PAINLEVEL_OUTOF10: 8
PAINLEVEL_OUTOF10: 4
PAINLEVEL_OUTOF10: 7

## 2021-10-02 ASSESSMENT — PAIN DESCRIPTION - PROGRESSION
CLINICAL_PROGRESSION: NOT CHANGED

## 2021-10-02 ASSESSMENT — PAIN DESCRIPTION - LOCATION
LOCATION: BACK
LOCATION: HEAD
LOCATION: HEAD;BACK
LOCATION: HEAD

## 2021-10-02 ASSESSMENT — PAIN DESCRIPTION - DESCRIPTORS
DESCRIPTORS: HEADACHE
DESCRIPTORS: SHARP;SPASM
DESCRIPTORS: ACHING;DISCOMFORT

## 2021-10-02 ASSESSMENT — PAIN DESCRIPTION - FREQUENCY
FREQUENCY: CONTINUOUS
FREQUENCY: CONTINUOUS
FREQUENCY: INTERMITTENT
FREQUENCY: CONTINUOUS

## 2021-10-02 ASSESSMENT — PAIN DESCRIPTION - PAIN TYPE
TYPE: ACUTE PAIN

## 2021-10-02 ASSESSMENT — PAIN DESCRIPTION - ORIENTATION
ORIENTATION: POSTERIOR
ORIENTATION: LOWER
ORIENTATION: ANTERIOR

## 2021-10-02 ASSESSMENT — PAIN - FUNCTIONAL ASSESSMENT
PAIN_FUNCTIONAL_ASSESSMENT: PREVENTS OR INTERFERES SOME ACTIVE ACTIVITIES AND ADLS
PAIN_FUNCTIONAL_ASSESSMENT: PREVENTS OR INTERFERES WITH MANY ACTIVE NOT PASSIVE ACTIVITIES

## 2021-10-02 NOTE — PROGRESS NOTES
Neuro Science Intensive Care Unit  Critical Care  Daily Progress Note 10/2/2021    Date of Admission: 9/27/2021    CC:  \"I've never had a headache this long. \"  Follow up for [address] Jacqueline Arenas: 9/27  Patient presented secondary to general body aches found to have a subarachnoid hemorrhage secondary to intracranial aneurysm.  Started on nicardipine drip to help control blood pressure.  Patient developed acute symptomatic seizures secondary to brain bleed    9/28: Patient fell yesterday secondary to her knees giving out while getting up to go to the bathroom.  Patient was redirected in told to lie in bed secondary to her subarachnoid bleed.  Neurosurgery was asked to evaluate patient.  We will continue Keppra.  Patient's GCS is 14 -1 off her confusion today. 09/29  No issues overnight. Remains on Cardene. More alert this am.      09/30  No issues overnight. No reported seizures,  Weaned off Cardene. No evidence of vasospasma per Transcranial doppler. Required 1 dose of antihypertensive agent. Received 2 doses of ativan for anxiety. For pain control: 5 doses of Fioricet &  3 doses of morphine. cONFUSED THIS AM.  Stat HCT. Transcranial doppler study negative for vasospasm. Confusion due to med versus sleep apnea. 10/01 Reports having a seizure last night. BP remains fluctuating off Cardene drip. Daily TCD remains negative for vasospasms. Required several doses of IV labetaolol for BP control. Received multiple doses of Fioricet for pain control. Home CPAP used last night. PHYSICAL EXAM:    BP (!) 146/86   Pulse 70   Temp 98.2 °F (36.8 °C)   Resp 18   Ht 5' 8\" (1.727 m)   Wt (!) 394 lb 10 oz (179 kg)   SpO2 95%   BMI 60.00 kg/m²     Intake/Output Summary (Last 24 hours) at 10/2/2021 0743  Last data filed at 10/2/2021 0600  Gross per 24 hour   Intake 1100 ml   Output 1901 ml   Net -801 ml         General appearance:  Comfortable.  Reports neck and back pain along with frontal headache      NEUROLOGIC:        GCS:    4 - Opens eyes on own   6 - Follows simple motor commands  5 - Alert and oriented       Pupil size:  Left 3 mm  Right 3 mm  Pupil reaction: Yes   PERRLA  Wiggles fingers: Left Yes Right Yes  Hand grasp:   Left present     Right present  Wiggles toes: Left Yes    Right Yes  Plantar flexion: Left present    Right present  Speech:  Clear      CONSTITUTIONAL: No acute distress, lying in hospital bed  CARDIOVASCULAR: S1 S2, regular rate, regular rhythm, no murmur, gallop or rub Monitor: NSR  PULMONARY:  Respirations unlabored. No rhonchi/rales/wheezes. Bilateral clear, on room air  RENAL:  voids   ABDOMEN: Soft, nontender, nondistended, nontympanic, normal bowel sounds. General diet. Reports no n/v/d   MUSCULOSKELETAL: Moves all extremities purposefully, 5/5 strength   SKIN/EXTREMITIES: No rashes/ecchymosis, no edema/clubbing, warm/dry, good capillary refill. IV ACCESS:   peripheral      ASSESSMENT/PLAN:     Principal Problem:    SAH (subarachnoid hemorrhage) ([facility])  Active Problems:    Bipolar disorder ([facility] [address]    HTN (hypertension), benign    Mixed hyperlipidemia    Morbid obesity with BMI of 60.0-69.9, adult ([facility])    Lactic acid acidosis    Hypokalemia    Seizure ([facility])  Resolved Problems:    * No resolved hospital problems. *      Neuro:  SAH. Seizures. Hx of bipolar. Monitor neuro status. Neurosurgery following. [facility]rology [facility]. [facility] Stroke [facility]. Nimodipine for cerebral artery spasms. Daily Transcranial US neg for vasospasms. CT clear. Speech therapy EEG - no seizures. CV: Hypertensive emergency. Hx of HTN and Hyperlipidemia. Monitor hemodynamics. BP goal systolic less than 021 mm Hg. PRN hydralzine & labetalol. Statin. Lisinopril. Start clonidine. Pulm: No acute issues. Hx of sleep apnea. Monitor RR & SpO2. Home CPAP at bedside-reports wearing it last night  GI: No acute issues. Hx of GERD & diverticulosis. Tolerating diet. Monitor bowel function. Renal:  No acute issues. Monitor BUN & Cr, electrolytes & replace as needed. Monitor I & O.    ID:  No acute issues  Endocrine:    No acute issues  MSK:. No acute issues. PT & OT  OOB  Heme: no acute issues. Monitor CBC        Bowel regime: glycolax & senna  Pain control/Sedation:  P, Tylenol, Ativan and Fioricet,   DVT prophylaxis: SCDs. Heparin   GI prophylaxis: On Regular diet   Consults:   Medicine, Neurosurgery, Neurology, Critical Care  Patient/Family update: Will update when available  Code status:  Full Code      Disposition:  Continue ICU. Waiting for bed to become available at [facility]. Vijaya Sue DNP.  APRN-CNP  10/2/2021  7:43 AM

## 2021-10-02 NOTE — PROGRESS NOTES
Comprehensive Nutrition Assessment    Type and Reason for Visit:  Initial (ICU LOS)    Nutrition Recommendations/Plan: Continue current diet, Start Ensure HP BID    Nutrition Assessment:  Pt admit w/ SAH and noted seizures. Pt s/p angio. Noted >75% PO intakes at this time. Will add ONS to better meet estimated nutrient needs and will monitor. Malnutrition Assessment:  Malnutrition Status:  No malnutrition    Context:  Acute Illness     Findings of the 6 clinical characteristics of malnutrition:  Energy Intake:  No significant decrease in energy intake  Weight Loss:  Unable to assess     Body Fat Loss:  No significant body fat loss     Muscle Mass Loss:  No significant muscle mass loss    Fluid Accumulation:  No significant fluid accumulation     Strength:  Not Performed    Estimated Daily Nutrient Needs:  Energy (kcal):  MSJ 2466 x 80% x 1.1 SF = 4453-0429; Weight Used for Energy Requirements:  Current     Protein (g):  130-160; Weight Used for Protein Requirements:  Ideal (2-2.5)        Fluid (ml/day):  per critical care; Method Used for Fluid Requirements:         Nutrition Related Findings:  intermittent confusion, active BS, abd distention, constipation, +2 edema, -I/Os      Wounds:  None       Current Nutrition Therapies:    ADULT DIET;  Regular    Anthropometric Measures:  · Height: 5' 8\" (172.7 cm)  · Current Body Weight: 394 lb (178.7 kg) (9/28 actual)   · Admission Body Weight: 394 lb (178.7 kg) (first actual)    · Usual Body Weight:  (UTO, no wt hx on file)     · Ideal Body Weight: 140 lbs; % Ideal Body Weight 281.4 %   · BMI: 59.9  · BMI Categories: Obese Class 3 (BMI 40.0 or greater)       Nutrition Diagnosis:   No nutrition diagnosis at this time     Nutrition Interventions:   Food and/or Nutrient Delivery:  Continue Current Diet, Start Oral Nutrition Supplement (Ensure HP BID)  Nutrition Education/Counseling:  Education not indicated   Coordination of Nutrition Care:  Continue to monitor while inpatient    Goals:  pt to consume >75% meals/ONS       Nutrition Monitoring and Evaluation:   Food/Nutrient Intake Outcomes:  Food and Nutrient Intake, Supplement Intake  Physical Signs/Symptoms Outcomes:  Biochemical Data, GI Status, Fluid Status or Edema, Nutrition Focused Physical Findings, Skin, Weight     Discharge Planning:     Too soon to determine     Electronically signed by Carmelo Eagle MS, RD, LD on 10/2/21 at 12:43 PM EDT    Contact: [phone]

## 2021-10-02 NOTE — PROGRESS NOTES
Department of Neurosurgery  Attending Progress Note    CHIEF COMPLAINT:    SUBJECTIVE:  C/o headache and neck pain    ROS:  HEENT: positve for headache  CVS: negative for chest pain  Resp: negative for SOB  GI: positive for nausea    OBJECTIVE  Physical  VITALS:  BP (!) 146/86   Pulse 70   Temp 98.2 °F (36.8 °C)   Resp 18   Ht 5' 8\" (1.727 m)   Wt (!) 394 lb 10 oz (179 kg)   SpO2 95%   BMI 60.00 kg/m²   NEUROLOGIC:  Mental Status Exam:  Level of Alertness:   awake  Orientation:   person, place, time  Motor Exam:  Motor exam is symmetrical 5 out of 5 all extremities bilaterally  Sensory:  Sensory intact    Data  CBC:   Lab Results   Component Value Date    WBC 9.6 10/02/2021    RBC 4.19 10/02/2021    HGB 11.5 10/02/2021    HCT 35.5 10/02/2021    MCV 84.7 10/02/2021    MCH 27.4 10/02/2021    MCHC 32.4 10/02/2021    RDW 13.5 10/02/2021     10/02/2021    MPV 10.2 10/02/2021     BMP:    Lab Results   Component Value Date     10/02/2021    K 3.9 10/02/2021    K 3.8 06/22/2020    CL 98 10/02/2021    CO2 24 10/02/2021    BUN 12 10/02/2021    LABALBU 4.6 09/28/2021    CREATININE 0.7 10/02/2021    CALCIUM 8.9 10/02/2021    GFRAA >60 10/02/2021    LABGLOM >60 10/02/2021    GLUCOSE 104 10/02/2021     Current Inpatient Medications  Current Facility-Administered Medications: cloNIDine (CATAPRES) tablet 0.1 mg, 0.1 mg, Oral, TID  methocarbamol (ROBAXIN) tablet 1,500 mg, 1,500 mg, Oral, 4x Daily PRN  heparin (porcine) injection 5,000 Units, 5,000 Units, SubCUTAneous, Q8H  lisinopril (PRINIVIL;ZESTRIL) tablet 5 mg, 5 mg, Oral, Daily  oxyCODONE (ROXICODONE) immediate release tablet 5 mg, 5 mg, Oral, Q4H PRN  potassium chloride (KLOR-CON M) extended release tablet 20 mEq, 20 mEq, Oral, TID  gabapentin (NEURONTIN) capsule 600 mg, 600 mg, Oral, TID  trimethobenzamide (TIGAN) injection 200 mg, 200 mg, IntraMUSCular, Q6H PRN  butalbital-acetaminophen-caffeine (FIORICET, ESGIC) per tablet 1 tablet, 1 tablet, Oral, Q4H PRN  levETIRAcetam (KEPPRA) tablet 1,000 mg, 1,000 mg, Oral, BID  niMODipine (NIMOTOP) capsule 60 mg, 60 mg, Oral, 6 times per day  labetalol (NORMODYNE;TRANDATE) injection 10 mg, 10 mg, IntraVENous, Q10 Min PRN  hydrALAZINE (APRESOLINE) injection 10 mg, 10 mg, IntraVENous, Q10 Min PRN  polyethylene glycol (GLYCOLAX) packet 17 g, 17 g, Oral, Daily  sennosides-docusate sodium (SENOKOT-S) 8.6-50 MG tablet 2 tablet, 2 tablet, Oral, Daily  LORazepam (ATIVAN) tablet 0.5 mg, 0.5 mg, Oral, Q4H PRN  sodium chloride flush 0.9 % injection 5-40 mL, 5-40 mL, IntraVENous, 2 times per day  sodium chloride flush 0.9 % injection 5-40 mL, 5-40 mL, IntraVENous, PRN  0.9 % sodium chloride infusion, 25 mL, IntraVENous, PRN  LORazepam (ATIVAN) injection 2 mg, 2 mg, IntraVENous, Q4H PRN    ASSESSMENT AND PLAN:    Patient with angio negative SAS. Stabll.   Will repeat angiogram.  Will continue meds for pain control    Abhishek Ang MD

## 2021-10-02 NOTE — PLAN OF CARE
Neurology is following for subarachnoid hemorrhage.      She presented after the onset of a severe headache. CT of the head showed extra-axial hyperdensity anterior to the brainstem in the inferior aspect of the kayla. CTA of the head and neck did not reveal any aneurysm. Diagnostic angio did not show any obvious aneurysm, but study was compromised due to patient's inability to cooperate. - NS plans to repeat     ?seizure - despite bleeding near brainstem - EEG unrevealing for epileptiform activity     Awaiting tx to [address] have been stable    Repeat CT Head unrevealing - personally appreciate small amount blood present - improved.            She is resting quietly today -- I did not awaken her this am.   Discussed with ICU team  Will notify with changes

## 2021-10-02 NOTE — PROGRESS NOTES
Chief Complaint:  Chief Complaint   Patient presents with    Generalized Body Aches     Pain from head to legs, states tingling in legs. Knee replacement right side in July. Took \" a whole bunch of Oxy 2 nights ago plus drank alcohol\"  Pt diaphoretic upon EMS arrival     [facility] (subarachnoid hemorrhage) ([address]     Subjective:    Still complaining of HA but says the dilaudid is helping. She herself reports another seizure-like episode last night, though I don't actually see documentation of this      Objective:    /87   Pulse 65   Temp 98 °F (36.7 °C) (Oral)   Resp 21   Ht 5' 8\" (1.727 m)   Wt (!) 394 lb 10 oz (179 kg)   SpO2 96%   BMI 60.00 kg/m²     Current medications that patient is taking have been reviewed.     General appearance: NAD, morbidly obese  HEENT: AT/NC, MMM  Neck: FROM, supple  Lungs: Clear to auscultation anteriorly, WOB normal  CV: RRR, no MRGs  Abdomen: Soft, non-tender; no masses or HSM, +BS  Extremities: No peripheral edema or digital cyanosis  Skin: no rash, lesions or ulcers  Psych: Calm and cooperative  Neuro: Alert and interactive, face symmetric, EOMI, handgrips equal b/l, foot plantar/dorsiflexors equal b/l, effort stronger today    Labs:  CBC with Differential:    Lab Results   Component Value Date    WBC 9.6 10/02/2021    RBC 4.19 10/02/2021    HGB 11.5 10/02/2021    HCT 35.5 10/02/2021     10/02/2021    MCV 84.7 10/02/2021    MCH 27.4 10/02/2021    MCHC 32.4 10/02/2021    RDW 13.5 10/02/2021    LYMPHOPCT 15.5 09/28/2021    MONOPCT 8.9 09/28/2021    BASOPCT 0.6 09/28/2021    MONOSABS 0.97 09/28/2021    LYMPHSABS 1.68 09/28/2021    EOSABS 0.35 09/28/2021    BASOSABS 0.07 09/28/2021     CMP:    Lab Results   Component Value Date     10/02/2021    K 3.9 10/02/2021    K 3.8 06/22/2020    CL 98 10/02/2021    CO2 24 10/02/2021    BUN 12 10/02/2021    CREATININE 0.7 10/02/2021    GFRAA >60 10/02/2021    LABGLOM >60 10/02/2021    GLUCOSE 104 10/02/2021    PROT 7.0 09/28/2021    LABALBU 4.6 09/28/2021    CALCIUM 8.9 10/02/2021    BILITOT 0.8 09/28/2021    ALKPHOS 113 09/28/2021    AST 37 09/28/2021    ALT 38 09/28/2021          Assessment/Plan:  Principal Problem:    SAH (subarachnoid hemorrhage) ([facility])  Active Problems:    Bipolar disorder ([facility] [address]    HTN (hypertension), benign    Mixed hyperlipidemia    Morbid obesity with BMI of 60.0-69.9, adult (HCC)    Lactic acid acidosis    Hypokalemia    Seizure ([facility] [address]  Resolved Problems:    * No resolved hospital problems. *       BP well controlled    Off nicardipine    Continue PO nimodipine and lisinopril    Clonidine has been added    Patient accepted at [facility]. [facility] is not taking transfers. [facility] is also not taking transfers but will take people onto a nebulous waitlist for which they can have conferences about to discuss triage. Sounds not promising. It's not a good use of anyone's resources at this point to do that when we already have an accepting institution [facility]) that took me many hours to procure. Continue Keppra    Low dose Ativan PRN agitation; high dose PRN seizure.     Requires continued inpatient level of care     Holly Ace MD    7:02 PM  10/2/2021

## 2021-10-03 ENCOUNTER — APPOINTMENT (OUTPATIENT)
Dept: ULTRASOUND IMAGING | Age: 46
DRG: 065 | End: 2021-10-03
Payer: COMMERCIAL

## 2021-10-03 ENCOUNTER — APPOINTMENT (OUTPATIENT)
Dept: CT IMAGING | Age: 46
DRG: 065 | End: 2021-10-03
Payer: COMMERCIAL

## 2021-10-03 LAB
ANION GAP SERPL CALCULATED.3IONS-SCNC: 14 MMOL/L (ref 7–16)
BUN BLDV-MCNC: 11 MG/DL (ref 6–20)
CALCIUM IONIZED: 1.27 MMOL/L (ref 1.15–1.33)
CALCIUM SERPL-MCNC: 9.4 MG/DL (ref 8.6–10.2)
CHLORIDE BLD-SCNC: 98 MMOL/L (ref 98–107)
CO2: 22 MMOL/L (ref 22–29)
CREAT SERPL-MCNC: 0.6 MG/DL (ref 0.5–1)
GFR AFRICAN AMERICAN: >60
GFR NON-AFRICAN AMERICAN: >60 ML/MIN/1.73
GLUCOSE BLD-MCNC: 125 MG/DL (ref 74–99)
HCT VFR BLD CALC: 36.4 % (ref 34–48)
HEMOGLOBIN: 12 G/DL (ref 11.5–15.5)
MAGNESIUM: 2.3 MG/DL (ref 1.6–2.6)
MCH RBC QN AUTO: 27.1 PG (ref 26–35)
MCHC RBC AUTO-ENTMCNC: 33 % (ref 32–34.5)
MCV RBC AUTO: 82.4 FL (ref 80–99.9)
PDW BLD-RTO: 13.6 FL (ref 11.5–15)
PHOSPHORUS: 3.4 MG/DL (ref 2.5–4.5)
PLATELET # BLD: 267 E9/L (ref 130–450)
PMV BLD AUTO: 9.8 FL (ref 7–12)
POTASSIUM SERPL-SCNC: 4.3 MMOL/L (ref 3.5–5)
RBC # BLD: 4.42 E12/L (ref 3.5–5.5)
SODIUM BLD-SCNC: 134 MMOL/L (ref 132–146)
WBC # BLD: 8.9 E9/L (ref 4.5–11.5)

## 2021-10-03 PROCEDURE — 85027 COMPLETE CBC AUTOMATED: CPT

## 2021-10-03 PROCEDURE — 36415 COLL VENOUS BLD VENIPUNCTURE: CPT

## 2021-10-03 PROCEDURE — 2500000003 HC RX 250 WO HCPCS: Performed by: NURSE PRACTITIONER

## 2021-10-03 PROCEDURE — 84100 ASSAY OF PHOSPHORUS: CPT

## 2021-10-03 PROCEDURE — 93886 INTRACRANIAL COMPLETE STUDY: CPT | Performed by: RADIOLOGY

## 2021-10-03 PROCEDURE — 6360000002 HC RX W HCPCS: Performed by: NEUROLOGICAL SURGERY

## 2021-10-03 PROCEDURE — 82330 ASSAY OF CALCIUM: CPT

## 2021-10-03 PROCEDURE — 6370000000 HC RX 637 (ALT 250 FOR IP): Performed by: STUDENT IN AN ORGANIZED HEALTH CARE EDUCATION/TRAINING PROGRAM

## 2021-10-03 PROCEDURE — 2580000003 HC RX 258: Performed by: EMERGENCY MEDICINE

## 2021-10-03 PROCEDURE — 6360000002 HC RX W HCPCS: Performed by: NURSE PRACTITIONER

## 2021-10-03 PROCEDURE — 99232 SBSQ HOSP IP/OBS MODERATE 35: CPT | Performed by: NEUROLOGICAL SURGERY

## 2021-10-03 PROCEDURE — 6370000000 HC RX 637 (ALT 250 FOR IP): Performed by: NEUROLOGICAL SURGERY

## 2021-10-03 PROCEDURE — 70450 CT HEAD/BRAIN W/O DYE: CPT

## 2021-10-03 PROCEDURE — 6370000000 HC RX 637 (ALT 250 FOR IP): Performed by: NURSE PRACTITIONER

## 2021-10-03 PROCEDURE — 83735 ASSAY OF MAGNESIUM: CPT

## 2021-10-03 PROCEDURE — 6370000000 HC RX 637 (ALT 250 FOR IP): Performed by: SURGERY

## 2021-10-03 PROCEDURE — 80048 BASIC METABOLIC PNL TOTAL CA: CPT

## 2021-10-03 PROCEDURE — 93886 INTRACRANIAL COMPLETE STUDY: CPT

## 2021-10-03 PROCEDURE — 2060000000 HC ICU INTERMEDIATE R&B

## 2021-10-03 PROCEDURE — 6370000000 HC RX 637 (ALT 250 FOR IP): Performed by: INTERNAL MEDICINE

## 2021-10-03 RX ORDER — CARVEDILOL 6.25 MG/1
12.5 TABLET ORAL 2 TIMES DAILY WITH MEALS
Status: DISCONTINUED | OUTPATIENT
Start: 2021-10-04 | End: 2021-10-05

## 2021-10-03 RX ORDER — BUTALBITAL, ACETAMINOPHEN AND CAFFEINE 50; 325; 40 MG/1; MG/1; MG/1
1 TABLET ORAL 2 TIMES DAILY PRN
Status: DISCONTINUED | OUTPATIENT
Start: 2021-10-03 | End: 2021-10-06 | Stop reason: HOSPADM

## 2021-10-03 RX ORDER — CLONIDINE HYDROCHLORIDE 0.1 MG/1
0.1 TABLET ORAL 3 TIMES DAILY
Status: DISCONTINUED | OUTPATIENT
Start: 2021-10-03 | End: 2021-10-06 | Stop reason: HOSPADM

## 2021-10-03 RX ORDER — CARVEDILOL 6.25 MG/1
6.25 TABLET ORAL ONCE
Status: COMPLETED | OUTPATIENT
Start: 2021-10-03 | End: 2021-10-03

## 2021-10-03 RX ADMIN — OXYCODONE 5 MG: 5 TABLET ORAL at 08:39

## 2021-10-03 RX ADMIN — OXYCODONE 5 MG: 5 TABLET ORAL at 19:04

## 2021-10-03 RX ADMIN — NIMODIPINE 60 MG: 30 CAPSULE, LIQUID FILLED ORAL at 08:10

## 2021-10-03 RX ADMIN — POTASSIUM CHLORIDE 20 MEQ: 20 TABLET, EXTENDED RELEASE ORAL at 20:57

## 2021-10-03 RX ADMIN — OXYCODONE 5 MG: 5 TABLET ORAL at 23:05

## 2021-10-03 RX ADMIN — OXYCODONE 5 MG: 5 TABLET ORAL at 14:15

## 2021-10-03 RX ADMIN — NIMODIPINE 60 MG: 30 CAPSULE, LIQUID FILLED ORAL at 20:54

## 2021-10-03 RX ADMIN — LABETALOL HYDROCHLORIDE 10 MG: 5 INJECTION, SOLUTION INTRAVENOUS at 06:35

## 2021-10-03 RX ADMIN — CLONIDINE HYDROCHLORIDE 0.2 MG: 0.2 TABLET ORAL at 08:09

## 2021-10-03 RX ADMIN — GABAPENTIN 600 MG: 300 CAPSULE ORAL at 08:12

## 2021-10-03 RX ADMIN — CARVEDILOL 6.25 MG: 6.25 TABLET, FILM COATED ORAL at 16:57

## 2021-10-03 RX ADMIN — LABETALOL HYDROCHLORIDE 10 MG: 5 INJECTION, SOLUTION INTRAVENOUS at 13:09

## 2021-10-03 RX ADMIN — NIMODIPINE 60 MG: 30 CAPSULE, LIQUID FILLED ORAL at 12:07

## 2021-10-03 RX ADMIN — NIMODIPINE 60 MG: 30 CAPSULE, LIQUID FILLED ORAL at 16:57

## 2021-10-03 RX ADMIN — LABETALOL HYDROCHLORIDE 10 MG: 5 INJECTION, SOLUTION INTRAVENOUS at 05:48

## 2021-10-03 RX ADMIN — OXYCODONE 5 MG: 5 TABLET ORAL at 00:33

## 2021-10-03 RX ADMIN — Medication 10 ML: at 08:14

## 2021-10-03 RX ADMIN — HEPARIN SODIUM 5000 UNITS: 10000 INJECTION INTRAVENOUS; SUBCUTANEOUS at 15:53

## 2021-10-03 RX ADMIN — HEPARIN SODIUM 5000 UNITS: 10000 INJECTION INTRAVENOUS; SUBCUTANEOUS at 23:05

## 2021-10-03 RX ADMIN — HYDRALAZINE HYDROCHLORIDE 10 MG: 20 INJECTION INTRAMUSCULAR; INTRAVENOUS at 05:20

## 2021-10-03 RX ADMIN — GABAPENTIN 600 MG: 300 CAPSULE ORAL at 20:55

## 2021-10-03 RX ADMIN — BUTALBITAL, ACETAMINOPHEN, AND CAFFEINE 1 TABLET: 50; 325; 40 TABLET ORAL at 00:34

## 2021-10-03 RX ADMIN — HYDRALAZINE HYDROCHLORIDE 10 MG: 20 INJECTION INTRAMUSCULAR; INTRAVENOUS at 04:17

## 2021-10-03 RX ADMIN — POTASSIUM CHLORIDE 20 MEQ: 20 TABLET, EXTENDED RELEASE ORAL at 08:13

## 2021-10-03 RX ADMIN — TRIMETHOBENZAMIDE HYDROCHLORIDE 200 MG: 100 INJECTION INTRAMUSCULAR at 10:48

## 2021-10-03 RX ADMIN — HYDRALAZINE HYDROCHLORIDE 10 MG: 20 INJECTION INTRAMUSCULAR; INTRAVENOUS at 23:09

## 2021-10-03 RX ADMIN — GABAPENTIN 600 MG: 300 CAPSULE ORAL at 13:33

## 2021-10-03 RX ADMIN — METHOCARBAMOL TABLETS 1500 MG: 750 TABLET, COATED ORAL at 23:05

## 2021-10-03 RX ADMIN — BUTALBITAL, ACETAMINOPHEN, AND CAFFEINE 1 TABLET: 50; 325; 40 TABLET ORAL at 06:37

## 2021-10-03 RX ADMIN — LEVETIRACETAM 1000 MG: 500 TABLET, FILM COATED ORAL at 20:54

## 2021-10-03 RX ADMIN — HEPARIN SODIUM 5000 UNITS: 10000 INJECTION INTRAVENOUS; SUBCUTANEOUS at 05:47

## 2021-10-03 RX ADMIN — OXYCODONE 5 MG: 5 TABLET ORAL at 04:34

## 2021-10-03 RX ADMIN — CLONIDINE HYDROCHLORIDE 0.1 MG: 0.1 TABLET ORAL at 20:54

## 2021-10-03 RX ADMIN — LEVETIRACETAM 1000 MG: 500 TABLET, FILM COATED ORAL at 08:11

## 2021-10-03 RX ADMIN — BUTALBITAL, ACETAMINOPHEN, AND CAFFEINE 1 TABLET: 50; 325; 40 TABLET ORAL at 16:57

## 2021-10-03 RX ADMIN — NIMODIPINE 60 MG: 30 CAPSULE, LIQUID FILLED ORAL at 00:30

## 2021-10-03 RX ADMIN — METHOCARBAMOL TABLETS 1500 MG: 750 TABLET, COATED ORAL at 10:51

## 2021-10-03 RX ADMIN — HYDRALAZINE HYDROCHLORIDE 10 MG: 20 INJECTION INTRAMUSCULAR; INTRAVENOUS at 10:13

## 2021-10-03 RX ADMIN — TRIMETHOBENZAMIDE HYDROCHLORIDE 200 MG: 100 INJECTION INTRAMUSCULAR at 23:05

## 2021-10-03 RX ADMIN — LABETALOL HYDROCHLORIDE 10 MG: 5 INJECTION, SOLUTION INTRAVENOUS at 16:59

## 2021-10-03 RX ADMIN — HYDRALAZINE HYDROCHLORIDE 10 MG: 20 INJECTION INTRAMUSCULAR; INTRAVENOUS at 20:55

## 2021-10-03 RX ADMIN — METHOCARBAMOL TABLETS 1500 MG: 750 TABLET, COATED ORAL at 16:57

## 2021-10-03 RX ADMIN — NIMODIPINE 60 MG: 30 CAPSULE, LIQUID FILLED ORAL at 04:14

## 2021-10-03 RX ADMIN — CLONIDINE HYDROCHLORIDE 0.2 MG: 0.2 TABLET ORAL at 13:08

## 2021-10-03 RX ADMIN — TRIMETHOBENZAMIDE HYDROCHLORIDE 200 MG: 100 INJECTION INTRAMUSCULAR at 16:58

## 2021-10-03 RX ADMIN — Medication 10 ML: at 20:56

## 2021-10-03 RX ADMIN — CARVEDILOL 6.25 MG: 6.25 TABLET, FILM COATED ORAL at 20:54

## 2021-10-03 RX ADMIN — CARVEDILOL 6.25 MG: 6.25 TABLET, FILM COATED ORAL at 08:13

## 2021-10-03 ASSESSMENT — PAIN SCALES - GENERAL
PAINLEVEL_OUTOF10: 8
PAINLEVEL_OUTOF10: 0
PAINLEVEL_OUTOF10: 8
PAINLEVEL_OUTOF10: 8
PAINLEVEL_OUTOF10: 7
PAINLEVEL_OUTOF10: 10
PAINLEVEL_OUTOF10: 2
PAINLEVEL_OUTOF10: 8
PAINLEVEL_OUTOF10: 0
PAINLEVEL_OUTOF10: 0
PAINLEVEL_OUTOF10: 7
PAINLEVEL_OUTOF10: 10
PAINLEVEL_OUTOF10: 10

## 2021-10-03 ASSESSMENT — PAIN DESCRIPTION - PROGRESSION
CLINICAL_PROGRESSION: NOT CHANGED

## 2021-10-03 ASSESSMENT — PAIN DESCRIPTION - DESCRIPTORS
DESCRIPTORS: ACHING;DISCOMFORT
DESCRIPTORS: ACHING;DISCOMFORT
DESCRIPTORS: HEADACHE
DESCRIPTORS: HEADACHE
DESCRIPTORS: ACHING;DISCOMFORT

## 2021-10-03 ASSESSMENT — PAIN DESCRIPTION - LOCATION
LOCATION: HEAD
LOCATION: BACK
LOCATION: NECK
LOCATION: HEAD
LOCATION: BACK

## 2021-10-03 ASSESSMENT — PAIN DESCRIPTION - ONSET
ONSET: GRADUAL
ONSET: ON-GOING
ONSET: GRADUAL

## 2021-10-03 ASSESSMENT — PAIN - FUNCTIONAL ASSESSMENT
PAIN_FUNCTIONAL_ASSESSMENT: PREVENTS OR INTERFERES SOME ACTIVE ACTIVITIES AND ADLS
PAIN_FUNCTIONAL_ASSESSMENT: PREVENTS OR INTERFERES SOME ACTIVE ACTIVITIES AND ADLS
PAIN_FUNCTIONAL_ASSESSMENT: ACTIVITIES ARE NOT PREVENTED

## 2021-10-03 ASSESSMENT — PAIN DESCRIPTION - FREQUENCY
FREQUENCY: CONTINUOUS
FREQUENCY: CONTINUOUS
FREQUENCY: INTERMITTENT
FREQUENCY: INTERMITTENT

## 2021-10-03 ASSESSMENT — PAIN DESCRIPTION - PAIN TYPE
TYPE: ACUTE PAIN
TYPE: ACUTE PAIN
TYPE: CHRONIC PAIN
TYPE: CHRONIC PAIN

## 2021-10-03 NOTE — PROGRESS NOTES
Neuro Science Intensive Care Unit  Critical Care  Daily Progress Note 10/3/2021    Date of Admission: 9/27/2021    CC:  \"I've never had a headache this long. \"  Follow up for [address] Jacqueline Arenas: 9/27  Patient presented secondary to general body aches found to have a subarachnoid hemorrhage secondary to intracranial aneurysm.  Started on nicardipine drip to help control blood pressure.  Patient developed acute symptomatic seizures secondary to brain bleed    9/28: Patient fell yesterday secondary to her knees giving out while getting up to go to the bathroom.  Patient was redirected in told to lie in bed secondary to her subarachnoid bleed.  Neurosurgery was asked to evaluate patient.  We will continue Keppra.  Patient's GCS is 14 -1 off her confusion today. 09/29  No issues overnight. Remains on Cardene. More alert this am.      09/30  No issues overnight. No reported seizures,  Weaned off Cardene. No evidence of vasospasma per Transcranial doppler. Required 1 dose of antihypertensive agent. Received 2 doses of ativan for anxiety. For pain control: 5 doses of Fioricet &  3 doses of morphine. cONFUSED THIS AM.  Stat HCT. Transcranial doppler study negative for vasospasm. Confusion due to med versus sleep apnea. 10/01 Reports having a seizure last night. BP remains fluctuating off Cardene drip. Daily TCD remains negative for vasospasms. Required several doses of IV labetaolol for BP control. Received multiple doses of Fioricet for pain control. Home CPAP used last night. 10/3 Continues with vague complaints.  Has refused to get out of bed since admission    PHYSICAL EXAM:    /73   Pulse 67   Temp 97.9 °F (36.6 °C) (Temporal)   Resp 16   Ht 5' 8\" (1.727 m)   Wt (!) 394 lb 10 oz (179 kg)   SpO2 97%   BMI 60.00 kg/m²     Intake/Output Summary (Last 24 hours) at 10/3/2021 1156  Last data filed at 10/3/2021 0926  Gross per 24 hour   Intake 2110 ml   Output 3200 ml   Net -1090 ml         General appearance:  Comfortable. Reports neck and back pain along with frontal headache      NEUROLOGIC:        GCS:    4 - Opens eyes on own   6 - Follows simple motor commands  5 - Alert and oriented       Pupil size:  Left 3 mm  Right 3 mm  Pupil reaction: Yes   PERRLA  Wiggles fingers: Left Yes Right Yes  Hand grasp:   Left present     Right present  Wiggles toes: Left Yes    Right Yes  Plantar flexion: Left present    Right present  Speech:  Clear      CONSTITUTIONAL: No acute distress, lying in hospital bed  CARDIOVASCULAR: S1 S2, regular rate, regular rhythm, no murmur, gallop or rub Monitor: NSR  PULMONARY:  Respirations unlabored. No rhonchi/rales/wheezes. RENAL:  voids   ABDOMEN: Soft, nontender, nondistended, nontympanic, normal bowel sounds. General diet. MUSCULOSKELETAL: Moves all extremities purposefully, 5/5 strength   SKIN/EXTREMITIES: No rashes/ecchymosis, no edema/clubbing, warm/dry, good capillary refill. IV ACCESS:   peripheral      ASSESSMENT/PLAN:     Principal Problem:    SAH (subarachnoid hemorrhage) ([facility])  Active Problems:    Bipolar disorder ([facility] [address]    HTN (hypertension), benign    Mixed hyperlipidemia    Morbid obesity with BMI of 60.0-69.9, adult ([facility])    Lactic acid acidosis    Hypokalemia    Seizure ([facility])  Resolved Problems:    * No resolved hospital problems. *      Neuro:  SAH. Seizures. Hx of bipolar. Monitor neuro status. Neurosurgery following. Neurology following. Keppra AtivanStroke education. Stroke protocol. Nimodipine for cerebral artery spasms. Daily Transcranial US neg for vasospasms. CT clear. Speech therapy EEG - no seizures. CV: Hypertensive emergency. Hx of HTN and Hyperlipidemia. Monitor hemodynamics. BP goal systolic less than 984 mm Hg. PRN hydralzine & labetalol. Statin. Start clonidine. Pulm: No acute issues. Hx of sleep apnea. Monitor RR & SpO2.   Home CPAP at bedside-reports wearing it last night  GI: No acute issues. Hx of GERD & diverticulosis. Tolerating diet. Monitor bowel function. Renal:  No acute issues. Monitor BUN & Cr, electrolytes & replace as needed. Monitor I & O.    ID:  No acute issues  Endocrine:    No acute issues  MSK:. No acute issues. PT & OT  OOB  Heme: no acute issues. Monitor CBC        Bowel regime: glycolax & senna  Pain control/Sedation:  P, Tylenol, Ativan and Fioricet,   DVT prophylaxis: SCDs. Heparin   GI prophylaxis: On Regular diet   Consults:   Medicine, Neurosurgery, Neurology, Critical Care  Patient/Family update: Will update when available  Code status:  Full Code      Disposition:  Continue ICU. Waiting for bed to become available at [facility]. Luis Wynn DNP.  APRN-CNP  10/3/2021  11:56 AM

## 2021-10-03 NOTE — PLAN OF CARE
Problem: Falls - Risk of:  Goal: Will remain free from falls  Description: Will remain free from falls  Outcome: Met This Shift  Goal: Absence of physical injury  Description: Absence of physical injury  Outcome: Met This Shift     Problem: Pain:  Description: Pain management should include both nonpharmacologic and pharmacologic interventions. Goal: Pain level will decrease  Description: Pain level will decrease  Outcome: Met This Shift  Goal: Control of acute pain  Description: Control of acute pain  Outcome: Met This Shift  Goal: Control of chronic pain  Description: Control of chronic pain  Outcome: Met This Shift     Problem: HEMODYNAMIC STATUS  Goal: Patient has stable vital signs and fluid balance  Outcome: Met This Shift     Problem: ACTIVITY INTOLERANCE/IMPAIRED MOBILITY  Goal: Mobility/activity is maintained at optimum level for patient  Outcome: Met This Shift     Problem: COMMUNICATION IMPAIRMENT  Goal: Ability to express needs and understand communication  Outcome: Met This Shift     Problem:  Bowel/Gastric:  Goal: Control of bowel function will improve  Description: Control of bowel function will improve  Outcome: Met This Shift  Goal: Ability to achieve a regular elimination pattern will improve  Description: Ability to achieve a regular elimination pattern will improve  Outcome: Met This Shift     Problem: Skin Integrity:  Goal: Risk for impaired skin integrity will decrease  Description: Risk for impaired skin integrity will decrease  Outcome: Met This Shift  Goal: Will show no infection signs and symptoms  Description: Will show no infection signs and symptoms  Outcome: Met This Shift  Goal: Absence of new skin breakdown  Description: Absence of new skin breakdown  Outcome: Met This Shift     Problem: Anxiety:  Goal: Level of anxiety will decrease  Description: Level of anxiety will decrease  Outcome: Met This Shift
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
## 2021-10-03 NOTE — PROGRESS NOTES
Department of Neurosurgery  Attending Progress Note    CHIEF COMPLAINT: seen for [address]    SUBJECTIVE:  C/o headache and back pain    ROS:  HEENT: positve for headache  CVS: negative for chest pain  Resp: negative for SOB  GI: positive for nausea    OBJECTIVE  Physical  VITALS:  /75   Pulse 67   Temp 97.9 °F (36.6 °C) (Temporal)   Resp 20   Ht 5' 8\" (1.727 m)   Wt (!) 394 lb 10 oz (179 kg)   SpO2 98%   BMI 60.00 kg/m²   NEUROLOGIC:  Mental Status Exam:  Level of Alertness:   awake  Orientation:   person, place, time  Motor Exam:  Motor exam is symmetrical 5 out of 5 all extremities bilaterally  Sensory:  Sensory intact    Data  CBC:   Lab Results   Component Value Date    WBC 8.9 10/03/2021    RBC 4.42 10/03/2021    HGB 12.0 10/03/2021    HCT 36.4 10/03/2021    MCV 82.4 10/03/2021    MCH 27.1 10/03/2021    MCHC 33.0 10/03/2021    RDW 13.6 10/03/2021     10/03/2021    MPV 9.8 10/03/2021     BMP:    Lab Results   Component Value Date     10/03/2021    K 4.3 10/03/2021    K 3.8 06/22/2020    CL 98 10/03/2021    CO2 22 10/03/2021    BUN 11 10/03/2021    LABALBU 4.6 09/28/2021    CREATININE 0.6 10/03/2021    CALCIUM 9.4 10/03/2021    GFRAA >60 10/03/2021    LABGLOM >60 10/03/2021    GLUCOSE 125 10/03/2021     Current Inpatient Medications  Current Facility-Administered Medications: cloNIDine (CATAPRES) tablet 0.2 mg, 0.2 mg, Oral, TID  carvedilol (COREG) tablet 6.25 mg, 6.25 mg, Oral, BID WC  methocarbamol (ROBAXIN) tablet 1,500 mg, 1,500 mg, Oral, 4x Daily PRN  heparin (porcine) injection 5,000 Units, 5,000 Units, SubCUTAneous, Q8H  oxyCODONE (ROXICODONE) immediate release tablet 5 mg, 5 mg, Oral, Q4H PRN  potassium chloride (KLOR-CON M) extended release tablet 20 mEq, 20 mEq, Oral, TID  gabapentin (NEURONTIN) capsule 600 mg, 600 mg, Oral, TID  trimethobenzamide (TIGAN) injection 200 mg, 200 mg, IntraMUSCular, Q6H PRN  butalbital-acetaminophen-caffeine (FIORICET, ESGIC) per tablet 1 tablet, 1 tablet, Oral, Q4H PRN  levETIRAcetam (KEPPRA) tablet 1,000 mg, 1,000 mg, Oral, BID  niMODipine (NIMOTOP) capsule 60 mg, 60 mg, Oral, 6 times per day  labetalol (NORMODYNE;TRANDATE) injection 10 mg, 10 mg, IntraVENous, Q10 Min PRN  hydrALAZINE (APRESOLINE) injection 10 mg, 10 mg, IntraVENous, Q10 Min PRN  polyethylene glycol (GLYCOLAX) packet 17 g, 17 g, Oral, Daily  sennosides-docusate sodium (SENOKOT-S) 8.6-50 MG tablet 2 tablet, 2 tablet, Oral, Daily  LORazepam (ATIVAN) tablet 0.5 mg, 0.5 mg, Oral, Q4H PRN  sodium chloride flush 0.9 % injection 5-40 mL, 5-40 mL, IntraVENous, 2 times per day  sodium chloride flush 0.9 % injection 5-40 mL, 5-40 mL, IntraVENous, PRN  LORazepam (ATIVAN) injection 2 mg, 2 mg, IntraVENous, Q4H PRN    ASSESSMENT AND PLAN:    Patient with angio negative SAH. Stable. Will monitor BP. If medically stable, will consider transfer to [facility] bed.   Will likely obtain head CT prior to any transfer    Krista Artis MD

## 2021-10-03 NOTE — PROGRESS NOTES
Patient knocked over table had home cpap, eyeglasses, cup, sunglasses, cell phone on it. Everything looked intact except for drinking cup has a crack in it.

## 2021-10-03 NOTE — PLAN OF CARE
Notes reviewed  No longer in SICU   Awaiting tx to [facility]    Patient for repeat CT Head today    TCDs have been stable     Notes reviewed    Still awaiting second angio    Dr Elvin Stone available tomorrow    NS following  No further \"seizures\" have been reported

## 2021-10-03 NOTE — FLOWSHEET NOTE
Pt transported to PACU per Dr. Ramon South order. Pt belongings, glasses, sunglasses, cpap home machine, stuffed animal, silver colored bracelet, hospital meds and hospital toiletries, cell phone transported and given to [facility].

## 2021-10-03 NOTE — PROGRESS NOTES
Eyeglasses, partial plate, sunglasses, cell phone and cord, cpap and cord, two black masks, pink  Stuffed dog, drinking cup sent with patient.

## 2021-10-03 NOTE — PLAN OF CARE
Problem: Falls - Risk of:  Goal: Will remain free from falls  Description: Will remain free from falls  10/3/2021 0916 by Saira Hernandez RN  Outcome: Met This Shift  10/3/2021 0101 by Tye Fernandez RN  Outcome: Met This Shift  Goal: Absence of physical injury  Description: Absence of physical injury  10/3/2021 0916 by Saira Hernandez RN  Outcome: Met This Shift  10/3/2021 0101 by Tye Fernandez RN  Outcome: Met This Shift     Problem: Pain:  Goal: Pain level will decrease  Description: Pain level will decrease  10/3/2021 0916 by Saira Hernandez RN  Outcome: Met This Shift  10/3/2021 0101 by Tye Fernandez RN  Outcome: Met This Shift  Goal: Control of acute pain  Description: Control of acute pain  10/3/2021 0916 by Saira Hernandez RN  Outcome: Met This Shift  10/3/2021 0101 by Tye Fernandez RN  Outcome: Met This Shift  Goal: Control of chronic pain  Description: Control of chronic pain  10/3/2021 0916 by Saira Hernandez RN  Outcome: Met This Shift  10/3/2021 0101 by Tye Fernandez RN  Outcome: Met This Shift     Problem: HEMODYNAMIC STATUS  Goal: Patient has stable vital signs and fluid balance  10/3/2021 0916 by Saira Hernandez RN  Outcome: Ongoing  10/3/2021 0101 by Tye Fernandez RN  Outcome: Met This Shift     Problem: ACTIVITY INTOLERANCE/IMPAIRED MOBILITY  Goal: Mobility/activity is maintained at optimum level for patient  10/3/2021 0916 by Saira Hernandez RN  Outcome: Ongoing  10/3/2021 0101 by Tye Fernandez RN  Outcome: Met This Shift     Problem: COMMUNICATION IMPAIRMENT  Goal: Ability to express needs and understand communication  10/3/2021 0916 by Saira Hernandez RN  Outcome: Met This Shift  10/3/2021 0101 by yTe Fernandez RN  Outcome: Met This Shift     Problem:  Bowel/Gastric:  Goal: Control of bowel function will improve  Description: Control of bowel function will improve  10/3/2021 0916 by Saira Hernandez RN  Outcome: Met This Shift  10/3/2021 0101 by Tye Fernandez RN  Outcome: Met This Shift  Goal: Ability to achieve a regular elimination pattern will improve  Description: Ability to achieve a regular elimination pattern will improve  10/3/2021 0916 by Lilli Myles RN  Outcome: Met This Shift  10/3/2021 0101 by Caryn Chaudhry RN  Outcome: Met This Shift     Problem: Nutritional:  Goal: Ability to follow a diet with enough fiber (20 to 30 grams) for normal bowel function will improve  Description: Ability to follow a diet with enough fiber (20 to 30 grams) for normal bowel function will improve  10/3/2021 0916 by Lilli Myles RN  Outcome: Met This Shift  10/3/2021 0101 by Caryn Chaudhry RN  Outcome: Met This Shift     Problem: Skin Integrity:  Goal: Risk for impaired skin integrity will decrease  Description: Risk for impaired skin integrity will decrease  10/3/2021 0916 by Lilli Myles RN  Outcome: Met This Shift  10/3/2021 0101 by Caryn Chaudhry RN  Outcome: Met This Shift  Goal: Will show no infection signs and symptoms  Description: Will show no infection signs and symptoms  10/3/2021 0916 by Lilli Myles RN  Outcome: Met This Shift  10/3/2021 0101 by Caryn Chaudhry RN  Outcome: Met This Shift  Goal: Absence of new skin breakdown  Description: Absence of new skin breakdown  10/3/2021 0916 by Lilli Myles RN  Outcome: Met This Shift  10/3/2021 0101 by Caryn Chaudhry RN  Outcome: Met This Shift     Problem: Anxiety:  Goal: Level of anxiety will decrease  Description: Level of anxiety will decrease  10/3/2021 0916 by Lilli Myles RN  Outcome: Ongoing  10/3/2021 0101 by Caryn Chaudhry RN  Outcome: Met This Shift     Problem: Non-Violent Restraints  Goal: Removal from restraints as soon as assessed to be safe  10/3/2021 0916 by Lilli Myles RN  Outcome: Completed  10/3/2021 0101 by Caryn Chaudhry RN  Outcome: Met This Shift  Goal: No harm/injury to patient while restraints in use  10/3/2021 0916 by Lilli Myles RN  Outcome: Completed  10/3/2021 0101 by Caryn Chaudhry RN  Outcome: Met This Shift  Goal: Patient's dignity will be maintained  10/3/2021 0916 by Roxanne Sanchez RN  Outcome: Completed  10/3/2021 0101 by Michael Smith RN  Outcome: Met This Shift

## 2021-10-04 LAB
ANION GAP SERPL CALCULATED.3IONS-SCNC: 14 MMOL/L (ref 7–16)
BUN BLDV-MCNC: 14 MG/DL (ref 6–20)
CALCIUM IONIZED: 1.26 MMOL/L (ref 1.15–1.33)
CALCIUM SERPL-MCNC: 9.2 MG/DL (ref 8.6–10.2)
CHLORIDE BLD-SCNC: 96 MMOL/L (ref 98–107)
CO2: 19 MMOL/L (ref 22–29)
CREAT SERPL-MCNC: 0.7 MG/DL (ref 0.5–1)
GFR AFRICAN AMERICAN: >60
GFR NON-AFRICAN AMERICAN: >60 ML/MIN/1.73
GLUCOSE BLD-MCNC: 122 MG/DL (ref 74–99)
HCT VFR BLD CALC: 38.2 % (ref 34–48)
HEMOGLOBIN: 12.7 G/DL (ref 11.5–15.5)
MAGNESIUM: 2.2 MG/DL (ref 1.6–2.6)
MCH RBC QN AUTO: 27.2 PG (ref 26–35)
MCHC RBC AUTO-ENTMCNC: 33.2 % (ref 32–34.5)
MCV RBC AUTO: 81.8 FL (ref 80–99.9)
PDW BLD-RTO: 14.1 FL (ref 11.5–15)
PHOSPHORUS: 3.6 MG/DL (ref 2.5–4.5)
PLATELET # BLD: 315 E9/L (ref 130–450)
PMV BLD AUTO: 10.2 FL (ref 7–12)
POTASSIUM SERPL-SCNC: 4.1 MMOL/L (ref 3.5–5)
RBC # BLD: 4.67 E12/L (ref 3.5–5.5)
SODIUM BLD-SCNC: 129 MMOL/L (ref 132–146)
WBC # BLD: 8.5 E9/L (ref 4.5–11.5)

## 2021-10-04 PROCEDURE — 6360000002 HC RX W HCPCS: Performed by: INTERNAL MEDICINE

## 2021-10-04 PROCEDURE — 82330 ASSAY OF CALCIUM: CPT

## 2021-10-04 PROCEDURE — 99232 SBSQ HOSP IP/OBS MODERATE 35: CPT | Performed by: NEUROLOGICAL SURGERY

## 2021-10-04 PROCEDURE — 36415 COLL VENOUS BLD VENIPUNCTURE: CPT

## 2021-10-04 PROCEDURE — 6370000000 HC RX 637 (ALT 250 FOR IP): Performed by: NURSE PRACTITIONER

## 2021-10-04 PROCEDURE — 97530 THERAPEUTIC ACTIVITIES: CPT | Performed by: PHYSICAL THERAPIST

## 2021-10-04 PROCEDURE — 2580000003 HC RX 258: Performed by: EMERGENCY MEDICINE

## 2021-10-04 PROCEDURE — 6370000000 HC RX 637 (ALT 250 FOR IP): Performed by: SURGERY

## 2021-10-04 PROCEDURE — 83735 ASSAY OF MAGNESIUM: CPT

## 2021-10-04 PROCEDURE — 84100 ASSAY OF PHOSPHORUS: CPT

## 2021-10-04 PROCEDURE — 6370000000 HC RX 637 (ALT 250 FOR IP): Performed by: STUDENT IN AN ORGANIZED HEALTH CARE EDUCATION/TRAINING PROGRAM

## 2021-10-04 PROCEDURE — 6370000000 HC RX 637 (ALT 250 FOR IP): Performed by: NEUROLOGICAL SURGERY

## 2021-10-04 PROCEDURE — 80048 BASIC METABOLIC PNL TOTAL CA: CPT

## 2021-10-04 PROCEDURE — 2580000003 HC RX 258: Performed by: INTERNAL MEDICINE

## 2021-10-04 PROCEDURE — 6360000002 HC RX W HCPCS: Performed by: NEUROLOGICAL SURGERY

## 2021-10-04 PROCEDURE — 2060000000 HC ICU INTERMEDIATE R&B

## 2021-10-04 PROCEDURE — 6370000000 HC RX 637 (ALT 250 FOR IP): Performed by: INTERNAL MEDICINE

## 2021-10-04 PROCEDURE — 97530 THERAPEUTIC ACTIVITIES: CPT

## 2021-10-04 PROCEDURE — 6360000002 HC RX W HCPCS: Performed by: NURSE PRACTITIONER

## 2021-10-04 PROCEDURE — 97535 SELF CARE MNGMENT TRAINING: CPT

## 2021-10-04 PROCEDURE — 85027 COMPLETE CBC AUTOMATED: CPT

## 2021-10-04 RX ORDER — OXYCODONE HYDROCHLORIDE 15 MG/1
7.5 TABLET ORAL EVERY 4 HOURS PRN
Status: DISCONTINUED | OUTPATIENT
Start: 2021-10-04 | End: 2021-10-06 | Stop reason: HOSPADM

## 2021-10-04 RX ORDER — SODIUM CHLORIDE, SODIUM LACTATE, POTASSIUM CHLORIDE, AND CALCIUM CHLORIDE .6; .31; .03; .02 G/100ML; G/100ML; G/100ML; G/100ML
500 INJECTION, SOLUTION INTRAVENOUS ONCE
Status: COMPLETED | OUTPATIENT
Start: 2021-10-04 | End: 2021-10-04

## 2021-10-04 RX ADMIN — HEPARIN SODIUM 5000 UNITS: 10000 INJECTION INTRAVENOUS; SUBCUTANEOUS at 14:10

## 2021-10-04 RX ADMIN — CARVEDILOL 12.5 MG: 6.25 TABLET, FILM COATED ORAL at 08:13

## 2021-10-04 RX ADMIN — CLONIDINE HYDROCHLORIDE 0.1 MG: 0.1 TABLET ORAL at 14:10

## 2021-10-04 RX ADMIN — HEPARIN SODIUM 5000 UNITS: 10000 INJECTION INTRAVENOUS; SUBCUTANEOUS at 06:40

## 2021-10-04 RX ADMIN — OXYCODONE 5 MG: 5 TABLET ORAL at 08:13

## 2021-10-04 RX ADMIN — BUTALBITAL, ACETAMINOPHEN, AND CAFFEINE 1 TABLET: 50; 325; 40 TABLET ORAL at 01:23

## 2021-10-04 RX ADMIN — SODIUM CHLORIDE, POTASSIUM CHLORIDE, SODIUM LACTATE AND CALCIUM CHLORIDE 500 ML: 600; 310; 30; 20 INJECTION, SOLUTION INTRAVENOUS at 10:33

## 2021-10-04 RX ADMIN — OXYCODONE 5 MG: 5 TABLET ORAL at 03:53

## 2021-10-04 RX ADMIN — DOCUSATE SODIUM 50 MG AND SENNOSIDES 8.6 MG 2 TABLET: 8.6; 5 TABLET, FILM COATED ORAL at 08:12

## 2021-10-04 RX ADMIN — NIMODIPINE 60 MG: 30 CAPSULE, LIQUID FILLED ORAL at 00:37

## 2021-10-04 RX ADMIN — CLONIDINE HYDROCHLORIDE 0.1 MG: 0.1 TABLET ORAL at 08:13

## 2021-10-04 RX ADMIN — CLONIDINE HYDROCHLORIDE 0.1 MG: 0.1 TABLET ORAL at 20:32

## 2021-10-04 RX ADMIN — HYDRALAZINE HYDROCHLORIDE 10 MG: 20 INJECTION INTRAMUSCULAR; INTRAVENOUS at 11:09

## 2021-10-04 RX ADMIN — BUTALBITAL, ACETAMINOPHEN, AND CAFFEINE 1 TABLET: 50; 325; 40 TABLET ORAL at 11:09

## 2021-10-04 RX ADMIN — HEPARIN SODIUM 5000 UNITS: 10000 INJECTION INTRAVENOUS; SUBCUTANEOUS at 22:20

## 2021-10-04 RX ADMIN — LEVETIRACETAM 1000 MG: 500 TABLET, FILM COATED ORAL at 20:33

## 2021-10-04 RX ADMIN — NIMODIPINE 60 MG: 30 CAPSULE, LIQUID FILLED ORAL at 06:37

## 2021-10-04 RX ADMIN — METHOCARBAMOL TABLETS 1500 MG: 750 TABLET, COATED ORAL at 17:18

## 2021-10-04 RX ADMIN — METHOCARBAMOL TABLETS 1500 MG: 750 TABLET, COATED ORAL at 04:46

## 2021-10-04 RX ADMIN — Medication 10 ML: at 08:12

## 2021-10-04 RX ADMIN — NIMODIPINE 60 MG: 30 CAPSULE, LIQUID FILLED ORAL at 17:18

## 2021-10-04 RX ADMIN — OXYCODONE HYDROCHLORIDE 7.5 MG: 15 TABLET ORAL at 18:07

## 2021-10-04 RX ADMIN — LEVETIRACETAM 1000 MG: 500 TABLET, FILM COATED ORAL at 08:12

## 2021-10-04 RX ADMIN — TRIMETHOBENZAMIDE HYDROCHLORIDE 200 MG: 100 INJECTION INTRAMUSCULAR at 05:11

## 2021-10-04 RX ADMIN — GABAPENTIN 600 MG: 300 CAPSULE ORAL at 08:12

## 2021-10-04 RX ADMIN — NIMODIPINE 60 MG: 30 CAPSULE, LIQUID FILLED ORAL at 14:10

## 2021-10-04 RX ADMIN — METHOCARBAMOL TABLETS 1500 MG: 750 TABLET, COATED ORAL at 09:31

## 2021-10-04 RX ADMIN — CARVEDILOL 12.5 MG: 6.25 TABLET, FILM COATED ORAL at 17:17

## 2021-10-04 RX ADMIN — HYDRALAZINE HYDROCHLORIDE 10 MG: 20 INJECTION INTRAMUSCULAR; INTRAVENOUS at 05:11

## 2021-10-04 RX ADMIN — POTASSIUM CHLORIDE 20 MEQ: 20 TABLET, EXTENDED RELEASE ORAL at 08:13

## 2021-10-04 RX ADMIN — HYDROMORPHONE HYDROCHLORIDE 1 MG: 1 INJECTION, SOLUTION INTRAMUSCULAR; INTRAVENOUS; SUBCUTANEOUS at 20:34

## 2021-10-04 RX ADMIN — OXYCODONE HYDROCHLORIDE 7.5 MG: 15 TABLET ORAL at 22:20

## 2021-10-04 RX ADMIN — GABAPENTIN 600 MG: 300 CAPSULE ORAL at 20:32

## 2021-10-04 RX ADMIN — POTASSIUM CHLORIDE 20 MEQ: 20 TABLET, EXTENDED RELEASE ORAL at 20:32

## 2021-10-04 RX ADMIN — POTASSIUM CHLORIDE 20 MEQ: 20 TABLET, EXTENDED RELEASE ORAL at 14:10

## 2021-10-04 RX ADMIN — GABAPENTIN 600 MG: 300 CAPSULE ORAL at 14:10

## 2021-10-04 RX ADMIN — Medication 10 ML: at 20:42

## 2021-10-04 RX ADMIN — NIMODIPINE 60 MG: 30 CAPSULE, LIQUID FILLED ORAL at 08:13

## 2021-10-04 RX ADMIN — NIMODIPINE 60 MG: 30 CAPSULE, LIQUID FILLED ORAL at 22:20

## 2021-10-04 RX ADMIN — OXYCODONE HYDROCHLORIDE 7.5 MG: 15 TABLET ORAL at 12:50

## 2021-10-04 ASSESSMENT — PAIN SCALES - GENERAL
PAINLEVEL_OUTOF10: 10
PAINLEVEL_OUTOF10: 6
PAINLEVEL_OUTOF10: 10
PAINLEVEL_OUTOF10: 8

## 2021-10-04 ASSESSMENT — PAIN DESCRIPTION - PAIN TYPE
TYPE: ACUTE PAIN

## 2021-10-04 ASSESSMENT — PAIN DESCRIPTION - LOCATION
LOCATION: BACK
LOCATION: BACK

## 2021-10-04 NOTE — PLAN OF CARE
Notes reviewed  No longer in SICU   No further \"seizures\" have been reported   Awaiting tx to [facility]    Repeat CT Head on 10/3 showed no acute pathology  EEG on 10/1 was normal  TCDs have been stable     Still awaiting second angio  Dr Emely Garcias available today   NS following    Keep SBP less than 130  Continue Nimodipine 60 mg every 4 hours  Continue Keppra 1000 mg twice daily    Neurology will follow peripherally until transfer. Please call with additional questions or concerns.

## 2021-10-04 NOTE — PROGRESS NOTES
Department of Neurosurgery  Progress Note    CHIEF COMPLAINT: SAH    SUBJECTIVE:  No acute events overnight. Patient states she wants to get up with therapy today because she feels it will help her back pain. Patient states her head hurts a little bit but not as bad previously. Denies any new numbness or weakness as well as denies any bowel or bladder incontinence. REVIEW OF SYSTEMS :  Constitutional: Negative for chills and fever. Neurological: Negative for dizziness and tremors, (+) HA and neck pain      OBJECTIVE:   VITALS:  BP (!) 119/96   Pulse 96   Temp 97.8 °F (36.6 °C) (Oral)   Resp 22   Ht 5' 8\" (1.727 m)   Wt (!) 394 lb 10 oz (179 kg)   SpO2 98%   BMI 60.00 kg/m²     PHYSICAL:  Alert, oriented  Appears stated age  PERRL  EOMI  Strength full  Sensation intact to light touch  (-) pronator drift.        DATA:  CBC:   Lab Results   Component Value Date    WBC 8.5 10/04/2021    RBC 4.67 10/04/2021    HGB 12.7 10/04/2021    HCT 38.2 10/04/2021    MCV 81.8 10/04/2021    MCH 27.2 10/04/2021    MCHC 33.2 10/04/2021    RDW 14.1 10/04/2021     10/04/2021    MPV 10.2 10/04/2021     BMP:    Lab Results   Component Value Date     10/04/2021    K 4.1 10/04/2021    K 3.8 06/22/2020    CL 96 10/04/2021    CO2 19 10/04/2021    BUN 14 10/04/2021    LABALBU 4.6 09/28/2021    CREATININE 0.7 10/04/2021    CALCIUM 9.2 10/04/2021    GFRAA >60 10/04/2021    LABGLOM >60 10/04/2021    GLUCOSE 122 10/04/2021     PT/INR:    Lab Results   Component Value Date    PROTIME 11.7 09/27/2021    INR 1.1 09/27/2021     PTT:    Lab Results   Component Value Date    APTT 21.2 09/27/2021   [APTT}    Current Inpatient Medications  Current Facility-Administered Medications: oxyCODONE (OXY-IR) immediate release tablet 7.5 mg, 7.5 mg, Oral, Q4H PRN  butalbital-acetaminophen-caffeine (FIORICET, ESGIC) per tablet 1 tablet, 1 tablet, Oral, BID PRN  carvedilol (COREG) tablet 12.5 mg, 12.5 mg, Oral, BID WC  cloNIDine (CATAPRES) tablet 0.1 mg, 0.1 mg, Oral, TID  methocarbamol (ROBAXIN) tablet 1,500 mg, 1,500 mg, Oral, 4x Daily PRN  heparin (porcine) injection 5,000 Units, 5,000 Units, SubCUTAneous, Q8H  potassium chloride (KLOR-CON M) extended release tablet 20 mEq, 20 mEq, Oral, TID  gabapentin (NEURONTIN) capsule 600 mg, 600 mg, Oral, TID  trimethobenzamide (TIGAN) injection 200 mg, 200 mg, IntraMUSCular, Q6H PRN  levETIRAcetam (KEPPRA) tablet 1,000 mg, 1,000 mg, Oral, BID  niMODipine (NIMOTOP) capsule 60 mg, 60 mg, Oral, 6 times per day  labetalol (NORMODYNE;TRANDATE) injection 10 mg, 10 mg, IntraVENous, Q10 Min PRN  hydrALAZINE (APRESOLINE) injection 10 mg, 10 mg, IntraVENous, Q10 Min PRN  polyethylene glycol (GLYCOLAX) packet 17 g, 17 g, Oral, Daily  sennosides-docusate sodium (SENOKOT-S) 8.6-50 MG tablet 2 tablet, 2 tablet, Oral, Daily  LORazepam (ATIVAN) tablet 0.5 mg, 0.5 mg, Oral, Q4H PRN  sodium chloride flush 0.9 % injection 5-40 mL, 5-40 mL, IntraVENous, 2 times per day  sodium chloride flush 0.9 % injection 5-40 mL, 5-40 mL, IntraVENous, PRN  LORazepam (ATIVAN) injection 2 mg, 2 mg, IntraVENous, Q4H PRN    ASSESSMENT:   - Ifeanyi Oliva 47 y/o female who LP (+) for SAH secondary to non-imaged intracranial aneurysm. PLAN:  -Plan for Angio tomorrow.   -Continue to work with PT  -Patient will need to be transferred to unit facility with neuro ICU capabilities- Patient accepted at [facility] awaiting bed  -Nimodipine for vasospasm prevention.   -Keep SBP <140   -No AP/AC or NSAID medications  -Will need repeat angio in 7-14 days from original.        Electronically signed by Wisam Ortiz on 10/4/2021 at 2:35 PM     Nsx Attending:    Patient was seen and examined by me with the team.  I personally reviewed all pertinent radiological images. I concur with Miss Jesus's clinical assessment and plan. Neurologically stable on my exam and plan as above.     Thank you so much for allowing us to participate in the care of this patient.

## 2021-10-04 NOTE — PROGRESS NOTES
Chief Complaint:  Chief Complaint   Patient presents with    Generalized Body Aches     Pain from head to legs, states tingling in legs. Knee replacement right side in July. Took \" a whole bunch of Oxy 2 nights ago plus drank alcohol\"  Pt diaphoretic upon EMS arrival     [facility] (subarachnoid hemorrhage) ([address]     Subjective:    Still complaining of HA    Still complaining of photo/phonophobia    Pain is still 10/10 despite opioids and numerous pain meds including pretty high doses of barbiturate. No new weakness/numbness    Objective:    BP (!) 119/96   Pulse 96   Temp 97.8 °F (36.6 °C) (Oral)   Resp 22   Ht 5' 8\" (1.727 m)   Wt (!) 394 lb 10 oz (179 kg)   SpO2 98%   BMI 60.00 kg/m²     Current medications that patient is taking have been reviewed.     General appearance: NAD, morbidly obese, nontoxic and comfortable appearing despite continued report of 10/10 pain  HEENT: AT/NC, MMM  Neck: FROM, supple  Lungs: Clear to auscultation anteriorly, WOB normal  CV: RRR, no MRGs  Abdomen: Soft, non-tender; no masses or HSM, +BS  Extremities: No peripheral edema or digital cyanosis  Skin: no rash, lesions or ulcers  Psych: Calm and cooperative  Neuro: Awake, interactive, comfortable appearing, EOMI, face symmetric, handgrips and foot plantar/dorsiflexors 5/5 b/l, speech fl uent      Labs:  CBC with Differential:    Lab Results   Component Value Date    WBC 8.5 10/04/2021    RBC 4.67 10/04/2021    HGB 12.7 10/04/2021    HCT 38.2 10/04/2021     10/04/2021    MCV 81.8 10/04/2021    MCH 27.2 10/04/2021    MCHC 33.2 10/04/2021    RDW 14.1 10/04/2021    LYMPHOPCT 15.5 09/28/2021    MONOPCT 8.9 09/28/2021    BASOPCT 0.6 09/28/2021    MONOSABS 0.97 09/28/2021    LYMPHSABS 1.68 09/28/2021    EOSABS 0.35 09/28/2021    BASOSABS 0.07 09/28/2021     CMP:    Lab Results   Component Value Date     10/04/2021    K 4.1 10/04/2021    K 3.8 06/22/2020    CL 96 10/04/2021    CO2 19 10/04/2021    BUN 14 10/04/2021 CREATININE 0.7 10/04/2021    GFRAA >60 10/04/2021    LABGLOM >60 10/04/2021    GLUCOSE 122 10/04/2021    PROT 7.0 09/28/2021    LABALBU 4.6 09/28/2021    CALCIUM 9.2 10/04/2021    BILITOT 0.8 09/28/2021    ALKPHOS 113 09/28/2021    AST 37 09/28/2021    ALT 38 09/28/2021          Assessment/Plan:  Principal Problem:    SAH (subarachnoid hemorrhage) ([facility])  Active Problems:    Bipolar disorder ([facility] [address]    HTN (hypertension), benign    Mixed hyperlipidemia    Morbid obesity with BMI of 60.0-69.9, adult ([facility])    Lactic acid acidosis    Hypokalemia    Seizure ([facility] [address]  Resolved Problems:    * No resolved hospital problems. *      New CT head personally reviewed. Rads report: \"No acute intracranial abnormality. \"  I question if there's still some blood product visible. Anyways clearly it's not worse. Taper off clonidine    Increase carvedilol or other antihypertensives as needed    Ambulate. PT, OT    I don't think we're going to get her pain to 0/10 or anywhere near that. She's exhibited marked lethargy at times. I will gingerly increase her oxycodone from 5 to 7.5 q4h PRN but I set the expectation with her and her mother that I don't think we will get to pain-free state during this admission. Her mother is asking me to investigate transfer to [facility] now. I have initiated this request through Earlene Yang    In the meantime I spoke with Neurosurgery and Interventional Neurology. Repeat angiogram tomorrow. On her initial angio she was very agitated. Now that she's calmer, it's possible we will have better luck. Continue Keppra    Requires continued inpatient level of care   Time spent: 35 minutes, at least 50% was on face-to-face counseling and coordination of care.    Baron Diana MD    4:02 PM  10/4/2021

## 2021-10-04 NOTE — PROGRESS NOTES
Physical Therapy  Facility/Department: [facility]  Daily Treatment Note  NAME: Joseph Barber  : 1975  MRN: [id number]    Date of Service: 10/4/2021    Evaluating PT:  Jaciel Farfan, PT, DPT  [id number]      Room #:  4206/6674-Y  Diagnosis:  SAH (subarachnoid hemorrhage) ([facility] [I60.9]  PMHx/PSHx:  HLD, HTN, diverticulosis, GERD, knee pain, morbid obesity, sleep apnea, recent R knee replacement   Precautions:  Falls, SBP<140   Equipment Needs:  TBD     SUBJECTIVE:     Pt lives alone (with her dog and 2 cats) in a 2 story condo with 4 stairs to enter and 1 rail. Bedroom and bathroom are on the 2nd level but pt can stay on the 1st if needed. Pt ambulated with no AD PTA. Owns FWW d/t recent knee surgery.       OBJECTIVE:    Initial Evaluation  Date: 21 Treatment  10/4/21 Short Term/ Long Term   Goals   AM-PAC 6 Clicks       Was pt agreeable to Eval/treatment? Yes  yes      Does pt have pain?  9/10 head and neck pain in AM;  5/10 head and neck pain in PM;  10/10 back pain with mobility  Back pain /10 recently medicated      Bed Mobility  Rolling: SBA  Supine to sit: SBA  Sit to supine: SBA  Scooting: SBA Rolling: SBA  Supine to sit: SBA  Sit to supine: NT  Scooting: SBA  Modified Independent     Transfers Sit to stand: Min A  Stand to sit: Min A  Stand pivot: Min A Sit to stand: Min A from EOB, toilet, and chair  Stand to sit: Min A  Stand pivot: Min A with bariatric FWW Modified Independent     Ambulation    5 feet x2 with no AD Min A 20 feet, 10 feet with bariatric FWW with Ezra  >100 feet with AAD Modified Independent     Stair negotiation: ascended and descended  NT  NT >4 steps with 1 rail Modified Independent     ROM BUE:  Per OT eval  BLE:  WFL       Strength BUE:  Per OT eval   BLE:  Grossly 3/5 -- MMT limited d/t back pain        Balance Sitting EOB:  SBA  Dynamic Standing:  Min A Sitting EOB:  SBA  Dynamic Standing:  Min A with bariatric FWW Sitting EOB:  Independent   Dynamic Standing:  Modified Independent        Pt is A & O x 4  Sensation:  Pt denies numbness and tingling to extremities  Edema:  unremarkable    Patient education  Pt educated on importance of continued mobility and time OOB to chair, safety with transfers, ambulation, and bed mobility    Patient response to education:   Pt verbalized understanding Pt demonstrated skill Pt requires further education in this area   yes yes yes     ASSESSMENT:    Comments:  Pt resting semi-supine upon arrival, agreeable to PT eval. Pt completed bed mobility with use of bed rails and no external assist required. Pt required manual assist for lift/lower with sit<>stand transfers from multiple surfaces as noted above in grid. Pt maintains forward flexed posture throughout all standing/amb and required use of FWW for posture/balance assist. Pt amb short distance with seated rest break on toilet, distance limited by fatigue. Pt required balance assist and cues for improved walker use throughout amb. Pt seated in chair with pillow under buttocks for pressure relief and multiple pillows/ice pack behind back for comfort. Pt with all needs in reach upon completion of session. Treatment:  Patient practiced and was instructed in the following treatment:     Bed mobility: cues for sequencing and technique   Transfer training: cues for hand placement, manual assist for lift/lower with sit<>stand from bed, chair, and toilet   Gait training: cues for upright posture and walker management, manual assist for balance and walker stabilization    PLAN:    Patient is making good progress towards established goals. Will continue with current POC.         PLAN:    Time in  1320  Time out  1354    Total Treatment Time  34 min    CPT codes:  [] Gait training 56938 0 minutes  [] Manual therapy 43977 0 minutes  [x] Therapeutic activities 27883 34 minutes  [] Therapeutic exercises 57774 0 minutes  [] Neuromuscular reeducation 59231 0 minutes      Clifford Marinelli PT, DPT  [id number]

## 2021-10-04 NOTE — PROGRESS NOTES
[address]  [address] [place], [place]       Date:10/4/2021                                                               Patient Name: Em Granda  MRN: [id number]  : 1975  Room: [address] [place] [address]    Evaluating OT: Elizabeth Lucas, OTR/L [id number]    Referring Provider: HANG Rios CNS   Specific Provider Orders/Date: OT eval and treat (21)       Diagnosis: [facility]     Reason for admission: generalized body aches (head to legs; tingling in legs)     Pertinent Medical History: HLD, HTN, diverticulosis, GERD, knee pain, morbid obesity, sleep apnea, bipolar, R TKR ()     *Precautions:  Fall Risk, seizures, chronic back pain, SBP<140, impulsive    Assessment of current deficits   [x] Functional mobility  [x]ADLs  [x] Strength               []Cognition   [x] Functional transfers   [x] IADLs         [x] Safety Awareness   [x]Endurance   [] Fine Coordination        [x] ROM     [] Vision/perception   []Sensation    []Gross Motor Coordination [x] Balance   [] Delirium                  []Motor Control     [] Communication    OT PLAN OF CARE   OT POC based on physician orders, patient diagnosis and results of clinical assessment.        Frequency/Duration: 1-3 days/wk for 1-2 weeks PRN    Specific OT Treatment to include:   ADL retraining/adapted techniques and AE recommendations to increase functional independence within precautions                    Energy conservation techniques to improve tolerance for selfcare routine   Functional transfer/mobility training/DME recommendations for increased independence, safety and fall prevention         Patient/family education to increase safety and functional independence within precautions              Environmental modifications for safe mobility and completion of ADL                           Cognitive retraining ex's to improve problem solving skills & safe participation in ADLs/IADLs  Sensory re-education techniques to improve extremity awareness, maintain skin integrity and improve hand functio                             Visual/Perceptual retraining  to improve body awareness and safety during transfers and ADLs  Splinting/positioning needs to maintain joint/skin integrity and prevent contractures  Therapeutic activity to improve functional performance during ADLs/IADLs                                         Therapeutic exercise to improve tolerance and functional strength for ADLs   Balance retraining exercises/tasks for facilitation of postural control with dynamic challenges during ADLs . Positioning to improve functional independence  Neuromuscular re-education: facilitation of righting/equilibrium reactions, normalization muscle tone/facilitation active functional movement                      Delirium prevention/treatmen    Modified Rutherford Scale   Score     Description  0             No symptoms  1             No significant disability despite symptoms  2             Slight disability; able to look after own affairs  3             Moderate disability; able to ambulate without assist/ requires assist with ADLs  4             Moderate/Severe disability;requires assist to ambulate/assist with ADLs  5             Severe disability;bedridden/incontinent   6               Score:   4    Recommended Adaptive Equipment: LB dressing AE as needed for safe reach and energy conservation     Home Living: Pt lives alone; mother can assist upon discharge per pt report. Pt lives  in a 3 level condo with 4 step(s) to enter and no rail(s); bed/bath on 2nd floor: 8 steps/rail. Pt can stay on first floor if needed. Bathroom setup: tub/shower with tub transfer bench s/p knee surgery; 3in1 commode  Equipment owned: transfer bench, [address] [place] [address], [employer], no LB dressing AE    Prior Level of Function: Maggie with ADLs;  Maggie  with IADLs. WW PRN for ambulation.    Driving: yes  Occupation: Life     Pain Level: pt c/o 8/10 low back pain  this session    Cognition: A&O: 4/4    Follows 1-2 step commands appropriately with mod cues for safety. Memory: fair(+)   Comprehension fair(+)   Problem solving: fair(+)   Judgement/safety: fair(-) Impulsive with transfers; cues to slow down for safety and fall prevention. Communication skills: WFL           Vision: WFL  - reports no new vision changes            Glasses:contact lenses                                                   Hearing: WFL         UE Assessment:  Hand Dominance: Right [x]  Left []     ROM Strength   RUE  WFL 4/5   LUE WFL 4/5     Sensation: \"sporatic\" numbness / tingling in all extremities   Tone: WNL   Edema: min B feet     Functional Assessment:  AM-PAC Daily Activity Raw Score: 14/24   Initial Eval Status  Date: 9/30 Treatment Status  Date: STGs = LTGs  Time frame: 7-14 days   Feeding S; set up Set up    Able to feed self seated in chair                        IND   while seated up in chair to increase activity tolerance        Grooming Min A SUP/Set up  Seated    Able to complete tasks after set up seated EOB, declined to attempt to ambulate/stand at sink for ax                         Maggie   while standing sink level requiring no device for balance and demonstrating G tolerance      UB dressing/bathing Mod A NT                       Maggie       LB dressing/bathing Dep Max A    Max A to doff socks seated EOB despite pt ed re; use of adaptive techs                         Maggie   using AE as needed for safe reach/ energy conservation       Toileting NT NT                       Maggie     Bed Mobility  Supine to sit: SBA with HOB elevated    Sit to supine: Min A Rolling to facilitate Functional ax/proper positioning - SUP, Mod VCs for safety    Repositioning:  SUP - able to pull self toward [facility] w/ use of UEs/LEs in supine, VCs for safety    Supine to Sit:  NT  Sit to Supine:   Mod A - Assist to lift Mckinley LEs into bed despite VCs for safe tech                         Maggie  in prep of ADL tasks & transfers   Functional Transfers Sit to stand: Min A    Stand to sit: Min A Sit to stand: Mod A - Chair, Min A EOB    Stand to sit: Min A    Pt ed for safety/hand placement, impulsively returned to seated positioning prior to aligning w/ surface despite VCs                         Maggie  sit<>stand/functional bathroom transfers using AD/DME as needed for balance and safety   Functional Mobility Min A no device  LOB x 1 Min A w/ [employer]    B/t surfaces only and along EOB, pt declined further distance for increased ax                          Maggie   functional/bathroom mobility using AD as needed & demonstrating G safety     Balance Sitting:     Static:  Min A EOB  (transferred to chair for back support. Pt not able to tolerate sitting in chair due to increased back pain. Pt reports she has not been able to take her home medications for days. Nurse notified. Dynamic:Min A  Standing: Min A Sitting:     Static:  IND in armed chair, SUP unsupported at EOB w/ functional ax Min A EOB    Dynamic:  Close SUP w/ functional ax unsupported at EOB    Standing:  Min A w/ use of [employer] Maggie dynamic sitting balance; Maggie dynamic standing balance  during ADL tasks & transfers   Endurance/Activity Tolerance   F tolerance with light activity. Fair    Limited by LBP, fatigue, weakness   G   tolerance with moderate activity/self care routine   Visual/  Perceptual   WFL                       Treatment: OT treatment provided this date includes:  Bed mobility: Instruction on precautions prior to bed mobility to facilitate safe transfers and ADLS. HOB elevated to assist; pt used bed rails. Balance retraining: Performed sitting/standing balance ex's with instruction to facilitate righting reactions with postural changes during ADLS.       Energy conservation: Education on breathing techniques, pacing, work simplification strategies & recommended bathroom DME for safety and energy conservation during self care tasks and activities of daily living. Environmental modifications made prior to and end of session to ensure patient safety and to increase efficiency of session. Skilled monitoring of patient's response to activity performed throughout session. Comments: OK from RN to see patient. Upon arrival, patient found seated in b/s chair. RN present. Pt requesting to return to bed d/t lethargy and LBP. Pt demo fair tolerance with fair understanding of education/techniques. At end of session, patient returned to bed & positioned sidelying on Left Side for comfort with pillows for support. .  Call light within reach, all lines and tubes intact. Pt instructed on use of call light for assistance and fall prevention. .    Patient presents with decreased ROM/strength, activity tolerance, dynamic balance, functional mobility limiting completion of ADLs and safety. Pt can benefit from continued skilled OT to increase safety, functional independence and quality of life. Rehab Potential: good(-) for established goals    Patient / Family Goal: to return mother's home for family assist     Patient and/or family were instructed/educated on diagnosis, prognosis/goals and plan of care. Pt demonstrated F understanding.       Time In:1411             Time Out: 1449        Total Treatment time: 38   Min Units   OT Eval Low 16179     OT Eval Medium 30731     OT Eval High [id number]     OT Re-Eval [id number]     Therapeutic Ex [id number]     Therapeutic Activities 89276 15 1   ADL/Self Care 53532 23 2   Orthotic Management 67352     Neuro Re-Ed 93717     Non-Billable Time        WILNER Regalado, OTR/L  # [id number]

## 2021-10-04 NOTE — CARE COORDINATION
10/4/2021 - admitted for subarachnoid hemorrhage. Neurology and neurosurgery following. PT 15/24, OT 14/24 today. For IR tomorrow for diagnostic angiogram for aneurysmal SAH. Spoke with pt to discuss transition of care. PCP is Dr Juliette Fox. Pharmacy is [facility] in [place]. She lives alone but her plan  is to go to her mother's 3 story condo where her bed/bath would be on the main floor. DME - tub transfer bench, fww, 3 in 1 commode. Hx JULIA at [facility]. Would rather go to her her mother's than go to [facility] when discharged from hospital.  A transfer to [facility] was initiated on 9/29 and pt was accepted. Per [facility], no bed is available at [facility] for pt today. Ambulance form in envelope on soft chart. SW/CM will follow.

## 2021-10-04 NOTE — PROGRESS NOTES
Dr. Paul Garcia notified of patient/family concern of discomfort and constant 10/10 pain. New orders given.

## 2021-10-05 ENCOUNTER — APPOINTMENT (OUTPATIENT)
Dept: INTERVENTIONAL RADIOLOGY/VASCULAR | Age: 46
DRG: 065 | End: 2021-10-05
Payer: COMMERCIAL

## 2021-10-05 LAB
ALBUMIN SERPL-MCNC: 4.4 G/DL (ref 3.5–5.2)
ALBUMIN SERPL-MCNC: 4.6 G/DL (ref 3.5–5.2)
ALP BLD-CCNC: 100 U/L (ref 35–104)
ALP BLD-CCNC: 104 U/L (ref 35–104)
ALT SERPL-CCNC: 39 U/L (ref 0–32)
ALT SERPL-CCNC: 43 U/L (ref 0–32)
ANION GAP SERPL CALCULATED.3IONS-SCNC: 15 MMOL/L (ref 7–16)
ANION GAP SERPL CALCULATED.3IONS-SCNC: 17 MMOL/L (ref 7–16)
AST SERPL-CCNC: 25 U/L (ref 0–31)
AST SERPL-CCNC: 44 U/L (ref 0–31)
BILIRUB SERPL-MCNC: 0.6 MG/DL (ref 0–1.2)
BILIRUB SERPL-MCNC: 0.7 MG/DL (ref 0–1.2)
BUN BLDV-MCNC: 13 MG/DL (ref 6–20)
BUN BLDV-MCNC: 16 MG/DL (ref 6–20)
CALCIUM IONIZED: 0.92 MMOL/L (ref 1.15–1.33)
CALCIUM IONIZED: 1.22 MMOL/L (ref 1.15–1.33)
CALCIUM SERPL-MCNC: 9.6 MG/DL (ref 8.6–10.2)
CALCIUM SERPL-MCNC: 9.8 MG/DL (ref 8.6–10.2)
CHLORIDE BLD-SCNC: 94 MMOL/L (ref 98–107)
CHLORIDE BLD-SCNC: 94 MMOL/L (ref 98–107)
CO2: 14 MMOL/L (ref 22–29)
CO2: 20 MMOL/L (ref 22–29)
CREAT SERPL-MCNC: 0.7 MG/DL (ref 0.5–1)
CREAT SERPL-MCNC: 0.7 MG/DL (ref 0.5–1)
GFR AFRICAN AMERICAN: >60
GFR AFRICAN AMERICAN: >60
GFR NON-AFRICAN AMERICAN: >60 ML/MIN/1.73
GFR NON-AFRICAN AMERICAN: >60 ML/MIN/1.73
GLUCOSE BLD-MCNC: 106 MG/DL (ref 74–99)
GLUCOSE BLD-MCNC: 120 MG/DL (ref 74–99)
HCT VFR BLD CALC: 39.2 % (ref 34–48)
HEMOGLOBIN: 12.8 G/DL (ref 11.5–15.5)
MAGNESIUM: 2.3 MG/DL (ref 1.6–2.6)
MCH RBC QN AUTO: 26.9 PG (ref 26–35)
MCHC RBC AUTO-ENTMCNC: 32.7 % (ref 32–34.5)
MCV RBC AUTO: 82.5 FL (ref 80–99.9)
PDW BLD-RTO: 13.8 FL (ref 11.5–15)
PHOSPHORUS: 4.3 MG/DL (ref 2.5–4.5)
PLATELET # BLD: 293 E9/L (ref 130–450)
PMV BLD AUTO: 10 FL (ref 7–12)
POTASSIUM SERPL-SCNC: 4 MMOL/L (ref 3.5–5)
POTASSIUM SERPL-SCNC: 5.7 MMOL/L (ref 3.5–5)
RBC # BLD: 4.75 E12/L (ref 3.5–5.5)
SODIUM BLD-SCNC: 125 MMOL/L (ref 132–146)
SODIUM BLD-SCNC: 129 MMOL/L (ref 132–146)
TOTAL PROTEIN: 7 G/DL (ref 6.4–8.3)
TOTAL PROTEIN: 7.2 G/DL (ref 6.4–8.3)
WBC # BLD: 7 E9/L (ref 4.5–11.5)

## 2021-10-05 PROCEDURE — B3151ZZ FLUOROSCOPY OF BILATERAL COMMON CAROTID ARTERIES USING LOW OSMOLAR CONTRAST: ICD-10-PCS | Performed by: RADIOLOGY

## 2021-10-05 PROCEDURE — 2580000003 HC RX 258: Performed by: EMERGENCY MEDICINE

## 2021-10-05 PROCEDURE — B31G1ZZ FLUOROSCOPY OF BILATERAL VERTEBRAL ARTERIES USING LOW OSMOLAR CONTRAST: ICD-10-PCS | Performed by: RADIOLOGY

## 2021-10-05 PROCEDURE — 2500000003 HC RX 250 WO HCPCS: Performed by: PSYCHIATRY & NEUROLOGY

## 2021-10-05 PROCEDURE — 36226 PLACE CATH VERTEBRAL ART: CPT | Performed by: PSYCHIATRY & NEUROLOGY

## 2021-10-05 PROCEDURE — 2709999900

## 2021-10-05 PROCEDURE — 2060000000 HC ICU INTERMEDIATE R&B

## 2021-10-05 PROCEDURE — 36223 PLACE CATH CAROTID/INOM ART: CPT

## 2021-10-05 PROCEDURE — 75716 ARTERY X-RAYS ARMS/LEGS: CPT

## 2021-10-05 PROCEDURE — 83735 ASSAY OF MAGNESIUM: CPT

## 2021-10-05 PROCEDURE — 6370000000 HC RX 637 (ALT 250 FOR IP): Performed by: NURSE PRACTITIONER

## 2021-10-05 PROCEDURE — 6370000000 HC RX 637 (ALT 250 FOR IP): Performed by: SURGERY

## 2021-10-05 PROCEDURE — 6360000002 HC RX W HCPCS: Performed by: PSYCHIATRY & NEUROLOGY

## 2021-10-05 PROCEDURE — 6370000000 HC RX 637 (ALT 250 FOR IP): Performed by: PSYCHIATRY & NEUROLOGY

## 2021-10-05 PROCEDURE — 82330 ASSAY OF CALCIUM: CPT

## 2021-10-05 PROCEDURE — 80053 COMPREHEN METABOLIC PANEL: CPT

## 2021-10-05 PROCEDURE — 36223 PLACE CATH CAROTID/INOM ART: CPT | Performed by: PSYCHIATRY & NEUROLOGY

## 2021-10-05 PROCEDURE — 76377 3D RENDER W/INTRP POSTPROCES: CPT | Performed by: PSYCHIATRY & NEUROLOGY

## 2021-10-05 PROCEDURE — 6360000004 HC RX CONTRAST MEDICATION: Performed by: PSYCHIATRY & NEUROLOGY

## 2021-10-05 PROCEDURE — 6360000002 HC RX W HCPCS

## 2021-10-05 PROCEDURE — 6360000002 HC RX W HCPCS: Performed by: INTERNAL MEDICINE

## 2021-10-05 PROCEDURE — 6360000002 HC RX W HCPCS: Performed by: NEUROLOGICAL SURGERY

## 2021-10-05 PROCEDURE — 6370000000 HC RX 637 (ALT 250 FOR IP): Performed by: INTERNAL MEDICINE

## 2021-10-05 PROCEDURE — 2580000003 HC RX 258: Performed by: PSYCHIATRY & NEUROLOGY

## 2021-10-05 PROCEDURE — 6370000000 HC RX 637 (ALT 250 FOR IP): Performed by: STUDENT IN AN ORGANIZED HEALTH CARE EDUCATION/TRAINING PROGRAM

## 2021-10-05 PROCEDURE — 84100 ASSAY OF PHOSPHORUS: CPT

## 2021-10-05 PROCEDURE — 2500000003 HC RX 250 WO HCPCS: Performed by: NURSE PRACTITIONER

## 2021-10-05 PROCEDURE — 6370000000 HC RX 637 (ALT 250 FOR IP): Performed by: NEUROLOGICAL SURGERY

## 2021-10-05 PROCEDURE — 36215 PLACE CATHETER IN ARTERY: CPT

## 2021-10-05 PROCEDURE — 36415 COLL VENOUS BLD VENIPUNCTURE: CPT

## 2021-10-05 PROCEDURE — 85027 COMPLETE CBC AUTOMATED: CPT

## 2021-10-05 PROCEDURE — 36226 PLACE CATH VERTEBRAL ART: CPT

## 2021-10-05 PROCEDURE — 76377 3D RENDER W/INTRP POSTPROCES: CPT

## 2021-10-05 PROCEDURE — 36218 PLACE CATHETER IN ARTERY: CPT

## 2021-10-05 RX ORDER — MIDAZOLAM HYDROCHLORIDE 1 MG/ML
INJECTION INTRAMUSCULAR; INTRAVENOUS
Status: COMPLETED | OUTPATIENT
Start: 2021-10-05 | End: 2021-10-05

## 2021-10-05 RX ORDER — HEPARIN SODIUM 10000 [USP'U]/ML
INJECTION, SOLUTION INTRAVENOUS; SUBCUTANEOUS
Status: COMPLETED | OUTPATIENT
Start: 2021-10-05 | End: 2021-10-05

## 2021-10-05 RX ORDER — SODIUM CHLORIDE 1000 MG
2 TABLET, SOLUBLE MISCELLANEOUS
Status: DISCONTINUED | OUTPATIENT
Start: 2021-10-05 | End: 2021-10-06 | Stop reason: HOSPADM

## 2021-10-05 RX ORDER — FENTANYL CITRATE 50 UG/ML
INJECTION, SOLUTION INTRAMUSCULAR; INTRAVENOUS
Status: COMPLETED | OUTPATIENT
Start: 2021-10-05 | End: 2021-10-05

## 2021-10-05 RX ORDER — LIDOCAINE HYDROCHLORIDE 20 MG/ML
INJECTION, SOLUTION INFILTRATION; PERINEURAL
Status: COMPLETED | OUTPATIENT
Start: 2021-10-05 | End: 2021-10-05

## 2021-10-05 RX ORDER — CARVEDILOL 25 MG/1
25 TABLET ORAL 2 TIMES DAILY WITH MEALS
Status: DISCONTINUED | OUTPATIENT
Start: 2021-10-05 | End: 2021-10-06 | Stop reason: HOSPADM

## 2021-10-05 RX ORDER — LABETALOL HYDROCHLORIDE 5 MG/ML
INJECTION, SOLUTION INTRAVENOUS
Status: COMPLETED | OUTPATIENT
Start: 2021-10-05 | End: 2021-10-05

## 2021-10-05 RX ADMIN — METHOCARBAMOL TABLETS 1500 MG: 750 TABLET, COATED ORAL at 14:18

## 2021-10-05 RX ADMIN — POTASSIUM CHLORIDE 20 MEQ: 20 TABLET, EXTENDED RELEASE ORAL at 22:23

## 2021-10-05 RX ADMIN — DOCUSATE SODIUM 50 MG AND SENNOSIDES 8.6 MG 2 TABLET: 8.6; 5 TABLET, FILM COATED ORAL at 14:17

## 2021-10-05 RX ADMIN — GABAPENTIN 600 MG: 300 CAPSULE ORAL at 21:52

## 2021-10-05 RX ADMIN — OXYCODONE HYDROCHLORIDE 7.5 MG: 15 TABLET ORAL at 14:33

## 2021-10-05 RX ADMIN — CARVEDILOL 25 MG: 25 TABLET, FILM COATED ORAL at 14:17

## 2021-10-05 RX ADMIN — NICARDIPINE HYDROCHLORIDE 50 MG/HR: 2.5 INJECTION, SOLUTION INTRAVENOUS at 09:07

## 2021-10-05 RX ADMIN — Medication 1000 ML: at 10:15

## 2021-10-05 RX ADMIN — Medication 10 ML: at 20:31

## 2021-10-05 RX ADMIN — Medication 2 G: at 18:45

## 2021-10-05 RX ADMIN — IOPAMIDOL 130 ML: 612 INJECTION, SOLUTION INTRAVENOUS at 09:30

## 2021-10-05 RX ADMIN — METHOCARBAMOL TABLETS 1500 MG: 750 TABLET, COATED ORAL at 03:01

## 2021-10-05 RX ADMIN — NIMODIPINE 60 MG: 30 CAPSULE, LIQUID FILLED ORAL at 01:34

## 2021-10-05 RX ADMIN — VERAPAMIL HYDROCHLORIDE: 2.5 INJECTION INTRAVENOUS at 10:00

## 2021-10-05 RX ADMIN — HYDROMORPHONE HYDROCHLORIDE 0.5 MG: 1 INJECTION, SOLUTION INTRAMUSCULAR; INTRAVENOUS; SUBCUTANEOUS at 11:52

## 2021-10-05 RX ADMIN — CLONIDINE HYDROCHLORIDE 0.1 MG: 0.1 TABLET ORAL at 14:17

## 2021-10-05 RX ADMIN — HYDROMORPHONE HYDROCHLORIDE 0.5 MG: 1 INJECTION, SOLUTION INTRAMUSCULAR; INTRAVENOUS; SUBCUTANEOUS at 05:32

## 2021-10-05 RX ADMIN — Medication 5000 UNITS: at 10:15

## 2021-10-05 RX ADMIN — LABETALOL HYDROCHLORIDE 10 MG: 5 INJECTION, SOLUTION INTRAVENOUS at 09:07

## 2021-10-05 RX ADMIN — NIMODIPINE 60 MG: 30 CAPSULE, LIQUID FILLED ORAL at 18:45

## 2021-10-05 RX ADMIN — FENTANYL CITRATE 50 MCG: 50 INJECTION, SOLUTION INTRAMUSCULAR; INTRAVENOUS at 09:15

## 2021-10-05 RX ADMIN — GABAPENTIN 600 MG: 300 CAPSULE ORAL at 14:17

## 2021-10-05 RX ADMIN — POTASSIUM CHLORIDE 20 MEQ: 20 TABLET, EXTENDED RELEASE ORAL at 16:34

## 2021-10-05 RX ADMIN — CLONIDINE HYDROCHLORIDE 0.1 MG: 0.1 TABLET ORAL at 21:52

## 2021-10-05 RX ADMIN — LABETALOL HYDROCHLORIDE 10 MG: 5 INJECTION, SOLUTION INTRAVENOUS at 19:42

## 2021-10-05 RX ADMIN — OXYCODONE HYDROCHLORIDE 7.5 MG: 15 TABLET ORAL at 23:00

## 2021-10-05 RX ADMIN — LEVETIRACETAM 1000 MG: 500 TABLET, FILM COATED ORAL at 21:53

## 2021-10-05 RX ADMIN — POLYETHYLENE GLYCOL 3350 17 G: 17 POWDER, FOR SOLUTION ORAL at 14:18

## 2021-10-05 RX ADMIN — HEPARIN SODIUM 5000 UNITS: 10000 INJECTION INTRAVENOUS; SUBCUTANEOUS at 14:19

## 2021-10-05 RX ADMIN — NIMODIPINE 60 MG: 30 CAPSULE, LIQUID FILLED ORAL at 21:53

## 2021-10-05 RX ADMIN — OXYCODONE HYDROCHLORIDE 7.5 MG: 15 TABLET ORAL at 03:01

## 2021-10-05 RX ADMIN — Medication 2 G: at 14:17

## 2021-10-05 RX ADMIN — MIDAZOLAM 1 MG: 1 INJECTION INTRAMUSCULAR; INTRAVENOUS at 09:07

## 2021-10-05 RX ADMIN — FENTANYL CITRATE 50 MCG: 50 INJECTION, SOLUTION INTRAMUSCULAR; INTRAVENOUS at 09:07

## 2021-10-05 RX ADMIN — LEVETIRACETAM 1000 MG: 500 TABLET, FILM COATED ORAL at 14:16

## 2021-10-05 RX ADMIN — LORAZEPAM 0.5 MG: 1 TABLET ORAL at 21:53

## 2021-10-05 RX ADMIN — BUTALBITAL, ACETAMINOPHEN, AND CAFFEINE 1 TABLET: 50; 325; 40 TABLET ORAL at 01:34

## 2021-10-05 RX ADMIN — HYDROMORPHONE HYDROCHLORIDE 0.5 MG: 1 INJECTION, SOLUTION INTRAMUSCULAR; INTRAVENOUS; SUBCUTANEOUS at 01:34

## 2021-10-05 RX ADMIN — IOPAMIDOL 10 ML: 755 INJECTION, SOLUTION INTRAVENOUS at 09:30

## 2021-10-05 RX ADMIN — NIMODIPINE 60 MG: 30 CAPSULE, LIQUID FILLED ORAL at 14:18

## 2021-10-05 RX ADMIN — HEPARIN SODIUM 5000 UNITS: 10000 INJECTION INTRAVENOUS; SUBCUTANEOUS at 21:53

## 2021-10-05 RX ADMIN — LIDOCAINE HYDROCHLORIDE 4 ML: 20 INJECTION, SOLUTION INFILTRATION; PERINEURAL at 09:09

## 2021-10-05 RX ADMIN — HYDROMORPHONE HYDROCHLORIDE 0.5 MG: 1 INJECTION, SOLUTION INTRAMUSCULAR; INTRAVENOUS; SUBCUTANEOUS at 16:34

## 2021-10-05 RX ADMIN — NIMODIPINE 60 MG: 30 CAPSULE, LIQUID FILLED ORAL at 05:25

## 2021-10-05 RX ADMIN — OXYCODONE HYDROCHLORIDE 7.5 MG: 15 TABLET ORAL at 18:44

## 2021-10-05 RX ADMIN — HYDROMORPHONE HYDROCHLORIDE 0.5 MG: 1 INJECTION, SOLUTION INTRAMUSCULAR; INTRAVENOUS; SUBCUTANEOUS at 20:30

## 2021-10-05 RX ADMIN — HEPARIN SODIUM 5000 UNITS: 10000 INJECTION INTRAVENOUS; SUBCUTANEOUS at 05:33

## 2021-10-05 ASSESSMENT — PAIN SCALES - GENERAL
PAINLEVEL_OUTOF10: 10
PAINLEVEL_OUTOF10: 8
PAINLEVEL_OUTOF10: 7
PAINLEVEL_OUTOF10: 10
PAINLEVEL_OUTOF10: 7
PAINLEVEL_OUTOF10: 10

## 2021-10-05 ASSESSMENT — PAIN DESCRIPTION - ORIENTATION: ORIENTATION: LOWER

## 2021-10-05 ASSESSMENT — PAIN DESCRIPTION - PAIN TYPE
TYPE: ACUTE PAIN
TYPE: ACUTE PAIN

## 2021-10-05 ASSESSMENT — PAIN DESCRIPTION - LOCATION
LOCATION: BACK
LOCATION: BACK;HEAD

## 2021-10-05 ASSESSMENT — PAIN DESCRIPTION - DESCRIPTORS: DESCRIPTORS: ACHING;THROBBING

## 2021-10-05 NOTE — PROGRESS NOTES
Report called to [employer], TR band removed, Dressing dry and intact. 1950 ml removed from pitts. Patient experiencing pain but from her back spasms. She was medicated for pain.   VSS  All questions answered, no further questions at this time

## 2021-10-05 NOTE — CONSULTS
Attempted to see patient, patient out of room in imaging.  Will attempt to see patient at another time

## 2021-10-05 NOTE — PROGRESS NOTES
Chief Complaint:  Chief Complaint   Patient presents with    Generalized Body Aches     Pain from head to legs, states tingling in legs. Knee replacement right side in July. Took \" a whole bunch of Oxy 2 nights ago plus drank alcohol\"  Pt diaphoretic upon EMS arrival     [address] (subarachnoid hemorrhage) (HCC)     Subjective: Today she complains more of back pain rather than HA. She reports that when EMS brought her in, her back was bumped against the stairs as they took her downstairs. No leg weakness/numbness. HA better. Still some photophobia. Objective:    BP (!) 161/95   Pulse 80   Temp 97.7 °F (36.5 °C) (Tympanic)   Resp 18   Ht 5' 8\" (1.727 m)   Wt (!) 394 lb 10 oz (179 kg)   SpO2 96%   BMI 60.00 kg/m²     Current medications that patient is taking have been reviewed.     General appearance: NAD, morbidly obese, nontoxic  HEENT: AT/NC, MMM  Neck: FROM, supple  Lungs: Clear to auscultation anteriorly, WOB normal  CV: RRR, no MRGs  Abdomen: Soft, non-tender; no masses or HSM, +BS  Extremities: No peripheral edema or digital cyanosis  Back: no midline or paraverterbral tenderness though quite limited due to habitus  Skin: no rash, lesions or ulcers  Psych: Calm and cooperative  Neuro: Awake, interactive, comfortable appearing, EOMI, face symmetric, handgrips and foot plantar/dorsiflexors 5/5 b/l, speech fluent      Labs:  CBC with Differential:    Lab Results   Component Value Date    WBC 8.5 10/04/2021    RBC 4.67 10/04/2021    HGB 12.7 10/04/2021    HCT 38.2 10/04/2021     10/04/2021    MCV 81.8 10/04/2021    MCH 27.2 10/04/2021    MCHC 33.2 10/04/2021    RDW 14.1 10/04/2021    LYMPHOPCT 15.5 09/28/2021    MONOPCT 8.9 09/28/2021    BASOPCT 0.6 09/28/2021    MONOSABS 0.97 09/28/2021    LYMPHSABS 1.68 09/28/2021    EOSABS 0.35 09/28/2021    BASOSABS 0.07 09/28/2021     CMP:    Lab Results   Component Value Date     10/05/2021    K 5.7 10/05/2021    K 3.8 06/22/2020    CL 94 10/05/2021 CO2 14 10/05/2021    BUN 16 10/05/2021    CREATININE 0.7 10/05/2021    GFRAA >60 10/05/2021    LABGLOM >60 10/05/2021    GLUCOSE 106 10/05/2021    PROT 7.2 10/05/2021    LABALBU 4.4 10/05/2021    CALCIUM 9.8 10/05/2021    BILITOT 0.7 10/05/2021    ALKPHOS 104 10/05/2021    AST 44 10/05/2021    ALT 39 10/05/2021          Assessment/Plan:  Principal Problem:    SAH (subarachnoid hemorrhage) (HCC)  Active Problems:    Bipolar disorder ([facility]    HTN (hypertension), benign    Mixed hyperlipidemia    Morbid obesity with BMI of 60.0-69.9, adult (HCC)    Lactic acid acidosis    Hypokalemia    Seizure ([facility]  Resolved Problems:    * No resolved hospital problems. *      Repeat angio done today, case d/w Dr. Yesenia Christensen - no aneurysm. He feels this may have been a hypertensive bleed    Increase Coreg due to BP's creeping up    Will do CT T/L spine to r/o fracture    Labs today look deranged but I think it's a lab error. New hyponatremia, hyperkalemia, acidosis, hypocalcemia. Recheck stat. D/w RN.     Requires continued inpatient level of care     Sarita Gann MD    1:22 PM  10/5/2021

## 2021-10-05 NOTE — PROGRESS NOTES
1215: 2 mL removed from TR band. 12 mL remaining. 1220: No air in TR band. TR band removed. No bleeding noted. Patient to be placed for transport back to room. Patient c/o of back pain which is chronic.

## 2021-10-05 NOTE — PROGRESS NOTES
Neurointerventional POST Procedure Note      Date of Service: 10/05/21      Patient Name: Sonja Raymundo   : 1975  Medical record number:  [id number]        Procedure: Diagnostic Cerebral angiogram , Cervical spinal angiogram  , Angiogram of bilateral thyrocervical trunks  Physician: Americo Rueda MD  Assistant: Real Malone RT  Access:Right Radial   Vessels injected:   Right Vertebral artery  Right thyrocervical trunk  Right Common Carotid artery  Left Common Carotid artery  Left subclavian artery  Left thyrocervical artery  Hemostasis: TR band 16 ml of air  Anesthesia: Please see flowchart  Specimens: None  Blood loss: 40 ml   Contrast Material:  please see dictation in PACS  Fluoro time: Please see dictation in PACS      Pre procedural events  SBP >230 on arival to IR  Labetolol 10 mg given and patient placed on Nicardipine drip while in the procedure  NA is 125- started patient on salt tabs 2 g tid, IV  0.9 NS Started in IR  Pain control - patient controlled to 5/10 with versed and fentanyl    Estrella procedural   Patient complains of inability to use bed pan- Watson placed and will be removed after procedure  Pain control to 0/10 achieved with patient snoring during procedure  Supplemental 02 due to possible obesity hypoventilation syndrom    Post procedural - neurological exam stable, is not crying or covering her eyes        PRELIM report  Diagnosis/Findings:   1. No aneursym AVM or AVF identified  2. Patulous basilar tip which is  a normal anatomical variant.   3. Right Vertebral artery dominant, primary supply of the basilar artery/posterior circulation    Plan:    Most likely a perimesencephalic venous bleed etiology HTN  No neuro intervention is required  Hold on fiorecet for rebound headaches  Can consider switch of keppra to depakote 500mg bid - may help with seizure prophylaxis and headaches    Neurointervention will sign off    Discussed with

## 2021-10-05 NOTE — PROGRESS NOTES
[facility] not taking transfers    Nurse call me that patient yelling in 10/10 pain with her family in the room and family is really concerned. I really don't understand why, as her CT is improving and she appeared very comfortable on my visit today laying quietly in bed. I think her pain issues are more complex than just the [facility]    Updated her mother in detail    Will order small dose of dilaudid - but watch out for lethargy as she is a relatively high risk patient with her BMI 60. I doubt we will be able to give her enough pain meds to make her pain \"0\" which her mom had a hard time processing.     Consult pain management service

## 2021-10-05 NOTE — CARE COORDINATION
10/5/2021 - Neurology and neurosurgery following. Pain management consulted. Went to IR today for diagnostic cerebral angiogram. CT of thoracic and lumbar spine ordered. PT 15/24, OT 14/24 yesterday. Pt has a transfer to [facility] through the [facility]. Per [facility], no bed available today. Ambulance form in envelope on soft chart. SW/CM will follow.

## 2021-10-05 NOTE — PROGRESS NOTES
Neurointervention Pre procedure Note      Medical record number:  [id number]      Procedure: Diagnostic Cerebral Angiogram     Cervical spinal angiogram , angiogram of bilateral thyrocervical trunks    Indications:  Perimedullary SAH  Labs: The patient's record including history and physical, available labs and procedures, medications and allergies has been reviewed. PRE-PROCEDURE ATTESTATION STATEMENT  I have explained the potential risks, benefits, and side effects of the proposed procedure/treatment, including the risk of death to the patient and/or surrogate. Also, the possibility for the transfusion of blood or blood components (only if potential for transfusion is applicable) was discussed with risks, benefits, and alternatives. This explanation included discussion of the likelihood of the patient achieving his or her goals and any potential problems that might occur during recuperation. Reasonable alternatives to the proposed procedure/treatment including risks, benefits, and side effects were also discussed, as were the risks related to not receiving the proposed procedure/treatment. The patient and/or surrogate have elected to proceed with the proposed procedure/treatment. .      Sedation and/or anesthesia risk, benefits, and alternatives discussed and questions answered. Vital Signs:  Vitals:    10/04/21 1700 10/04/21 2038 10/05/21 0134 10/05/21 0819   BP: (!) 147/84 (!) 148/87 (!) 153/96 (!) 156/88   Pulse:  86 76 78   Resp: 20 21 18 18   Temp: 98.2 °F (36.8 °C) 98.5 °F (36.9 °C) 98.3 °F (36.8 °C) 97.7 °F (36.5 °C)   TempSrc: Oral Oral Oral Tympanic   SpO2: 96% 98% 100% 98%   Weight:       Height:             GENERAL: NPO: YES  ASA: ASA 2 - Patient with mild systemic disease with no functional limitations  MALLAMPATI: III (soft palate, base of uvula visible)    Anesthesia Plan:  Plan for conscious sedation.

## 2021-10-06 VITALS
BODY MASS INDEX: 44.41 KG/M2 | RESPIRATION RATE: 22 BRPM | WEIGHT: 293 LBS | SYSTOLIC BLOOD PRESSURE: 132 MMHG | DIASTOLIC BLOOD PRESSURE: 85 MMHG | OXYGEN SATURATION: 95 % | HEIGHT: 68 IN | HEART RATE: 78 BPM | TEMPERATURE: 97.9 F

## 2021-10-06 PROBLEM — M54.9 BACK PAIN: Status: ACTIVE | Noted: 2021-10-06

## 2021-10-06 LAB
ALBUMIN SERPL-MCNC: 4.6 G/DL (ref 3.5–5.2)
ALP BLD-CCNC: 94 U/L (ref 35–104)
ALT SERPL-CCNC: 36 U/L (ref 0–32)
ANION GAP SERPL CALCULATED.3IONS-SCNC: 15 MMOL/L (ref 7–16)
AST SERPL-CCNC: 17 U/L (ref 0–31)
BILIRUB SERPL-MCNC: 0.5 MG/DL (ref 0–1.2)
BUN BLDV-MCNC: 17 MG/DL (ref 6–20)
CALCIUM IONIZED: 1.26 MMOL/L (ref 1.15–1.33)
CALCIUM SERPL-MCNC: 9.2 MG/DL (ref 8.6–10.2)
CHLORIDE BLD-SCNC: 96 MMOL/L (ref 98–107)
CO2: 17 MMOL/L (ref 22–29)
CREAT SERPL-MCNC: 0.8 MG/DL (ref 0.5–1)
CSF CULTURE: NORMAL
GFR AFRICAN AMERICAN: >60
GFR NON-AFRICAN AMERICAN: >60 ML/MIN/1.73
GLUCOSE BLD-MCNC: 111 MG/DL (ref 74–99)
GRAM STAIN RESULT: NORMAL
HCT VFR BLD CALC: 35.6 % (ref 34–48)
HEMOGLOBIN: 11.6 G/DL (ref 11.5–15.5)
MAGNESIUM: 2.1 MG/DL (ref 1.6–2.6)
MCH RBC QN AUTO: 26.9 PG (ref 26–35)
MCHC RBC AUTO-ENTMCNC: 32.6 % (ref 32–34.5)
MCV RBC AUTO: 82.6 FL (ref 80–99.9)
PDW BLD-RTO: 13.7 FL (ref 11.5–15)
PHOSPHORUS: 3.7 MG/DL (ref 2.5–4.5)
PLATELET # BLD: 249 E9/L (ref 130–450)
PMV BLD AUTO: 10.1 FL (ref 7–12)
POTASSIUM SERPL-SCNC: 3.9 MMOL/L (ref 3.5–5)
RBC # BLD: 4.31 E12/L (ref 3.5–5.5)
SODIUM BLD-SCNC: 128 MMOL/L (ref 132–146)
TOTAL PROTEIN: 7.2 G/DL (ref 6.4–8.3)
WBC # BLD: 8.3 E9/L (ref 4.5–11.5)

## 2021-10-06 PROCEDURE — 6370000000 HC RX 637 (ALT 250 FOR IP): Performed by: NURSE PRACTITIONER

## 2021-10-06 PROCEDURE — 83735 ASSAY OF MAGNESIUM: CPT

## 2021-10-06 PROCEDURE — 80053 COMPREHEN METABOLIC PANEL: CPT

## 2021-10-06 PROCEDURE — 6370000000 HC RX 637 (ALT 250 FOR IP): Performed by: PSYCHIATRY & NEUROLOGY

## 2021-10-06 PROCEDURE — 6370000000 HC RX 637 (ALT 250 FOR IP): Performed by: STUDENT IN AN ORGANIZED HEALTH CARE EDUCATION/TRAINING PROGRAM

## 2021-10-06 PROCEDURE — 36415 COLL VENOUS BLD VENIPUNCTURE: CPT

## 2021-10-06 PROCEDURE — 6370000000 HC RX 637 (ALT 250 FOR IP): Performed by: NEUROLOGICAL SURGERY

## 2021-10-06 PROCEDURE — 2500000003 HC RX 250 WO HCPCS: Performed by: NURSE PRACTITIONER

## 2021-10-06 PROCEDURE — 6360000002 HC RX W HCPCS: Performed by: INTERNAL MEDICINE

## 2021-10-06 PROCEDURE — 2580000003 HC RX 258: Performed by: EMERGENCY MEDICINE

## 2021-10-06 PROCEDURE — 6370000000 HC RX 637 (ALT 250 FOR IP): Performed by: SURGERY

## 2021-10-06 PROCEDURE — 6370000000 HC RX 637 (ALT 250 FOR IP): Performed by: INTERNAL MEDICINE

## 2021-10-06 PROCEDURE — 6360000002 HC RX W HCPCS: Performed by: NEUROLOGICAL SURGERY

## 2021-10-06 PROCEDURE — 85027 COMPLETE CBC AUTOMATED: CPT

## 2021-10-06 PROCEDURE — 82330 ASSAY OF CALCIUM: CPT

## 2021-10-06 PROCEDURE — 84100 ASSAY OF PHOSPHORUS: CPT

## 2021-10-06 RX ADMIN — HYDROMORPHONE HYDROCHLORIDE 0.5 MG: 1 INJECTION, SOLUTION INTRAMUSCULAR; INTRAVENOUS; SUBCUTANEOUS at 11:38

## 2021-10-06 RX ADMIN — HEPARIN SODIUM 5000 UNITS: 10000 INJECTION INTRAVENOUS; SUBCUTANEOUS at 05:55

## 2021-10-06 RX ADMIN — METHOCARBAMOL TABLETS 1500 MG: 750 TABLET, COATED ORAL at 07:26

## 2021-10-06 RX ADMIN — HYDROMORPHONE HYDROCHLORIDE 0.5 MG: 1 INJECTION, SOLUTION INTRAMUSCULAR; INTRAVENOUS; SUBCUTANEOUS at 00:31

## 2021-10-06 RX ADMIN — CARVEDILOL 25 MG: 25 TABLET, FILM COATED ORAL at 07:45

## 2021-10-06 RX ADMIN — GABAPENTIN 600 MG: 300 CAPSULE ORAL at 08:02

## 2021-10-06 RX ADMIN — LEVETIRACETAM 1000 MG: 500 TABLET, FILM COATED ORAL at 08:02

## 2021-10-06 RX ADMIN — SODIUM CHLORIDE, PRESERVATIVE FREE 10 ML: 5 INJECTION INTRAVENOUS at 09:23

## 2021-10-06 RX ADMIN — HYDROMORPHONE HYDROCHLORIDE 0.5 MG: 1 INJECTION, SOLUTION INTRAMUSCULAR; INTRAVENOUS; SUBCUTANEOUS at 09:23

## 2021-10-06 RX ADMIN — Medication 2 G: at 07:45

## 2021-10-06 RX ADMIN — NIMODIPINE 60 MG: 30 CAPSULE, LIQUID FILLED ORAL at 09:23

## 2021-10-06 RX ADMIN — HYDROMORPHONE HYDROCHLORIDE 0.5 MG: 1 INJECTION, SOLUTION INTRAMUSCULAR; INTRAVENOUS; SUBCUTANEOUS at 05:16

## 2021-10-06 RX ADMIN — DOCUSATE SODIUM 50 MG AND SENNOSIDES 8.6 MG 2 TABLET: 8.6; 5 TABLET, FILM COATED ORAL at 08:01

## 2021-10-06 RX ADMIN — OXYCODONE HYDROCHLORIDE 7.5 MG: 15 TABLET ORAL at 07:27

## 2021-10-06 RX ADMIN — CLONIDINE HYDROCHLORIDE 0.1 MG: 0.1 TABLET ORAL at 08:02

## 2021-10-06 RX ADMIN — NIMODIPINE 60 MG: 30 CAPSULE, LIQUID FILLED ORAL at 02:24

## 2021-10-06 RX ADMIN — LABETALOL HYDROCHLORIDE 10 MG: 5 INJECTION, SOLUTION INTRAVENOUS at 05:56

## 2021-10-06 RX ADMIN — Medication 10 ML: at 08:02

## 2021-10-06 RX ADMIN — POTASSIUM CHLORIDE 20 MEQ: 20 TABLET, EXTENDED RELEASE ORAL at 09:23

## 2021-10-06 RX ADMIN — NIMODIPINE 60 MG: 30 CAPSULE, LIQUID FILLED ORAL at 05:40

## 2021-10-06 RX ADMIN — OXYCODONE HYDROCHLORIDE 7.5 MG: 15 TABLET ORAL at 03:03

## 2021-10-06 ASSESSMENT — PAIN SCALES - GENERAL
PAINLEVEL_OUTOF10: 10

## 2021-10-06 NOTE — PROGRESS NOTES
Access center returned call, [facility] able to transport in about 3-4 hr wait, chart copied and in envelope, awaiting for Dr Oscar Schneider orders.

## 2021-10-06 NOTE — PROGRESS NOTES
access center updated on patient's condition. Access center would like to discuss with primary doctor and/or neuro to see if the N-ICU bed requested at [facility] was still needed.

## 2021-10-06 NOTE — DISCHARGE INSTR - COC
Continuity of Care Form    Patient Name: Penelope Arevalo   :  1975  MRN:  [id number]    Admit date:  2021  Discharge date:  10/0/21    Code Status Order: Prior   Advance Directives:     Admitting Physician:  Nati Levy MD  PCP: Eric Zheng DO    Discharging Nurse: [facility] Unit/Room#: 6707/2818-E  Discharging Unit Phone Number: [phone]    Emergency Contact:   Extended Emergency Contact Information  Primary Emergency Contact: Karina Oliva  [address] Phone: [phone]  Mobile Phone: [phone]  Relation: Parent  Secondary Emergency Contact: Darrel Lamar  Mobile Phone: [phone]  Relation: Brother/Sister    Past Surgical History:  Past Surgical History:   Procedure Laterality Date    COLONOSCOPY      COLONOSCOPY N/A 2019    COLONOSCOPY DIAGNOSTIC performed by Saroj Rojo MD at [address] [facility] COLONOSCOPY  2019    HYSTERECTOMY         Immunization History:   Immunization History   Administered Date(s) Administered    Influenza, Quadv, 6 mo and older, IM, PF (Flulaval, Fluarix) 10/02/2018    Tdap (Boostrix, Adacel) 2015       Active Problems:  Patient Active Problem List   Diagnosis Code    Morbid obesity with BMI of 50.0-59.9, adult ([facility] E66.01, Z68.43    Bipolar disorder ([facility] F31.9    HTN (hypertension), benign I10    Mixed hyperlipidemia E78.2    Acute diverticulitis K57.92    Joint pain M25.50    Knee pain M25.569    Morbid obesity with BMI of 60.0-69.9, adult ([facility] E66.01, Z68.44    Diverticulosis K57.90    SAH (subarachnoid hemorrhage) (HCC) I60.9    Lactic acid acidosis E87.2    Hypokalemia E87.6    Seizure ([facility] R56.9    Back pain M54.9       Isolation/Infection:   Isolation          No Isolation        Patient Infection Status     Infection Onset Added Last Indicated Last Indicated By Review Planned Expiration Resolved Resolved By    None active    Resolved    COVID-19 Rule Out 09/27/21 09/27/21 09/27/21 COVID-19 & Influenza Combo (Ordered)   09/27/21 Rule-Out Test Resulted          Nurse Assessment:  Last Vital Signs: BP (!) 148/91   Pulse 69   Temp 97.9 °F (36.6 °C) (Oral)   Resp 20   Ht 5' 8\" (1.727 m)   Wt (!) 394 lb 10 oz (179 kg)   SpO2 99%   BMI 60.00 kg/m²     Last documented pain score (0-10 scale): Pain Level: 10  Last Weight:   Wt Readings from Last 1 Encounters:   09/28/21 (!) 394 lb 10 oz (179 kg)     Mental Status:  oriented    IV Access:  - Peripheral IV - site  R Upper Arm, insertion date: 10/03/21    Nursing Mobility/ADLs:  Walking   Assisted  Transfer  Assisted  Bathing  Assisted  Dressing  Assisted  Toileting  Assisted  Feeding  Independent  Med Admin  Assisted  Med Delivery   whole    Wound Care Documentation and Therapy:  Wound 09/28/21 Buttocks Left; Inner (Active)   Dressing/Treatment Open to air 10/05/21 2215   Wound Length (cm) 0.04 cm 09/28/21 1230   Wound Width (cm) 0.04 cm 09/28/21 1230   Wound Surface Area (cm^2) 0.0016 cm^2 09/28/21 1230   Wound Assessment Pink/red 10/05/21 2215   Drainage Amount None 10/05/21 2215   Odor None 10/05/21 2215   Number of days: 7        Elimination:  Continence:   · Bowel: Yes  · Bladder: Yes  Urinary Catheter: Insertion Date: 09/29/21   Colostomy/Ileostomy/Ileal Conduit: No       Date of Last BM: unknown    Intake/Output Summary (Last 24 hours) at 10/6/2021 0828  Last data filed at 10/6/2021 0641  Gross per 24 hour   Intake 130 ml   Output 2850 ml   Net -2720 ml     I/O last 3 completed shifts: In: 130 [P.O.:120; I.V.:10]  Out: 2850 [Urine:2850]    Safety Concerns: At Risk for Falls and History of Seizures    Impairments/Disabilities:      None    Nutrition Therapy:  Current Nutrition Therapy:   - Oral Diet:  General    Routes of Feeding: Oral  Liquids:  Thin Liquids  Daily Fluid Restriction: no  Last Modified Barium Swallow with Video (Video Swallowing Test): not done    Treatments at the Time of Hospital Discharge:   Respiratory Treatments: NA  Oxygen Therapy:  is not on home oxygen therapy. Ventilator:    - No ventilator support    Rehab Therapies: Physical Therapy and Occupational Therapy  Weight Bearing Status/Restrictions: No weight bearing restirctions  Other Medical Equipment (for information only, NOT a DME order):  walker, bedside commode and hospital bed  Other Treatments: NA    Patient's personal belongings (please select all that are sent with patient):  Glasses, phone    RN SIGNATURE: Renae Fish  CASE MANAGEMENT/SOCIAL WORK SECTION    Inpatient Status Date: ***    Readmission Risk Assessment Score:  Readmission Risk              Risk of Unplanned Readmission:  15           Discharging to Facility/ Agency   · Name:   · Address:  · Phone:  · Fax:    Dialysis Facility (if applicable)   · Name:  · Address:  · Dialysis Schedule:  · Phone:  · Fax:    / signature: {Esignature:[id number]}    PHYSICIAN SECTION    Prognosis: {Prognosis:5349237546}    Condition at Discharge: [address] [place] [address] Patient Condition:[id number]}    Rehab Potential (if transferring to Rehab): {Prognosis:1974744079}    Recommended Labs or Other Treatments After Discharge: ***    Physician Certification: I certify the above information and transfer of Miriam Andrade  is necessary for the continuing treatment of the diagnosis listed and that she requires {Admit to Appropriate Level of Care:31906} for {GREATER/LESS:227026288} 30 days.      Update Admission H&P: [facility]}    PHYSICIAN SIGNATURE:  {Esignature:[id number]}

## 2021-10-06 NOTE — PROGRESS NOTES
Patient currently refusing to go to CT scan at this time due to pain. Will attempt again after pain medication administered.

## 2021-10-06 NOTE — CONSULTS
[facility]) Pain Management  IP CONSULT NOTE       Patient: Luciana Neal  : 1975  Date of Admission: 2021  2:29 AM  Date of Service: 10/5/21  Reason for Consult: generalized body and low back pain      Chief Complaint   Patient presents with    Generalized Body Aches     Pain from head to legs, states tingling in legs. Knee replacement right side in July. Took \" a whole bunch of Oxy 2 nights ago plus drank alcohol\"  Pt diaphoretic upon EMS arrival     HISTORY OF PRESENT ILLNESS:    Luciana Neal is a 48 y.o. female with a pertinent history of alcohol abuse, obesity, bipolar, htn and seizures. Pt presented to the emergency department on 21 for complaints of generalized body aches and acute onset headache accompanied by neck stiffness and photophobia. Pt main complaint is today is low back pain and headaches. Pt reports when being transported by EMS her back hit the stairs and is the reason for her acute low back pain. Pt reports low back pain is primarily axial with radiation to bilateral glutes. Pain does not radiate down legs. Pain is constant and worse with movement and improved with rest. Pt reports medication regimen that she is on now is currently controlling her pain at this time. Pt currently managed by Dr Paty Fair at [facility] with Percocet 5/325mg q 4 hours prn acute knee pain s/p knee replacement done 2021. Pt has a past medial and surgical history that can be reviewed below.              Past Medical History:       Diagnosis Date    Diverticulosis     GERD without esophagitis     Hyperlipidemia     Hypertension     Knee pain     Morbid obesity due to excess calories ([address]     Obesity     Sleep apnea     CPAP setting 11        Past Surgical History:       Procedure Laterality Date    COLONOSCOPY      COLONOSCOPY N/A 2019    COLONOSCOPY DIAGNOSTIC performed by Jossy Tarango MD at [address] [facility] COLONOSCOPY  2019    HYSTERECTOMY ALLERGIES:  Allergies   Allergen Reactions    No Known Allergies      LABS:   Recent Results (from the past 72 hour(s))   CBC    Collection Time: 10/04/21  7:03 AM   Result Value Ref Range    WBC 8.5 4.5 - 11.5 E9/L    RBC 4.67 3.50 - 5.50 E12/L    Hemoglobin 12.7 11.5 - 15.5 g/dL    Hematocrit 38.2 34.0 - 48.0 %    MCV 81.8 80.0 - 99.9 fL    MCH 27.2 26.0 - 35.0 pg    MCHC 33.2 32.0 - 34.5 %    RDW 14.1 11.5 - 15.0 fL    Platelets 672 975 - 925 E9/L    MPV 10.2 7.0 - 12.0 fL   Magnesium    Collection Time: 10/04/21  7:03 AM   Result Value Ref Range    Magnesium 2.2 1.6 - 2.6 mg/dL   Phosphorus    Collection Time: 10/04/21  7:03 AM   Result Value Ref Range    Phosphorus 3.6 2.5 - 4.5 mg/dL   Calcium, ionized    Collection Time: 10/04/21  7:03 AM   Result Value Ref Range    Calcium, Ion 1.26 1.15 - 1.33 mmol/L   Basic metabolic panel    Collection Time: 10/04/21  7:03 AM   Result Value Ref Range    Sodium 129 (L) 132 - 146 mmol/L    Potassium 4.1 3.5 - 5.0 mmol/L    Chloride 96 (L) 98 - 107 mmol/L    CO2 19 (L) 22 - 29 mmol/L    Anion Gap 14 7 - 16 mmol/L    Glucose 122 (H) 74 - 99 mg/dL    BUN 14 6 - 20 mg/dL    CREATININE 0.7 0.5 - 1.0 mg/dL    GFR Non-African American >60 >=60 mL/min/1.73    GFR African American >60     Calcium 9.2 8.6 - 10.2 mg/dL   Magnesium    Collection Time: 10/05/21  7:26 AM   Result Value Ref Range    Magnesium 2.3 1.6 - 2.6 mg/dL   Phosphorus    Collection Time: 10/05/21  7:26 AM   Result Value Ref Range    Phosphorus 4.3 2.5 - 4.5 mg/dL   Calcium, ionized    Collection Time: 10/05/21  7:26 AM   Result Value Ref Range    Calcium, Ion 0.92 (L) 1.15 - 1.33 mmol/L   Comprehensive Metabolic Panel    Collection Time: 10/05/21  7:26 AM   Result Value Ref Range    Sodium 125 (L) 132 - 146 mmol/L    Potassium 5.7 (H) 3.5 - 5.0 mmol/L    Chloride 94 (L) 98 - 107 mmol/L    CO2 14 (L) 22 - 29 mmol/L    Anion Gap 17 (H) 7 - 16 mmol/L    Glucose 106 (H) 74 - 99 mg/dL    BUN 16 6 - 20 mg/dL CREATININE 0.7 0.5 - 1.0 mg/dL    GFR Non-African American >60 >=60 mL/min/1.73    GFR African American >60     Calcium 9.8 8.6 - 10.2 mg/dL    Total Protein 7.2 6.4 - 8.3 g/dL    Albumin 4.4 3.5 - 5.2 g/dL    Total Bilirubin 0.7 0.0 - 1.2 mg/dL    Alkaline Phosphatase 104 35 - 104 U/L    ALT 39 (H) 0 - 32 U/L    AST 44 (H) 0 - 31 U/L   CBC    Collection Time: 10/05/21  2:04 PM   Result Value Ref Range    WBC 7.0 4.5 - 11.5 E9/L    RBC 4.75 3.50 - 5.50 E12/L    Hemoglobin 12.8 11.5 - 15.5 g/dL    Hematocrit 39.2 34.0 - 48.0 %    MCV 82.5 80.0 - 99.9 fL    MCH 26.9 26.0 - 35.0 pg    MCHC 32.7 32.0 - 34.5 %    RDW 13.8 11.5 - 15.0 fL    Platelets 975 264 - 943 E9/L    MPV 10.0 7.0 - 12.0 fL   Comprehensive Metabolic Panel    Collection Time: 10/05/21  2:04 PM   Result Value Ref Range    Sodium 129 (L) 132 - 146 mmol/L    Potassium 4.0 3.5 - 5.0 mmol/L    Chloride 94 (L) 98 - 107 mmol/L    CO2 20 (L) 22 - 29 mmol/L    Anion Gap 15 7 - 16 mmol/L    Glucose 120 (H) 74 - 99 mg/dL    BUN 13 6 - 20 mg/dL    CREATININE 0.7 0.5 - 1.0 mg/dL    GFR Non-African American >60 >=60 mL/min/1.73    GFR African American >60     Calcium 9.6 8.6 - 10.2 mg/dL    Total Protein 7.0 6.4 - 8.3 g/dL    Albumin 4.6 3.5 - 5.2 g/dL    Total Bilirubin 0.6 0.0 - 1.2 mg/dL    Alkaline Phosphatase 100 35 - 104 U/L    ALT 43 (H) 0 - 32 U/L    AST 25 0 - 31 U/L   Calcium, ionized    Collection Time: 10/05/21  2:04 PM   Result Value Ref Range    Calcium, Ion 1.22 1.15 - 1.33 mmol/L       IMAGING: XR THORACIC SPINE (2 VIEWS)    Result Date: 10/2/2021  1. There is no acute compression fracture of the thoracic spine. 2. Spondylosis. XR LUMBAR SPINE (2-3 VIEWS)    Result Date: 10/2/2021  1. There is no acute compression fracture or malalignment of the lumbar spine. 2. The disc spaces are generally well maintained. CT HEAD WO CONTRAST    Result Date: 10/3/2021  No acute intracranial abnormality.      CT HEAD WO CONTRAST    Result Date: 9/30/2021  No acute intracranial abnormality. IR ANGIOGRAM EXTREMITY BILATERAL    Result Date: 10/5/2021  1. There is no evidence of any intracranial aneurysms, AVMs or dural AV fistulas. 2. There is no evidence of any cervical AVMs or dural AV fistulas. 3. Patulous basilar tip which is a normal anatomical variant. 4. No evidence of any vasospasm RECOMMENDATIONS No further neuro interventional follow-up is required at this time. Conservative medical management including management of blood pressure as most likely etiology would be paramesencephalic bleed. If symptoms persist other rarer etiologies can be pursued at the discretion of treating neurology and neurosurgical teams. Patients: If you have questions regarding some of the verbiage in your report, please visit [email or web address] com for a definition. If you have any other questions, please contact your physician. Physicians: If you have questions regarding this report, please call:St [address]     IR CATH PLACE 1ST ORD THOR BRACHIO    Result Date: 10/5/2021  1. There is no evidence of any intracranial aneurysms, AVMs or dural AV fistulas. 2. There is no evidence of any cervical AVMs or dural AV fistulas. 3. Patulous basilar tip which is a normal anatomical variant. 4. No evidence of any vasospasm RECOMMENDATIONS No further neuro interventional follow-up is required at this time. Conservative medical management including management of blood pressure as most likely etiology would be paramesencephalic bleed. If symptoms persist other rarer etiologies can be pursued at the discretion of treating neurology and neurosurgical teams. Patients: If you have questions regarding some of the verbiage in your report, please visit [email or web address] com for a definition. If you have any other questions, please contact your physician. Physicians:  If you have questions regarding this report, please call:St [address]     IR LOGAN ART CATH THOR/BRACH ADDL ORDER    Result Date: 10/5/2021  1. There is no evidence of any intracranial aneurysms, AVMs or dural AV fistulas. 2. There is no evidence of any cervical AVMs or dural AV fistulas. 3. Patulous basilar tip which is a normal anatomical variant. 4. No evidence of any vasospasm RECOMMENDATIONS No further neuro interventional follow-up is required at this time. Conservative medical management including management of blood pressure as most likely etiology would be paramesencephalic bleed. If symptoms persist other rarer etiologies can be pursued at the discretion of treating neurology and neurosurgical teams. Patients: If you have questions regarding some of the verbiage in your report, please visit [email or web address] com for a definition. If you have any other questions, please contact your physician. Physicians: If you have questions regarding this report, please call:St [address] TRANSCRANIAL DOPPLER COMPLETE    Result Date: 9/30/2021  Unremarkable study, no evidence for vasospasm     US TRANSCRANIAL DOPPLER COMPLETE    Result Date: 9/29/2021  Unremarkable study, no evidence for vasospasm     IR LOGAN CATH PLACE COM CAROTID INTRACRANIAL LEFT W ANGIO    Result Date: 10/5/2021  1. There is no evidence of any intracranial aneurysms, AVMs or dural AV fistulas. 2. There is no evidence of any cervical AVMs or dural AV fistulas. 3. Patulous basilar tip which is a normal anatomical variant. 4. No evidence of any vasospasm RECOMMENDATIONS No further neuro interventional follow-up is required at this time. Conservative medical management including management of blood pressure as most likely etiology would be paramesencephalic bleed. If symptoms persist other rarer etiologies can be pursued at the discretion of treating neurology and neurosurgical teams. Patients: If you have questions regarding some of the verbiage in your report, please visit [email or web address] com for a definition. If you have any other questions, please contact your physician. Physicians: If you have questions regarding this report, please call:[facility]     IR LOGAN CATH PLACE [facility] CAROTID INTRACRANIAL RIGHT W ANGIO    Result Date: 10/5/2021  1. There is no evidence of any intracranial aneurysms, AVMs or dural AV fistulas. 2. There is no evidence of any cervical AVMs or dural AV fistulas. 3. Patulous basilar tip which is a normal anatomical variant. 4. No evidence of any vasospasm RECOMMENDATIONS No further neuro interventional follow-up is required at this time. Conservative medical management including management of blood pressure as most likely etiology would be paramesencephalic bleed. If symptoms persist other rarer etiologies can be pursued at the discretion of treating neurology and neurosurgical teams. Patients: If you have questions regarding some of the verbiage in your report, please visit [email or web address] com for a definition. If you have any other questions, please contact your physician. Physicians: If you have questions regarding this report, please call:[facility] LOGAN CATH PLACE VERTEBRAL ART LEFT W ANGIO    Result Date: 10/5/2021  1. There is no evidence of any intracranial aneurysms, AVMs or dural AV fistulas. 2. There is no evidence of any cervical AVMs or dural AV fistulas. 3. Patulous basilar tip which is a normal anatomical variant. 4. No evidence of any vasospasm RECOMMENDATIONS No further neuro interventional follow-up is required at this time. Conservative medical management including management of blood pressure as most likely etiology would be paramesencephalic bleed. If symptoms persist other rarer etiologies can be pursued at the discretion of treating neurology and neurosurgical teams. Patients: If you have questions regarding some of the verbiage in your report, please visit [email or web address] com for a definition.   If you have any other questions, please contact your physician. Physicians: If you have questions regarding this report, please call:St [address]     IR LOGAN CATH PLACE VERTEBRAL ART RIGHT W ANGIO    Result Date: 10/5/2021  1. There is no evidence of any intracranial aneurysms, AVMs or dural AV fistulas. 2. There is no evidence of any cervical AVMs or dural AV fistulas. 3. Patulous basilar tip which is a normal anatomical variant. 4. No evidence of any vasospasm RECOMMENDATIONS No further neuro interventional follow-up is required at this time. Conservative medical management including management of blood pressure as most likely etiology would be paramesencephalic bleed. If symptoms persist other rarer etiologies can be pursued at the discretion of treating neurology and neurosurgical teams. Patients: If you have questions regarding some of the verbiage in your report, please visit [email or web address] com for a definition. If you have any other questions, please contact your physician. Physicians: If you have questions regarding this report, please call:St [address]       REVIEW OF SYSTEMS:   Gen: Negative for nausea, vomiting, diarrhea, fever, chills, night sweats Negative for wheezes, asthma or SOB    PHYSICAL EXAMINATION:   General appearance: in moderate distress with movement in bed,  alert and oriented x3 pleasant and cooperative   Build: obese   Function: unable to assess at this time. HEENT:   Head: normocephalic and atraumatic   Pupils: regular, round and equal.   Sclera: icterus present,   Lungs:   No respiratory distress. Symmetrical expansion. Abdomen:   soft nontender nondistended positive bowel sounds. Lumbar Spine:  Spine inspection: normal,   CVA tenderness: No   Palpation:.  Some tenderness with palpation   Range of motion: unable to assess  Extremities:   Tremors: yes   Intact:Yes   Varicose veins: absent   Pulses:popliteal pulses 2+ bilaterally   Cyanosis:none   Edema: none  Neurological:   Gait: unable to assess   States sensation intact proximal and distal to R short leg splint  Dermatology:   Skin:no unusual rashes, no skin lesions, no palpable subcutaneous nodules and good skin turgor      Inpatient Medication regimen:   Current Facility-Administered Medications   Medication Dose Route Frequency Provider Last Rate Last Admin    carvedilol (COREG) tablet 25 mg  25 mg Oral BID JANUARY Lin MD   25 mg at 10/06/21 0745    sodium chloride tablet 2 g  2 g Oral TID JANUARY Welsh MD   2 g at 10/06/21 0745    oxyCODONE (OXY-IR) immediate release tablet 7.5 mg  7.5 mg Oral Q4H PRN Allen Lin MD   7.5 mg at 10/06/21 0727    HYDROmorphone (DILAUDID) injection 0.5 mg  0.5 mg IntraVENous Q4H PRN Allen Lin MD   0.5 mg at 10/06/21 0516    [Held by provider] butalbital-acetaminophen-caffeine (FIORICET, ESGIC) per tablet 1 tablet  1 tablet Oral BID PRN Allen Lin MD   1 tablet at 10/05/21 0134    cloNIDine (CATAPRES) tablet 0.1 mg  0.1 mg Oral TID Allen Lin MD   0.1 mg at 10/06/21 0802    methocarbamol (ROBAXIN) tablet 1,500 mg  1,500 mg Oral 4x Daily PRN Rishiino C Gianfrate, DO   1,500 mg at 10/06/21 0726    heparin (porcine) injection 5,000 Units  5,000 Units SubCUTAneous Q8H Deborah Bella MD   5,000 Units at 10/06/21 0555    potassium chloride (KLOR-CON M) extended release tablet 20 mEq  20 mEq Oral TID HANG Davenport - CNS   20 mEq at 10/05/21 2223    gabapentin (NEURONTIN) capsule 600 mg  600 mg Oral TID HANG Daven[place] - CNS   600 mg at 10/06/21 0802    trimethobenzamide (TIGAN) injection 200 mg  200 mg IntraMUSCular Q6H PRN HANG Davenport - CNS   200 mg at 10/04/21 0511    levETIRAcetam (KEPPRA) tablet 1,000 mg  1,000 mg Oral BID Deborah Bella MD   1,000 mg at 10/06/21 0802    niMODipine (NIMOTOP) capsule 60 mg  60 mg Oral 6 times per day HANG [place]en[place] - CNS   60 mg at 10/06/21 0540    labetalol (NORMODYNE;TRANDATE) injection 10 mg  10 mg IntraVENous Q10 Min PRN Bernie Moellers, APRN - CNS   10 mg at 10/06/21 0556    hydrALAZINE (APRESOLINE) injection 10 mg  10 mg IntraVENous Q10 Min PRN Bernie Moellers, APRN - CNS   10 mg at 10/04/21 1109    polyethylene glycol (GLYCOLAX) packet 17 g  17 g Oral Daily Miah Bobo MD   17 g at 10/05/21 1418    sennosides-docusate sodium (SENOKOT-S) 8.6-50 MG tablet 2 tablet  2 tablet Oral Daily Miah Bobo MD   2 tablet at 10/06/21 0801    LORazepam (ATIVAN) tablet 0.5 mg  0.5 mg Oral Q4H PRN Juventino Dover MD   0.5 mg at 10/05/21 2153    sodium chloride flush 0.9 % injection 5-40 mL  5-40 mL IntraVENous 2 times per day Jacque Yu MD   10 mL at 10/05/21 2031    sodium chloride flush 0.9 % injection 5-40 mL  5-40 mL IntraVENous PRN Jacque Yu MD        LORazepam (ATIVAN) injection 2 mg  2 mg IntraVENous Q4H PRN Juventino Dover MD   2 mg at 09/30/21 2114       Inpatient Regimen:  Gabapentin 600mg po tid  Dilaudid 0.5mg IV q 4 hour prn pain  Robaxin 1500mg po qid prn   Oxycodone IR 7.5mg q 4 hours prn pain     Impression:  Headaches r/t SAH (subarachnoid hemorrhage)  Nonspecific generalized body aches  Acute low back pain  Bipolar  Hx of active alcohol abuse  Seizures  Myofascial pain    Recommendation for plan of care:   1. Continue with Gabapentin 600mg po tid  2. Continue with Oxycodone 7.5mg q 4 hours prn pain  3. Continue with Dilaudid 0.5mg every 4 hours when not controlled with po medication  4. Add Lidoderm patch q 12 hours on and off prn acute myofascial low back pain  5. Consider increasing Cymbalta from 60mg daily to 120mg po daily   6. Pain management signing off as her current pain medication regimen appears to be controlling her pain at this time. 7. Pt is not a candidate for interventional procedures at this time.         Thank you for the referral.   Darvin Nichols [facility]

## 2021-10-06 NOTE — PROGRESS NOTES
Patient voicing concerns of bariatric bed leaking air. Bed is found to have multiple small holes and leaking air. Patient's bed is sunken in on the sides. This nurse attempted to order a new bariatric bed but there are none left in the hospital currently. Offered patient to move into a regular bed but the patient is refusing this option. Propped patient up on pillows for comfort and support until a new bariatric bed can be ordered in the morning.

## 2021-10-06 NOTE — DISCHARGE SUMMARY
pontine hemorrhage seen in the subarachnoid space LP   confirmed by neurosurgery. Diagnostic angiogram was requested a week   ago and did not show any aneurysm however an adequate vertebral artery   spin could not be performed. Diagnostic angiogram is repeated today to   reassess for the presence of any aneurysms. Appearance of the bleed on CT appears to be perimesencephalic.       POST-PROCEDURE DIAGNOSIS: No evidence of any aneurysms AVMs or dural   AV fistulas.       COMPARISON: Previous diagnostic cerebral angiogram, CT angiographic   documentation both from 9 2021       ANESTHESIA: Conscious sedation and local anesthesia, physician   directed conscious sedation       VESSELS CATHETERIZED:       1. Right vertebral artery. 2. Right thyrocervical trunk. 3. Right common carotid artery. 4. Left common carotid artery. 5. Left subclavian artery/vertebral artery. 6. Left thyrocervical trunk.       RADIATION EXPOSURE DATA:  6357 MG Y       TOTAL FLUOROSCOPY TIME: 8 minutes and 58 seconds       CONTRAST USED: 130 mL of Isovue-300, 10 mL of Isovue-370       TECHNIQUE:           After informed consent was obtained, the patient was brought into the   angiography suite. Both the right wrist and the groin sites were   prepped and draped in the usual sterile fashion. A 5 Dominican   micropuncture kit needle was then used to access the right radial   artery. A 5F sheath was them introduced and secured in place, and   vasospastic cocktail was    administered. A 5 Dominican SIM 2 catheter was then inserted, double   flushed and connected to continuous heparinized saline. The diagnostic   catheter was then used to selectively catheterize the above-mentioned   vessels.  Digital subtraction angiography was then performed after   selective catheterization of each vessel.  After satisfactory images   were obtained, the diagnostic catheter was removed from the patient,   and a 24 cm right radial compression device was applied and was   inflated with 16 mL of air. The sheath was removed and hemostasis was   achieved and checked. Patient was transferred in a stable condition.               INTERPRETATION OF IMAGES:           1. Right vertebral artery injections : The origin of the right vertebral artery appear to be free from any   atherosclerotic disease. Right vertebral artery appear to be normal in   size, caliber and course. Intracranially, there was normal   opacification of the right posterior inferior cerebellar artery,   bilateral anterior-inferior cerebellar arteries, bilateral superior   cerebellar arteries, bilateral posterior cerebral artery, basilar   artery lumen and apex.  Normal capillary phase and venous drainage was   noted. No evidence of aneurysms, AVMs or dural AV fistulas. There is a   patulous basilar tip which is well examined in the 3-D rotational   angiographic documentation and can be better individually analyzed in   the axial coronal and sagittal reconstructions from the 3-D spin   imaging. This is considered a normal anatomical variant. There is normal visualization of the formation of the anterior spinal   artery from the intrathecal right vertebral artery in the cervical   projections.           2. Right thyrocervical trunk injections: No evidence of any cervical   spinal AVMs or dural AV fistula.       3. Right common carotid artery injections:Intracranially, there was   normal opacification of the right ophthalmic artery, right posterior   communicating artery, right anterior choroidal artery, right middle   cerebral artery and its branches and right anterior cerebral artery   and its branches. Normal capillary phase and venous drainage was noted.    No evidence of AVMs or dural AV fistulas.       4. Left common carotid artery injections: Intracranially, there was   normal opacification of the left ophthalmic artery, left posterior   communicating artery, left anterior choroidal artery, left middle cerebral artery and its branches, and left anterior cerebral artery   and its branches. Normal capillary phase and venous drainage was   noted. No evidence of any aneurysms ,AVMs or dural AV fistulas.       5. Left subclavian artery/vertebral artery injections: Rudimentary   left vertebral artery as patient has a right vertebral artery dominant   system.       6. Left thyrocervical trunk injections: Normal opacification of the   thyrocervical trunk. No evidence of any cervical spinal AVM or dural   AV fistula is noticed.       7. 3D rotational angiographic imaging: With the catheter placed in the   right vertebral artery 3-D rotational angiographic duct mentation was   obtained. The raw images were transferred to an independent   workstation, and volume rendered 3D images were generated and   reviewed.        movements during the study were secondary to patient not able to hold   her breath due to heavy snoring . Patient could not tolerate the   procedure completely awake however had severe snoring with sedation         1. There is no evidence of any intracranial aneurysms, AVMs or dural   AV fistulas. 2. There is no evidence of any cervical AVMs or dural AV fistulas. 3. Patulous basilar tip which is a normal anatomical variant. 4. No evidence of any vasospasm           RECOMMENDATIONS   No further neuro interventional follow-up is required at this time. Conservative medical management including management of blood pressure   as most likely etiology would be paramesencephalic bleed.    If symptoms persist other rarer etiologies can be pursued at the   discretion of treating neurology and neurosurgical teams. CT head 9/27  Findings are concerning for a small amount of extra-axial hemorrhage,   subdural versus subarachnoid, anterior to the brainstem and the inferior   aspect of the kayla.  Artifact thought less likely but not excluded. Short-term follow-up or correlation with MRI would be useful. Hospital Course:   Patient presented to the hospital with photophobia, phonophobia, headache, and neck stiffness. She was found to have a nontraumatic subarachnoid hemorrhage. She had some seizure-like activity in the ED and was medicated with benzodiazepines. I initiated transfer request at all of our regional tertiary centers. [facility] and [facility] were not taking transfers.  agreed to take her. Due to immense overcrowding of our hospital she boarded in the ED for several days. Ultimately she did get a bed in the ICU. She underwent cerebral angiogram, the report is quite brief but verbally I am told there was no aneurysm seen but that she was very uncooperative and agitated during the procedure so reportedly the images were suboptimal.  She experienced hypertension which was managed with various medications including nicardipine drip. The blood pressure has gotten mostly under control but she does have spikes at times still. I have been actively uptitrating the blood pressure medications. She has had a lot of headache which is severe most of the time but also we have seen some lethargy with usage of higher doses of opioids such as 1 mg IV Dilaudid. For this reason I have not really been able to increase the dose too much, and certainly I do worry about her respiratory status due to her BMI of 60. We continued to explore transfer to other hospitals. [facility] and [facility] were contacted again and they still could not take her. At patient's family request I also contacted [facility] which also could not take her. On 10/5, a different neuro interventionalist performed repeat angiogram.  This time he felt he got good images and did not find any type of aneurysm, AVM, etc.  See report above. He actually felt that there would be reasonable just to treat the hypertension and to treat supportively. On 10/5 the patient started to complain of mid to low back pain.   She actually states that has been going on for MD, 2 g at 10/06/21 0745    oxyCODONE (OXY-IR) immediate release tablet 7.5 mg, 7.5 mg, Oral, Q4H PRN, Davi Barroso MD, 7.5 mg at 10/06/21 0727    HYDROmorphone (DILAUDID) injection 0.5 mg, 0.5 mg, IntraVENous, Q4H PRN, Davi Barroso MD, 0.5 mg at 10/06/21 0516    [Held by provider] butalbital-acetaminophen-caffeine (FIORICET, ESGIC) per tablet 1 tablet, 1 tablet, Oral, BID PRN, Davi Barroso MD, 1 tablet at 10/05/21 0134    cloNIDine (CATAPRES) tablet 0.1 mg, 0.1 mg, Oral, TID, Davi Barroso MD, 0.1 mg at 10/06/21 0802    methocarbamol (ROBAXIN) tablet 1,500 mg, 1,500 mg, Oral, 4x Daily PRN, Trini HANSON Gianfrate, DO, 1,500 mg at 10/06/21 0726    heparin (porcine) injection 5,000 Units, 5,000 Units, SubCUTAneous, Q8H, Damion Fernandez MD, 5,000 Units at 10/06/21 0555    potassium chloride (KLOR-CON M) extended release tablet 20 mEq, 20 mEq, Oral, TID, HANG Neely CNS, 20 mEq at 10/05/21 2223    gabapentin (NEURONTIN) capsule 600 mg, 600 mg, Oral, TID, HANG Neely CNS, 600 mg at 10/06/21 0802    trimethobenzamide (TIGAN) injection 200 mg, 200 mg, IntraMUSCular, Q6H PRN, HANG Neely CNS, 200 mg at 10/04/21 0511    levETIRAcetam (KEPPRA) tablet 1,000 mg, 1,000 mg, Oral, BID, Damion Fernandez MD, 1,000 mg at 10/06/21 0802    niMODipine (NIMOTOP) capsule 60 mg, 60 mg, Oral, 6 times per day, HANG Neely, 60 mg at 10/06/21 0540    labetalol (NORMODYNE;TRANDATE) injection 10 mg, 10 mg, IntraVENous, Q10 Min PRN, HANG Neely - CNS, 10 mg at 10/06/21 0556    hydrALAZINE (APRESOLINE) injection 10 mg, 10 mg, IntraVENous, Q10 Min PRN, HANG Neely - CNS, 10 mg at 10/04/21 1109    polyethylene glycol (GLYCOLAX) packet 17 g, 17 g, Oral, Daily, Rogelio Wren MD, 17 g at 10/05/21 1418    sennosides-docusate sodium (SENOKOT-S) 8.6-50 MG tablet 2 tablet, 2 tablet, Oral, Daily, Kalpana Pittman MD, 2 tablet at 10/06/21 0801    LORazepam (ATIVAN) tablet 0.5 mg, 0.5 mg, Oral, Q4H PRN, Jose Wilson MD, 0.5 mg at 10/05/21 2153    sodium chloride flush 0.9 % injection 5-40 mL, 5-40 mL, IntraVENous, 2 times per day, Neha Castro MD, 10 mL at 10/06/21 0802    sodium chloride flush 0.9 % injection 5-40 mL, 5-40 mL, IntraVENous, PRN, Neha Castro MD    LORazepam (ATIVAN) injection 2 mg, 2 mg, IntraVENous, Q4H PRN, Jose Wilson MD, 2 mg at 09/30/21 2114       Activity: TBD  Diet: TBD    Follow-up with PCP in 1 week.     Note that over 30 minutes was spent in preparing discharge papers, discussing discharge with patient, medication review, etc.    Signed:  Jose Wilson MD    10/6/2021  7:55 AM

## 2021-10-11 ENCOUNTER — HOSPITAL ENCOUNTER (INPATIENT)
Age: 46
LOS: 4 days | Discharge: HOME HEALTH | DRG: 065 | End: 2021-10-15
Attending: PHYSICAL MEDICINE & REHABILITATION | Admitting: PHYSICAL MEDICINE & REHABILITATION
Payer: COMMERCIAL

## 2021-10-11 PROCEDURE — 1280000000 HC REHAB R&B

## 2021-10-11 RX ORDER — SULFAMETHOXAZOLE AND TRIMETHOPRIM 800; 160 MG/1; MG/1
1 TABLET ORAL EVERY 12 HOURS SCHEDULED
Status: COMPLETED | OUTPATIENT
Start: 2021-10-11 | End: 2021-10-14

## 2021-10-11 RX ORDER — GABAPENTIN 400 MG/1
400 CAPSULE ORAL 3 TIMES DAILY
Status: DISCONTINUED | OUTPATIENT
Start: 2021-10-11 | End: 2021-10-15 | Stop reason: HOSPADM

## 2021-10-11 RX ORDER — TRAZODONE HYDROCHLORIDE 50 MG/1
50 TABLET ORAL NIGHTLY PRN
Status: DISCONTINUED | OUTPATIENT
Start: 2021-10-12 | End: 2021-10-15 | Stop reason: HOSPADM

## 2021-10-11 RX ORDER — ACETAMINOPHEN 325 MG/1
650 TABLET ORAL EVERY 6 HOURS PRN
Status: DISCONTINUED | OUTPATIENT
Start: 2021-10-11 | End: 2021-10-12

## 2021-10-11 RX ORDER — OXYCODONE HYDROCHLORIDE 5 MG/1
5 TABLET ORAL EVERY 6 HOURS PRN
Status: DISCONTINUED | OUTPATIENT
Start: 2021-10-11 | End: 2021-10-15 | Stop reason: HOSPADM

## 2021-10-11 RX ORDER — LIDOCAINE 4 G/G
1 PATCH TOPICAL DAILY
Status: DISCONTINUED | OUTPATIENT
Start: 2021-10-12 | End: 2021-10-15 | Stop reason: HOSPADM

## 2021-10-11 RX ORDER — CLONIDINE HYDROCHLORIDE 0.1 MG/1
0.1 TABLET ORAL 3 TIMES DAILY
Status: DISCONTINUED | OUTPATIENT
Start: 2021-10-11 | End: 2021-10-15 | Stop reason: HOSPADM

## 2021-10-11 RX ORDER — BUTALBITAL, ACETAMINOPHEN AND CAFFEINE 50; 325; 40 MG/1; MG/1; MG/1
1 TABLET ORAL 2 TIMES DAILY PRN
Status: DISCONTINUED | OUTPATIENT
Start: 2021-10-11 | End: 2021-10-15 | Stop reason: HOSPADM

## 2021-10-11 RX ORDER — SENNA AND DOCUSATE SODIUM 50; 8.6 MG/1; MG/1
2 TABLET, FILM COATED ORAL DAILY
Status: DISCONTINUED | OUTPATIENT
Start: 2021-10-12 | End: 2021-10-15 | Stop reason: HOSPADM

## 2021-10-11 RX ORDER — HEPARIN SODIUM 10000 [USP'U]/ML
7500 INJECTION, SOLUTION INTRAVENOUS; SUBCUTANEOUS EVERY 8 HOURS
Status: DISCONTINUED | OUTPATIENT
Start: 2021-10-11 | End: 2021-10-15

## 2021-10-11 RX ORDER — CYCLOBENZAPRINE HCL 10 MG
10 TABLET ORAL 3 TIMES DAILY PRN
Status: DISCONTINUED | OUTPATIENT
Start: 2021-10-11 | End: 2021-10-15 | Stop reason: HOSPADM

## 2021-10-11 RX ORDER — PANTOPRAZOLE SODIUM 20 MG/1
20 TABLET, DELAYED RELEASE ORAL
Status: DISCONTINUED | OUTPATIENT
Start: 2021-10-12 | End: 2021-10-15 | Stop reason: HOSPADM

## 2021-10-11 RX ORDER — CARVEDILOL 25 MG/1
25 TABLET ORAL 2 TIMES DAILY
Status: DISCONTINUED | OUTPATIENT
Start: 2021-10-11 | End: 2021-10-15 | Stop reason: HOSPADM

## 2021-10-11 RX ORDER — POLYETHYLENE GLYCOL 3350 17 G/17G
17 POWDER, FOR SOLUTION ORAL DAILY PRN
Status: DISCONTINUED | OUTPATIENT
Start: 2021-10-11 | End: 2021-10-15 | Stop reason: HOSPADM

## 2021-10-11 RX ORDER — OXYCODONE HYDROCHLORIDE 10 MG/1
10 TABLET ORAL EVERY 6 HOURS PRN
Status: DISCONTINUED | OUTPATIENT
Start: 2021-10-11 | End: 2021-10-15 | Stop reason: HOSPADM

## 2021-10-11 ASSESSMENT — PAIN DESCRIPTION - LOCATION: LOCATION: GENERALIZED

## 2021-10-11 ASSESSMENT — PAIN SCALES - GENERAL: PAINLEVEL_OUTOF10: 6

## 2021-10-11 NOTE — LETTER
PORTABLE PATIENT PROFILE  Negra Allen  [id number]    MEDICAL DIAGNOSIS/CONDITION:   Patient Active Problem List   Diagnosis    Morbid obesity with BMI of 50.0-59.9, adult ([facility]    Bipolar disorder ([facility]    HTN (hypertension), benign    Mixed hyperlipidemia    Acute diverticulitis    Joint pain    Knee pain    Morbid obesity with BMI of 60.0-69.9, adult ([facility]    Diverticulosis    SAH (subarachnoid hemorrhage) (HCC)    Lactic acid acidosis    Hypokalemia    Seizure ([facility]    Back pain       INSURANCE INFORMATION:  Payor: MARIELLE /  /  /     ADVANCED DIRECTIVES:      [unfilled]     EMERGENCY CONTACT:       RISK FACTORS:   Social History     Tobacco Use    Smoking status: Never Smoker    Smokeless tobacco: Never Used   Substance Use Topics    Alcohol use: Yes     Alcohol/week: 1.0 standard drinks     Types: 1 Glasses of wine per week     Comment: socially       ALLERGIES:  Allergies   Allergen Reactions    No Known Allergies        IMMUNIZATIONS:  Immunization History   Administered Date(s) Administered    Influenza, Quadv, 6 mo and older, IM, PF (Flulaval, Fluarix) 10/02/2018    Tdap (Boostrix, Adacel) 01/23/2015       SWALLOWING:   Difficulty Chewing or Swallowing Food: No    VISION AND HEARING:   Sensory Problems  Visual impairment: Glasses    PHYSICIANS INVOLVED WITH CARE:    Miguel A Teague MD  No ref.  provider found  Jeremiah Shukla MD

## 2021-10-12 LAB
ANION GAP SERPL CALCULATED.3IONS-SCNC: 13 MMOL/L (ref 7–16)
BASOPHILS ABSOLUTE: 0.07 E9/L (ref 0–0.2)
BASOPHILS RELATIVE PERCENT: 1.2 % (ref 0–2)
BUN BLDV-MCNC: 11 MG/DL (ref 6–20)
CALCIUM SERPL-MCNC: 9 MG/DL (ref 8.6–10.2)
CHLORIDE BLD-SCNC: 107 MMOL/L (ref 98–107)
CO2: 20 MMOL/L (ref 22–29)
CREAT SERPL-MCNC: 0.8 MG/DL (ref 0.5–1)
EOSINOPHILS ABSOLUTE: 0.42 E9/L (ref 0.05–0.5)
EOSINOPHILS RELATIVE PERCENT: 7.5 % (ref 0–6)
GFR AFRICAN AMERICAN: >60
GFR NON-AFRICAN AMERICAN: >60 ML/MIN/1.73
GLUCOSE BLD-MCNC: 99 MG/DL (ref 74–99)
HCT VFR BLD CALC: 33.8 % (ref 34–48)
HEMOGLOBIN: 10.7 G/DL (ref 11.5–15.5)
IMMATURE GRANULOCYTES #: 0.01 E9/L
IMMATURE GRANULOCYTES %: 0.2 % (ref 0–5)
LYMPHOCYTES ABSOLUTE: 1.86 E9/L (ref 1.5–4)
LYMPHOCYTES RELATIVE PERCENT: 33.2 % (ref 20–42)
MCH RBC QN AUTO: 27.2 PG (ref 26–35)
MCHC RBC AUTO-ENTMCNC: 31.7 % (ref 32–34.5)
MCV RBC AUTO: 86 FL (ref 80–99.9)
MONOCYTES ABSOLUTE: 0.55 E9/L (ref 0.1–0.95)
MONOCYTES RELATIVE PERCENT: 9.8 % (ref 2–12)
NEUTROPHILS ABSOLUTE: 2.7 E9/L (ref 1.8–7.3)
NEUTROPHILS RELATIVE PERCENT: 48.1 % (ref 43–80)
PDW BLD-RTO: 14.3 FL (ref 11.5–15)
PLATELET # BLD: 269 E9/L (ref 130–450)
PMV BLD AUTO: 10.3 FL (ref 7–12)
POTASSIUM REFLEX MAGNESIUM: 4.1 MMOL/L (ref 3.5–5)
RBC # BLD: 3.93 E12/L (ref 3.5–5.5)
SODIUM BLD-SCNC: 140 MMOL/L (ref 132–146)
WBC # BLD: 5.6 E9/L (ref 4.5–11.5)

## 2021-10-12 PROCEDURE — 92523 SPEECH SOUND LANG COMPREHEN: CPT

## 2021-10-12 PROCEDURE — 6370000000 HC RX 637 (ALT 250 FOR IP): Performed by: PHYSICAL MEDICINE & REHABILITATION

## 2021-10-12 PROCEDURE — 97535 SELF CARE MNGMENT TRAINING: CPT

## 2021-10-12 PROCEDURE — 80048 BASIC METABOLIC PNL TOTAL CA: CPT

## 2021-10-12 PROCEDURE — 97129 THER IVNTJ 1ST 15 MIN: CPT

## 2021-10-12 PROCEDURE — 97166 OT EVAL MOD COMPLEX 45 MIN: CPT

## 2021-10-12 PROCEDURE — 85025 COMPLETE CBC W/AUTO DIFF WBC: CPT

## 2021-10-12 PROCEDURE — 97162 PT EVAL MOD COMPLEX 30 MIN: CPT

## 2021-10-12 PROCEDURE — 97110 THERAPEUTIC EXERCISES: CPT

## 2021-10-12 PROCEDURE — 92610 EVALUATE SWALLOWING FUNCTION: CPT

## 2021-10-12 PROCEDURE — 36415 COLL VENOUS BLD VENIPUNCTURE: CPT

## 2021-10-12 PROCEDURE — 97112 NEUROMUSCULAR REEDUCATION: CPT

## 2021-10-12 PROCEDURE — 6360000002 HC RX W HCPCS: Performed by: PHYSICAL MEDICINE & REHABILITATION

## 2021-10-12 PROCEDURE — 99223 1ST HOSP IP/OBS HIGH 75: CPT | Performed by: PHYSICAL MEDICINE & REHABILITATION

## 2021-10-12 PROCEDURE — 97530 THERAPEUTIC ACTIVITIES: CPT

## 2021-10-12 PROCEDURE — 1280000000 HC REHAB R&B

## 2021-10-12 RX ORDER — ACETAMINOPHEN 500 MG
1000 TABLET ORAL EVERY 8 HOURS
Status: DISCONTINUED | OUTPATIENT
Start: 2021-10-12 | End: 2021-10-15 | Stop reason: HOSPADM

## 2021-10-12 RX ADMIN — HYDROCORTISONE: 25 CREAM TOPICAL at 20:48

## 2021-10-12 RX ADMIN — GABAPENTIN 400 MG: 400 CAPSULE ORAL at 09:12

## 2021-10-12 RX ADMIN — OXYCODONE HYDROCHLORIDE 10 MG: 10 TABLET ORAL at 06:22

## 2021-10-12 RX ADMIN — CLONIDINE HYDROCHLORIDE 0.1 MG: 0.1 TABLET ORAL at 20:47

## 2021-10-12 RX ADMIN — CLONIDINE HYDROCHLORIDE 0.1 MG: 0.1 TABLET ORAL at 00:03

## 2021-10-12 RX ADMIN — BUTALBITAL, ACETAMINOPHEN, AND CAFFEINE 1 TABLET: 50; 325; 40 TABLET ORAL at 13:31

## 2021-10-12 RX ADMIN — SULFAMETHOXAZOLE AND TRIMETHOPRIM 1 TABLET: 800; 160 TABLET ORAL at 20:48

## 2021-10-12 RX ADMIN — HYDROCORTISONE: 25 CREAM TOPICAL at 00:54

## 2021-10-12 RX ADMIN — GABAPENTIN 400 MG: 400 CAPSULE ORAL at 13:31

## 2021-10-12 RX ADMIN — TRAZODONE HYDROCHLORIDE 50 MG: 50 TABLET ORAL at 20:47

## 2021-10-12 RX ADMIN — TRAZODONE HYDROCHLORIDE 50 MG: 50 TABLET ORAL at 00:34

## 2021-10-12 RX ADMIN — GABAPENTIN 400 MG: 400 CAPSULE ORAL at 20:47

## 2021-10-12 RX ADMIN — SULFAMETHOXAZOLE AND TRIMETHOPRIM 1 TABLET: 800; 160 TABLET ORAL at 09:12

## 2021-10-12 RX ADMIN — HEPARIN SODIUM 7500 UNITS: 10000 INJECTION INTRAVENOUS; SUBCUTANEOUS at 23:57

## 2021-10-12 RX ADMIN — CARVEDILOL 25 MG: 25 TABLET, FILM COATED ORAL at 00:34

## 2021-10-12 RX ADMIN — CLONIDINE HYDROCHLORIDE 0.1 MG: 0.1 TABLET ORAL at 09:12

## 2021-10-12 RX ADMIN — BUTALBITAL, ACETAMINOPHEN, AND CAFFEINE 1 TABLET: 50; 325; 40 TABLET ORAL at 02:12

## 2021-10-12 RX ADMIN — GABAPENTIN 400 MG: 400 CAPSULE ORAL at 00:04

## 2021-10-12 RX ADMIN — HEPARIN SODIUM 7500 UNITS: 10000 INJECTION INTRAVENOUS; SUBCUTANEOUS at 15:55

## 2021-10-12 RX ADMIN — SULFAMETHOXAZOLE AND TRIMETHOPRIM 1 TABLET: 800; 160 TABLET ORAL at 00:34

## 2021-10-12 RX ADMIN — HEPARIN SODIUM 7500 UNITS: 10000 INJECTION INTRAVENOUS; SUBCUTANEOUS at 06:22

## 2021-10-12 RX ADMIN — ACETAMINOPHEN 650 MG: 325 TABLET ORAL at 10:05

## 2021-10-12 RX ADMIN — PANTOPRAZOLE SODIUM 20 MG: 20 TABLET, DELAYED RELEASE ORAL at 06:22

## 2021-10-12 RX ADMIN — CLONIDINE HYDROCHLORIDE 0.1 MG: 0.1 TABLET ORAL at 13:31

## 2021-10-12 RX ADMIN — OXYCODONE 5 MG: 5 TABLET ORAL at 00:04

## 2021-10-12 RX ADMIN — CARVEDILOL 25 MG: 25 TABLET, FILM COATED ORAL at 09:12

## 2021-10-12 RX ADMIN — CARVEDILOL 25 MG: 25 TABLET, FILM COATED ORAL at 20:47

## 2021-10-12 RX ADMIN — ACETAMINOPHEN 1000 MG: 500 TABLET ORAL at 17:30

## 2021-10-12 RX ADMIN — HYDROCORTISONE: 25 CREAM TOPICAL at 09:12

## 2021-10-12 RX ADMIN — HEPARIN SODIUM 7500 UNITS: 10000 INJECTION INTRAVENOUS; SUBCUTANEOUS at 00:04

## 2021-10-12 ASSESSMENT — PAIN SCALES - GENERAL
PAINLEVEL_OUTOF10: 3
PAINLEVEL_OUTOF10: 0
PAINLEVEL_OUTOF10: 9
PAINLEVEL_OUTOF10: 7
PAINLEVEL_OUTOF10: 0
PAINLEVEL_OUTOF10: 6
PAINLEVEL_OUTOF10: 6
PAINLEVEL_OUTOF10: 9
PAINLEVEL_OUTOF10: 4

## 2021-10-12 ASSESSMENT — PAIN DESCRIPTION - PROGRESSION
CLINICAL_PROGRESSION: NOT CHANGED
CLINICAL_PROGRESSION: GRADUALLY IMPROVING
CLINICAL_PROGRESSION: GRADUALLY IMPROVING

## 2021-10-12 ASSESSMENT — PAIN DESCRIPTION - FREQUENCY
FREQUENCY: INTERMITTENT
FREQUENCY: INTERMITTENT

## 2021-10-12 ASSESSMENT — PAIN DESCRIPTION - LOCATION
LOCATION: BACK
LOCATION: GENERALIZED
LOCATION: HEAD
LOCATION: GENERALIZED

## 2021-10-12 ASSESSMENT — PAIN DESCRIPTION - ONSET
ONSET: GRADUAL
ONSET: GRADUAL

## 2021-10-12 ASSESSMENT — PAIN DESCRIPTION - ORIENTATION
ORIENTATION: ANTERIOR
ORIENTATION: LOWER

## 2021-10-12 ASSESSMENT — PAIN DESCRIPTION - DESCRIPTORS: DESCRIPTORS: ACHING;BURNING;DISCOMFORT

## 2021-10-12 ASSESSMENT — PAIN - FUNCTIONAL ASSESSMENT
PAIN_FUNCTIONAL_ASSESSMENT: ACTIVITIES ARE NOT PREVENTED
PAIN_FUNCTIONAL_ASSESSMENT: PREVENTS OR INTERFERES SOME ACTIVE ACTIVITIES AND ADLS
PAIN_FUNCTIONAL_ASSESSMENT: PREVENTS OR INTERFERES SOME ACTIVE ACTIVITIES AND ADLS

## 2021-10-12 ASSESSMENT — PAIN DESCRIPTION - PAIN TYPE
TYPE: ACUTE PAIN
TYPE: CHRONIC PAIN

## 2021-10-12 NOTE — PROGRESS NOTES
Occupational Therapy  OCCUPATIONAL THERAPY DAILY NOTE    Date:10/12/2021  Patient Name: Joseph Barber  MRN: [id number]  : 1975  Room: [address] [place] [address]     Referring Practitioner: Lacey Farfan MD  Diagnosis: SAH- CVA- R occipital  Pertinent Past Medical History: HTN, HLD, DM, GERD, hx R TKR 2021 & ESEQUIEL  Restrictions: falls    Functional Assessment:   Date Status AE  Comments   Feeding 10/12/21 Supervision      Grooming 10/12/21 Stand by Assist   seated         Oral Care 10/12/21 Supervision   seated   Bathing 10/12/21 Moderate Assist   Shower level seated & standing   UB Dressing 10/12/21 Minimal Assist   Min vc's   LB Dressing 10/12/21 Moderate Assist  reacher vc's for technique using reacher to don pants   Footwear 10/12/21 Maximal Assist  Shoe horn vc's to use AE properly to facilitate donning shoes   Toileting 10/12/21 Maximal Assist  Toilet tongs vc's for use   Homemaking 10/12/21  TBA       Functional Transfers / Balance:   Date Status DME  Comments   Sit Balance 10/12/21 Supervision   Seated in shower on bench   Stand Balance 10/12/21 Minimal Assist  Bariatric rw    [] Tub  [] Shower   Transfer 10/12/21 Minimal Assist  Bariatric rw & bench vc's for safety   Commode   Transfer 10/12/21 Minimal Assist  Bariatric rw \"   Functional   Mobility 10/12/21 Contact Guard Assist  \" \"   Other: supine>sit 10/12/21/ SBA       Functional Exercises / Activity:   Pt tolerated FMC, dexterity & strengthening exercises using B hands using red theraputty with F+ tolerance. Sensory / Neuromuscular Re-Education:      Cognitive Skills:   Status Comments   Problem   Solving fair  During ADL   Memory fair  \"   Sequencing good  \"   Safety fair  \"     Visual Perception:    Education:  Pt educated with regards to how to use AE to facilitate LB dressing    [] Family teach completed on:    Pain Level: Pt c/o 10/10 neck pain & 8/10 back pain.  Pt had been medicated prior to session    Additional Notes:       Patient has made good Concurrent Tx - 15  [] Co-Tx -   [] Group Tx -   [] Time Missed -     Third Session    [] Individual Tx-   [] Concurrent Tx -  [] Co-Tx -   [] Group Tx -   [] Time Missed -         Total Tx Time: Jose Bran 1, [place] R/L [id number]

## 2021-10-12 NOTE — PROGRESS NOTES
SPEECH/LANGUAGE PATHOLOGY  SPEECH/LANGUAGE/COGNITIVE EVALUATION      PATIENT NAME:  Penelope Arevalo  (female)     MRN:  [id number]    :  1975  (48 y.o.)  STATUS:  Inpatient: Room 5514/5514-B    TODAY'S DATE:  10/12/2021  REFERRING PROVIDER:    Segundo Reese Md   SPECIFIC PROVIDER ORDER: SLP eval and treat  Date of order:  10/11/21  REASON FOR REFERRAL: Evaluation s/p SAH  EVALUATING THERAPIST: BOBBI Carrasco    ADMITTING DIAGNOSIS: SAH (subarachnoid hemorrhage) ([facility] [I60.9]    VISIT DIAGNOSIS:      SPEECH THERAPY  PLAN OF CARE   The speech therapy  POC is established based on physician order, speech pathology diagnosis and results of clinical assessment     SPEECH PATHOLOGY DIAGNOSIS:    Mild Cognitive Impairment    Speech Pathology intervention is recommended 3-6 times per week for LOS or when goals are met with emphasis on the following:      Conditions Requiring Skilled Therapeutic Intervention for speech, language and/or cognition    Cognitive linguistic impairment    Specific Speech Therapy Interventions to Include: Therapeutic tasks for Cognition    Specific instructions for next treatment:      To initiate POC  To initiate memory tasks  To initiate thought organization tasks    SHORT/LONG TERM GOALS  Pt will improve attention, immediate, short term, recent memory during structured and unstructured tasks with 95% accuracy   Pt will improve problem solving/thought organization during structured and unstructured tasks with 95% accuracy     Patient stated goals: Agreed with above,     Rehabilitation Potential/Prognosis: excellent     _______________________________________________________________________  ASSESSMENT:  MOTOR SPEECH       Oral Peripheral Examination   Adequate lingual/labial strength     Parameters of Speech Production  Respiration:  Adequate for speech production  Articulation:  Within functional limits  Resonance:  Within functional limits  Quality:   Within functional limits  Pitch: Within functional limits  Intensity: Within functional limits  Fluency:  Intact  Prosody Intact    RECEPTIVE LANGUAGE    Comprehension of Yes/No Questions: Within functional limits    Process  Simple Verbal Commands:   Within functional limits  Process Intermediate Verbal Commands:   Within functional limits  Process Complex Verbal Commands:     Latent    Comprehension of Conversation:      Latent      EXPRESSIVE LANGUAGE     Serials: Functional    Imitation:  Words   Functional   Sentences Functional    Naming:  (Modality used:  Verbal)  Confrontation Naming  Functional  Functional Description  Functional  Response Naming: Functional    Conversation:      Conversation was within functional limits    COGNITION       Attention/Orientation  Attention: Easily Distracted    Orientation:   Person:  oriented    Place:  oriented    Year:  correct    Month:  accurate within 5 days    Day of Week:  correct    Situation:  accurately described    Memory   Long Term Recall: Address:  recalled without cuing  Birthdate:  recalled without cuing  Age: recalled without cuing  Family: recalled without cuing    Immediate Recall: Sock:  recalled accurately     Blue:  recalled accurately     Bed:  recalled accurately    Delayed Recall:   Sock: recalled without cuing     Blue:  recalled without cuing     Bed:  recalled without cuing    Organization/Problem Solving/Reasoning   Sequencing:    Verbal: Functional      Motor: To be assessed    Problem solving: Verbal: Functional      Motor: To be assessed    Mathematics:  Simple: To be assessed     Complex:   To be assessed      CLINICAL OBSERVATIONS NOTED DURING THE EVALUATION  Latent responses    FIMS SCORES      Swallowing    Current Status  7--Modified Independent    Short Term Goal 6--Modified Independent    Long Term Goal 7--Independent     Receptive    Current Status  5--Supervision /Modified Independent   Short Term Goal 6--Modified Independent    Long Term Goal 7--Independent     Expressive    Current Status  5--Supervision / Modified Independent   Short Term Goal 6--Modified Independent    Long Term Goal 7--Independent     Social Interaction    Current Status  6--Modified Independent    Short Term Goal 6--Modified Independent    Long Term Goal 7--Independent         Problem Solving    Current Status  4--Minimal Assistance    Short Term Goal 5--Supervision    Long Term Goal 6--Modified Independent      Memory    Current Status  4--Minimal Assistance    Short Term Goal 5--Supervision    Long Term Goal 6--Modified Independent       EDUCATION    Speech Pathologist (SLP) completed education with the patient and/or family regarding identified cognitive linguistic deficits and subsequent need for speech pathology intervention. Discussed deficit areas to be targeted by formal intervention and established short/long term goals. Reviewed compensatory strategies to improve functional outcome (as appropriate). Encouraged patient and/or family to engage SLP in structured Q&A session relative to identified deficit areas. Patient and/or family indicated understanding of all information provided via satisfactory verbal response. ? Prognosis for improvements is good  This plan will be re-evaluated and revised in 1 week  if warranted. Patient stated goals: Agreed with above  Treatment goals discussed with Patient   The Patient understand(s) the diagnosis, prognosis and plan of care     DAWIT Santizo, CCC-SLP/L   Speech Language Pathologist  [phone]      The admitting diagnosis and active problem list, as listed below have been reviewed prior to initiation of this evaluation.         ACTIVE PROBLEM LIST:   Patient Active Problem List   Diagnosis    Morbid obesity with BMI of 50.0-59.9, adult ([facility]    Bipolar disorder ([facility]    HTN (hypertension), benign    Mixed hyperlipidemia    Acute diverticulitis    Joint pain    Knee pain    Morbid obesity with BMI of 60.0-69.9, adult ([facility]    Diverticulosis    SAH (subarachnoid hemorrhage) (HCC)    Lactic acid acidosis    Hypokalemia    Seizure (HCC)    Back pain

## 2021-10-12 NOTE — CARE COORDINATION
Social Work Assessment Summary    PCP: Dr. Elizabeth Light    Patient Resides: Alone with 1 dog (poodle) and 2 cats. Home Architecture : Pt lives in a 2 story condo with 2 steps to enter with 0 rails through the garage door there are 5 steps and 1 rail. There are 6 steps and 2 rails to the second floor where the tub/shower combo is along with the bedroom. Mothers condo: 3 story condo where her bed/bath would be on the main floor. Will you return to your own home? Undetermined either her home or Mother's. Primary Caregiver: Mart Pozo (mother) age 79 healthy and drives, not working. Mother lives 0.7miles away from pt. 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 Community Agency Involvement PTA : OP after knee surgery - patient cannot recall company. History of Alcohol Rehab - 6/202 @ [facility]. Do they have a medical profile: No    Family Teaching: TBS    Strength: \"I'm humbly awesome\" per pt    Preference: \"Strengthen my back\" per pt.       NAME RELATION HOME # WORK # CELL #   Mart Pozo  Mother [phone]     [facility] [phone]              Height: 5'8  Weight: [address] [facility] 10/12/2021

## 2021-10-12 NOTE — PROGRESS NOTES
Physical Therapy  Weekly Team Note    Evaluating TherapistSalome Ry GRANER, [id number]    ROOM: [address] [place] [address]  DIAGNOSIS: SAH  PRECAUTIONS: Falls, SBP < 140  HPI:  49 yo female with history of HTN, HLD, DM, GERD, hx R TKR 7/19/2021 & ESEQUIEL, Bipolar presented to the hospital with photophobia, phonophobia, headache, and neck stiffness. She was found to have a nontraumatic subarachnoid hemorrhage. She had some seizure-like activity in the ED and was medicated with benzodiazepines. Social:  Pt lives alone in a 2 floor plan 2 steps and no rail. Bed and bath on 1st or  2nd floor. 4 stairs to 2nd floor with bilateral rails. States she has \"Plenty\" of friends and family available to assist PRN. Prior to admission: Pt used Rollator on first floor, no device on 2nd floor and SPC in community. Independent PTA     Initial Evaluation  Date: 10/12/21 10/12/21 Comments:  Short Term Goals Long Term Goals    Does pt have pain? 10/10 low back and headache Back/ neck : 6/10  R thigh 6/10 - attributes back pain from when EMS \"dropped her\"    --attributes R thigh pain to angio     Bed Mobility  Rolling: SBA  Supine to sit: SBA  Sit to supine: MaxA  Scooting: SBA Rolling: SBA  Supine to sit: SBA  Sit to supine: MaxA  Scooting: SBA -Body habitus (adjustable bed at baseline)    -Required assist with BLEs due to body habitus and back pain SBA Independent     Transfers Sit to stand: Ezra  Stand to sit: Ezra  Stand pivot: Ezra [employer]   Sit to stand: Ezra  Stand to sit: Ezra  Stand pivot: [employer] -Back pain and generalized weakness     SBA   Modified Independent    AAD   Ambulation    50 feet with Ezra with [employer]       50 feet x 2 with Ezra with cheyanne [employer]  10mWT: .73 m/s  6mWT: 48 m -Steadying assist as pt fatigues.       - endurance deficits demonstrated by 6mWT   300 feet with SBA with AAD     300 feet with Modified Independent   with AAD    10mWT: 1.0 m/s  6mWT: 200m   Wheel Chair Mobility 150 Independent   NT      Car Transfers NT NT Size of patient vs vehicle SBA Modified Independent     Stair negotiation: ascended and descended  8 steps with B rail with Ezra NT Pain and generalized weakness 12 steps with single rail with SBA 12 steps with single rail with Modified Independent     Curb Step:   ascended and descended 4 inch step with Ezra and [employer] NT  4 inch step with SBA and [employer] 4 inch step with Modified Independent   and [employer]   Picking up object off the floor NT Picked up shoe SBA  Will  shoe with SBA assist Will  shoe with  Independent   assist   BLE ROM WFL       BLE Strength 2/5 B hips (pain in back)  4/5 hip extension, knees, ankle       Balance  Ezra [employer] standing        BBS: 43/56  Ezra jamar [employer] Standing Fall risk demonstrated by BBS      BBS: >45/56     Date Family Teach Completed        Is additional Family Teaching Needed? Y or N Y Y      Hindering Progress Obesity, cognition, Weakness, Pain Obesity, cognition, Weakness, Pain      PT recommended ELOS 10 days 10 days      Team's Discharge Plan  10-14 days      Therapist at [address], PT, DPT [id number]             Date:  10/12/21  Supporting factors:  States she has multiple friends/ family members to assist as needed, age  Barriers to discharge: Body habitus, PLOF (deconditioned)  Additional comments:  Pt has functional mobility deficits exacerbated by back pain, R lateral thigh pain and hospital acquired deconditioning. Pt would benefit from continued gait training, therapeutic activity/exercise to improve strength, balance, endurance, and control/manage pain.    DME:  Owns Rollator,  \"multiple walkers\", [facility]  After Care:  Jacqueline Aviles PT, DPT  [id number]

## 2021-10-12 NOTE — H&P
PM&R Admission History & Physical Examination    Patient: Celso Kanner  Age/sex: 48 y.o. female  Medical Record #: [id number]    Admission to Acute Rehab Unit: Date: 10/11/2021   Diagnosis: SAH (subarachnoid hemorrhage) ([facility] [address] [I60.9]  Admitting Physician: Abeba Ray MD  Primary care provider: Harika Khan DO        Chief complaint:   Impairments and acitivities limitations in ADLs and mobility secondary to [facility]    HPI:   Celso Kanner is a 48 y.o. female who initially presented to [facility] ED on 9/27/2021 due to severe headache, nausea, and neck stiffness. She was found to have a non-traumatic SAH. Reportedly she had a seizure in the ED. She was evaluated by NSGY and no acute surgical intervention was recommended. NSGY started keppra x 7 days for seizure prophylaxis. Due to overcrowding she boarded in the ER for several days and was then transferred to [facility] because they had a Neuro ICU bed available. Her course was complicated by elevated blood pressure and ongoing headaches. Course also noted for UTI, for which she is on Bactrim. Her workup was completed.  Once medically stabilized she was transferred back to [facility] and will begin the Acute Rehab program.          Past Medical History:   Diagnosis Date    Diverticulosis     GERD without esophagitis     Hyperlipidemia     Hypertension     Knee pain     Morbid obesity due to excess calories ([facility] [address]     Obesity     Sleep apnea     CPAP setting 11       Past Surgical History:   Procedure Laterality Date    COLONOSCOPY  2013    COLONOSCOPY N/A 1/25/2019    COLONOSCOPY DIAGNOSTIC performed by Brenna Farley MD at [address] [facility] [place] COLONOSCOPY  11/04/2019    HYSTERECTOMY         Family history: No pertinent history reported     Allergies   Allergen Reactions    No Known Allergies        Scheduled Meds:  carvedilol, 25 mg, BID  cloNIDine, 0.1 mg, TID  gabapentin, 400 mg, TID  heparin (porcine), 7,500 Units, Q8H  hydrocortisone, , BID  pantoprazole, 20 mg, QAM AC  sennosides-docusate sodium, 2 tablet, Daily  sulfamethoxazole-trimethoprim, 1 tablet, 2 times per day  lidocaine, 1 patch, Daily        PRN Meds  polyethylene glycol, 17 g, Daily PRN  cyclobenzaprine, 10 mg, TID PRN  acetaminophen, 650 mg, Q6H PRN  butalbital-acetaminophen-caffeine, 1 tablet, BID PRN  traZODone, 50 mg, Nightly PRN  oxyCODONE, 5 mg, Q6H PRN   Or  oxyCODONE, 10 mg, Q6H PRN        Social History:  Social History     Socioeconomic History    Marital status: Single     Spouse name: Not on file    Number of children: Not on file    Years of education: Not on file    Highest education level: Not on file   Occupational History    Not on file   Tobacco Use    Smoking status: Never Smoker    Smokeless tobacco: Never Used   Vaping Use    Vaping Use: Never used   Substance and Sexual Activity    Alcohol use: Yes     Alcohol/week: 1.0 standard drinks     Types: 1 Glasses of wine per week     Comment: socially    Drug use: No    Sexual activity: Not on file   Other Topics Concern    Not on file   Social History Narrative    Not on file     Social Determinants of Health     Financial Resource Strain:     Difficulty of Paying Living Expenses:    Food Insecurity:     Worried About Running Out of Food in the Last Year:     [address] in the Last Year:    Transportation Needs:     Lack of Transportation (Medical):      Lack of Transportation (Non-Medical):    Physical Activity:     Days of Exercise per Week:     Minutes of Exercise per Session:    Stress:     Feeling of Stress :    Social Connections:     Frequency of Communication with Friends and Family:     Frequency of Social Gatherings with Friends and Family:     Attends [employer] Services:     Active Member of Clubs or Organizations:     Attends Club or Organization Meetings:     Marital Status:    Intimate Partner Violence:     Fear of Current or Ex-Partner:     Emotionally Abused:     Physically Abused:     Sexually Abused:        Home situation: Lives alone in a 1 level condo with 2 steps to enter, no HR. Has a service dog       Functional History:  Premorbid ADL's: Independent   Premorbid Mobility: ambulated without a device in the home, used a cane or rolling walker in the community      Current level of function:     Physical Therapy:  Bed mobility: SBA  Transfers: Min A  Ambulation: 50 ft bariatric [employer] Min A    Occupational Therapy:  Feeding: Supervision  Grooming: SBA  UB dressing: Min A  LB dressing: Max A    Speech Therapy  Mild cognitive impairment     Review Of Systems:   Constitutional: No Fever, chills  Skin: No rash, ulcers, or other lesions  HEENT: +mild HA, No blurry vision  Respiratory: No Cough, SOB  Cardiovascular: No CP  Gastrointestinal: No nausea, vomiting, diarrhea, abdominal discomfort  Genitourinary: No Dysuria  Musculoskeletal: per HPI  Neurologic: per HPI  Psychiatric: No depression, No anxiety       Physical Examination:  Vitals:   Vitals:    10/11/21 2223 10/12/21 0044 10/12/21 0045 10/12/21 0945   BP: (!) 148/76 (!) 148/76  111/89   Pulse: 100 100  93   Resp: 19   18   Temp: 97.8 °F (36.6 °C)   97.1 °F (36.2 °C)   TempSrc: Temporal   Tympanic   SpO2: 98%      Weight:   (!) 394 lb (178.7 kg)    Height:   5' 8\" (1.727 m)        GEN: NAD, conversational, resting in bed  HEENT: NC/AT, PERRLA, EOMI   RESP: CTAB, no R/R/W  CV: +S1 S2, RRR  ABD: soft, NT, ND, normal BS   EXT: Normal ROM, No clubbing/cyanosis, 2+ pulses   SKIN: No erythema, no rash  PSYCH: appropriate mood and affect     Neuro Exam:  AAOx4  Speech clear, content appropriate  Follows multi step commands      Cranial Nerves:  I: olfactory not tested  II: Full to confrontation  III, IV, VI: extra occular muscles intact  Pupils (II, III): ERRL  V: Facial Sensation/Jaw clench:  intact  VII: Facial Motor:  intact  VIII.  Hearing:  intact  XI/X: Palate:  intact  XI: Shoulder shrug/SCM:  intact  XII: Tongue: intact      Neglect testing: No evidence of tactile or visual neglect     Coordination:  F - N:  intact    Sensory:    LT:  Intact throughout            Motor: Tone was  normal    Strength 3/5 bilateral HF; 4/5 bilateral KE, DF, PF  5/5 throughout bilateral upper extremities       DTRs:  Reflex Right Left   Biceps 2+ 2+   Triceps 2+ 2+   Brachioradialis 2+ 2+   Patella 2+ 2+   Achilles  2+ 2+     No Babinski       Laboratory:  Lab Results   Component Value Date    WBC 5.6 10/12/2021    HGB 10.7 (L) 10/12/2021    HCT 33.8 (L) 10/12/2021    MCV 86.0 10/12/2021     10/12/2021     Lab Results   Component Value Date     10/12/2021    K 4.1 10/12/2021     10/12/2021    CO2 20 10/12/2021    BUN 11 10/12/2021    CREATININE 0.8 10/12/2021    GLUCOSE 99 10/12/2021    CALCIUM 9.0 10/12/2021      Lab Results   Component Value Date    ALT 36 (H) 10/06/2021    AST 17 10/06/2021    ALKPHOS 94 10/06/2021    BILITOT 0.5 10/06/2021     Lab Results   Component Value Date    COLORU Yellow 09/27/2021    NITRU Negative 09/27/2021    GLUCOSEU Negative 09/27/2021    KETUA 15 09/27/2021    UROBILINOGEN 0.2 09/27/2021    BILIRUBINUR Negative 09/27/2021       DIAGNOSIS: Subarachnoid hemorrhage     IMPRESSION/INDIVIDUAL PLAN OF CARE:  Joe Call is a 48 y.o. female with impairments and acitivities limitations in ADLs and mobility secondary to [facility]     Patient has impairments in:  Demonstrating impaired cognition,  impaired strength, impaired balance, decreased endurance. Patient has activities limitations in self care, mobility,  and cognition, and is admitted to [facility] for acute comprehensive rehabilitation. I. Rehabilitation Necessity/Diagnosis:   Medical impact on function as a result of impaired functional mobility, ambulation, bed mobility, transfers, self-care/ADL's, strength, endurance, range of motion/flexibility, coordination and impaired gait/balance.   Treatment plan will include a comprehensive rehabilitation program with intense therapies for 3 hours/day, 5 days/week. 1. Physical therapy to address bed mobidility, car and mat transfer, progressive ambulation with use of least restrictive assistive device, optimization of gait biomechanics, truncal strengthening, lower extremity strengthening, coordination, range of motion/flexibility, wheelchair and cushion evaluation, durable medical equipment evaluation, patient and family instruction and endurance training. 2. Occupational therapy to address feeding, grooming, upper and lower body dressing and bathing, toileting, tub/toilet/bed transfers, durable medical equipment evaluation, upper extremity strengthening, range of motion/flexibility, dexterity, coordination and patient and family instruction. 3. Rehabilitation nursing 24 hours per day to monitor bowel and bladder function, work on bowel routine, voiding trials/pitts catheter management as per bladder management protocol, assess falls risk upon admission and periodically thereafter, implement and revise falls prevention strategies, maintain skin integrity through initial and daily pressure sore risk assessment (Donn scale), implement and revise pressure sore prevention strategies, educate patient and family members regarding medication administration, ADL's, transfers, and mobility and continue therapy carryover with ADL's, transfers, and mobility  4. Social work and case management consults for discharge planning/disposition issues, as well as coordination and communication of patient progress between family and providers. 5. Rehab psychology - as needed for adjustment, coping  6. Recreational therapy for community reintegration activities.   7. Speech therapy for cognition     This patient is sufficiently stable at this time of admission to [facility] to be able to participate in an intensive rehabilitation program described above and requires physician supervision by a

## 2021-10-12 NOTE — PROGRESS NOTES
Physical Therapy  Treatment Note    Evaluating Therapist: Alex Shore PT, [id number]    ROOM: [address] [place], [place] [address]  DIAGNOSIS: SAH  PRECAUTIONS: Falls, SBP < 140  HPI:  47 yo female with history of HTN, HLD, DM, GERD, hx R TKR 7/19/2021 & ESEQUIEL, Bipolar presented to the hospital with photophobia, phonophobia, headache, and neck stiffness. She was found to have a nontraumatic subarachnoid hemorrhage. She had some seizure-like activity in the ED and was medicated with benzodiazepines. Social:  Pt lives alone in a 2 floor plan 2 steps and no rail. Bed and bath on 1st or  2nd floor. 4 stairs to 2nd floor with bilateral rails. States she has \"Plenty\" of friends and family available to assist PRN. Prior to admission: Pt used Rollator on first floor, no device on 2nd floor and SPC in community. Independent PTA     Initial Evaluation  Date: 10/12/21 AM     PM    Short Term Goals Long Term Goals    Was pt agreeable to Eval/treatment? Yes IE yes     Does pt have pain?  10/10 low back and headache  Back/ neck : 6/10  R lateral thigh 6/10     Bed Mobility  Rolling: SBA  Supine to sit: SBA  Sit to supine: MaxA  Scooting: SBA  Rolling: SBA  Supine to sit: SBA  Sit to supine: NT  Scooting: SBA SBA Independent     Transfers Sit to stand: Ezra  Stand to sit: Ezra  Stand pivot: [facility]    Sit to stand: Ezra  Stand to sit: Ezra  Stand pivot: [facility] SBA   Modified Independent    AAD   Ambulation    50 feet with Ezra with [employer]        50 feet x 2 with Ezra with cheyanne [facility]  10mWT: .73 m/s  6mWT: 50 m 300 feet with SBA with AAD     300 feet with Modified Independent   with AAD    10mWT: 1.0 m/s  6mWT: 200m   Walking 10 feet on uneven surface 10 feet with Ezra with [employer]ren[employer]  NT 10 feet with SBA with AAD 10 feet with Modified Independent   with AAD   Wheel Chair Mobility 150 Independent    NT     Car Transfers NT  NT SBA Modified Independent     Stair negotiation: ascended and descended  8 steps with B rail with Ezra  NT 12 steps with single rail with SBA 12 steps with single rail with Modified Independent     Curb Step:   ascended and descended 4 inch step with Ezra and [employer]  NT 4 inch step with SBA and [employer] 4 inch step with Modified Independent   and [employer]   Picking up object off the floor NT  Picked up shoe SBA Will  shoe with SBA assist Will  shoe with  Independent   assist   BLE ROM WFL       BLE Strength 2/5 B hips (pain in back)  4/5 hip extension, knees, ankle       Balance  Ezra [employer] standing         BBS: 43/56  Ezra [employer]ar [employer] Standing      BBS: >45/56     Date Family Teach Completed        Is additional Family Teaching Needed? Y or N Y  Y     Hindering Progress Obesity, cognition, Weakness, Pain  Obesity, cognition, Weakness, Pain     PT recommended ELOS 10 days  10 days     Team's Discharge Plan        Therapist at Team Meeting            Therapeutic Exercise:    BBS  10mWT  6mWT        ASSESSMENT  Pt displays functional ability as noted in the objective portion of this evaluation. Comments:  Pts back pain and fatigue limiting activity. Pt ambulating with narrow base of support. Pt mildly unsteady as she fatigues. Pt performed outcome measures above demonstrating patient has balance and endurance deficits. Patient would benefit from continued skilled PT to maximize functional mobility independence. Patient education  Pt educated on role of PT. Patient response to education:   Pt verbalized understanding Pt demonstrated skill Pt requires further education in this area   x x x     Rehab potential is Good for reaching above PT goals. Pts/ family goals   1. Return to PLOF     Patient and or family understand(s) diagnosis, prognosis, and plan of care. yes    PLAN  PT care will be provided in accordance with the objectives noted above. Whenever appropriate, clear delegation orders will be provided for nursing staff.   Exercises and functional mobility practice will be used as well as appropriate assistive devices or modalities to obtain goals. Patient and family education will also be administered as needed. Frequency of treatments will be 2x/day M-F and 1x/day Sat or Sun x 10 days.     Time in: 1515  Time out: Serge Paris PT, DPT  [id number]

## 2021-10-12 NOTE — PROGRESS NOTES
Physical Therapy  Initial Evaluation  Evaluating Therapist: Yoanna Babb PT, DPT  [id number]    ROOM: [address] [place] [address]  DIAGNOSIS: SAH  PRECAUTIONS: Falls, SBP < 140  HPI:  49 yo female with history of HTN, HLD, DM, GERD, hx R TKR 7/19/2021 & ESEQUIEL, Bipolar presented to the hospital with photophobia, phonophobia, headache, and neck stiffness. She was found to have a nontraumatic subarachnoid hemorrhage. She had some seizure-like activity in the ED and was medicated with benzodiazepines. Social:  Pt lives alone in a 2 floor plan 2 steps and no rail. Bed and bath on 1st or  2nd floor. 4 stairs to 2nd floor with bilateral rails. States she has \"Plenty\" of friends and family available to assist PRN. Prior to admission: Pt used Rollator on first floor, no device on 2nd floor and SPC in community. Independent PTA     Initial Evaluation  Date: 10/12/21 AM     PM    Short Term Goals Long Term Goals    Was pt agreeable to Eval/treatment? Yes       Does pt have pain?  10/10 low back and headache       Bed Mobility  Rolling: SBA  Supine to sit: SBA  Sit to supine: MaxA  Scooting: SBA   SBA Independent     Transfers Sit to stand: Ezra  Stand to sit: Ezra  Stand pivot: [employer]ra [employer]     SBA   Modified Independent    AAD   Ambulation    50 feet with Ezra with che[employer]ne [employer]      10mWT: TBA  6mWT:  feet with SBA with AAD     300 feet with Modified Independent   with AAD    10mWT: 1.0 m/s  6mWT: 200m   Walking 10 feet on uneven surface 10 feet with Ezra with [employer]   10 feet with SBA with AAD 10 feet with Modified Independent   with AAD   Wheel Chair Mobility 150 Independent         Car Transfers NT   SBA Modified Independent     Stair negotiation: ascended and descended  8 steps with B rail with Ezra   12 steps with single rail with SBA 12 steps with single rail with Modified Independent     Curb Step:   ascended and descended 4 inch step with Ezra and [employer]   4 inch step with SBA and Jonas [employer] 4 inch step with Modified Independent and [employer]   Picking up object off the floor NT   Will  shoe with SBA assist Will  shoe with  Independent   assist   BLE ROM WFL       BLE Strength 2/5 B hips (pain in back)  4/5 hip extension, knees, ankle       Balance  Ezra [employer] standing         BBS: TBA      BBS: >45/56     Date Family Teach Completed        Is additional Family Teaching Needed? Y or N Y       Hindering Progress Obesity, cognition, Weakness, Pain       PT recommended ELOS 10 days       Team's Discharge Plan        Therapist at Team Meeting          Pt is alert and Oriented x 4. 3/3 STM recall test  Sensation: WNL  Edema: WNL  Endurance: WNL     ASSESSMENT  Pt displays functional ability as noted in the objective portion of this evaluation. Comments:  Pt presents with chief complaint of headache and low back pain which she states has hurt since \"EMS dropped her\". Pts pain limiting independence. Pt using WW to improve ambulation distance/activity tolerance. Pt would benefit from comprehensive rehabilitation program to improve strength, balance, endurance, and manage pain. Patient education  Pt educated on role of PT. Patient response to education:   Pt verbalized understanding Pt demonstrated skill Pt requires further education in this area   x x x     Rehab potential is Good for reaching above PT goals. Pts/ family goals   1. Return to PLOF     Patient and or family understand(s) diagnosis, prognosis, and plan of care. yes    PLAN  PT care will be provided in accordance with the objectives noted above. Whenever appropriate, clear delegation orders will be provided for nursing staff. Exercises and functional mobility practice will be used as well as appropriate assistive devices or modalities to obtain goals. Patient and family education will also be administered as needed. Frequency of treatments will be 2x/day M-F and 1x/day Sat or Sun x 10 days.     Time in: 0830   Time out: [address] PT, BRENDENT  [id number]

## 2021-10-12 NOTE — PROGRESS NOTES
Speech Language Pathology  ACUTE REHABILITATION--DAILY PROGRESS NOTE          PATIENT NAME:  Sonja Raymundo  (female)                MRN:  [id number]                       :  1975  (48 y.o.)  STATUS:  Inpatient: Room 5514/5514-B                     TODAY'S DATE:  10/12/2021  REFERRING PROVIDER:    Jesi Atkinson Md      SPECIFIC PROVIDER ORDER: SLP eval and treat  Date of order:  10/11/21  REASON FOR REFERRAL: Evaluation s/p SAH  EVALUATING THERAPIST: BOBBI Frank     ADMITTING DIAGNOSIS: SA[facility]ach[facility] ([facility] [I60.9]     VISIT DIAGNOSIS:          SPEECH THERAPY  PLAN OF CARE   The speech therapy  POC is established based on physician order, speech pathology diagnosis and results of clinical assessment      SPEECH PATHOLOGY DIAGNOSIS:    Mild Cognitive Impairment     Speech Pathology intervention is recommended 3-6 times per week for LOS or when goals are met with emphasis on the following:       Conditions Requiring Skilled Therapeutic Intervention for speech, language and/or cognition     Cognitive linguistic impairment     Specific Speech Therapy Interventions to Include: Therapeutic tasks for Cognition     Specific instructions for next treatment:                 To initiate POC  To initiate memory tasks  To initiate thought organization tasks     SHORT/LONG TERM GOALS  Pt will improve attention, immediate, short term, recent memory during structured and unstructured tasks with 95% accuracy   Pt will improve problem solving/thought organization during structured and unstructured tasks with 95% accuracy      Patient stated goals: Agreed with above,      Rehabilitation Potential/Prognosis: excellent       FIMS    Swallowing                          Current Status             7--Modified Independent                       Short Term Goal         6--Modified Independent                       Long Term Goal          7--Independent                Receptive Current Status             5--Supervision /Modified Independent              Short Term Goal         6--Modified Independent                       Long Term Goal          7--Independent                Expressive                          Current Status             5--Supervision / Modified Independent             Short Term Goal         6--Modified Independent                       Long Term Goal          7--Independent                Social Interaction                          Current Status             6--Modified Independent                       Short Term Goal         6--Modified Independent                       Long Term Goal          7--Independent                                                               Problem Solving                          Current Status             4--Minimal Assistance               Short Term Goal         5--Supervision               Long Term Goal          6--Modified Independent                          Memory                          Current Status             4--Minimal Assistance               Short Term Goal         5--Supervision               Long Term Goal          6- Modified Independent                     SWALLOWING:      Diet:  Regular consistency solids with thin liquids (IDDSI level 0)     Requires Supervision during meals d/t distractability     LANGUAGE:    Good expression and understanding of wants/needs/health care circumstances. Decreased topic maintenance requiring redirection back to topic         COGNITION:       Decreased attention and mild deficits with recent memory, distracted easily. Decreased thought organization related to attending to task completion. Safety:  fair    SPEECH:     WFL, No Dysarthria, No Dysfluencies    SP recommended after discharge:  NA  Supervision recommended at discharge: None    Will continue SP intervention as per previously established POC.     EDUCATION: Speech Pathologist (SLP) completed education with the patient and/or family regarding identified cognitive linguistic deficits and subsequent need for speech pathology intervention. Discussed deficit areas to be targeted by formal intervention and established short/long term goals. Reviewed compensatory strategies to improve functional outcome (as appropriate). Encouraged patient and/or family to engage SLP in structured Q&A session relative to identified deficit areas. Patient and/or family indicated understanding of all information provided via satisfactory verbal response.     Minute Tracking:    Individual therapy:   15 minutes  Concurrent therapy:    0 minutes  Group therapy:     0 minutes  Co-treatment therapy:    0 minutes    Total minutes for 10/12/2021: 15 minutes

## 2021-10-12 NOTE — PROGRESS NOTES
Occupational Therapy   Occupational Therapy Initial Assessment  Date: 10/12/2021   Patient Name: Luz Blas  MRN: [id number]     : 1975  Room: 5514B    Referring Practitioner: Sofia Ayers MD  Diagnosis: SAH- CVA- R occipital  Pertinent Past Medical History: HTN, HLD, DM, GERD, hx R TKR 2021 & ESEQUIEL  Restrictions: falls    Date of Service: 10/12/2021    Discharge Recommendations:  Home with assist PRN, Outpatient OT       Subjective   Chart Reviewed: Yes  Family / Caregiver Present: No  Subjective: Pt presents supine in bed & was agreeable to OT intervention. Pain Comments: Pt did c/o 10/10 neck pain & 8/10 back pain during OT session.  Pt had been medicated prior to OT session    Social/Functional History  Lives With: Alone  Type of Home: [address] [place] [address]: One level- 6 steps from garage to living level or 2 MAHESH no HR front door  Bathroom Shower/Tub: Tub/Shower unit  Bathroom Toilet: Standard  Home Equipment: Rolling walker, Cane  ADL Assistance: Independent  Homemaking Assistance: Independent  Ambulation Assistance: Independent  Transfer Assistance: Independent  Occupation: Full time employment  IADL Comments: PLOF pt independent in all areas- ADLs, transfers, mobiltiy, homemaking & working  Additional Comments: 1 small dog- service dof & 2 cats  PLOF pt reported she used a rw in the community for outing, sc for short outing & tended to furniture walk using no device around the house     Objective   Vision: Impaired  Vision Exceptions: Wears glasses at all times  Hearing: Within functional limits      Orientation  Overall Orientation Status: Within Functional Limits     Observation/Palpation  Posture: Fair     Balance  Sitting Balance: Supervision  Standing Balance: Minimal assistance  Functional Mobility  Functional - Mobility Device: Rolling Walker  Activity: To/from bathroom  Assist Level: Contact guard assistance  Functional Mobility Comments: min vc's for safety & hand placement  Toilet Transfers  Toilet - Technique: Ambulating  Equipment Used: Standard toilet  Toilet Transfer: Minimal assistance  Toilet Transfers Comments: bariatric rw  Shower Transfers  Shower - Transfer Type: To and From  Shower - Transfer To: Shower seat with back  Shower - Technique: Ambulating  Shower Transfers: Minimal assistance  Shower Transfers Comments: min vc's steppignn in & out of walk in shower     ADL  Feeding: Supervision;Setup  Grooming: Stand by assistance;Setup; Increased time to complete- seated  UE Bathing: Minimal assistance; Increased time to complete;Verbal cueing;Setup- Assist to wash her hair due to matted hair  LE Bathing: Setup;Verbal cueing; Increased time to complete;Minimal assistance- Pt stood to wash her bridget area  UE Dressing: Minimal assistance; Increased time to complete;Verbal cueing  LE Dressing: Maximum assistance;Setup;Verbal cueing; Increased time to complete- Pt instructed on how to use AE to facilitate LB dressing- pants, & shoes  Toileting: Maximum assistance     Coordination  Movements Are Fluid And Coordinated: No  Coordination and Movement description: Fine motor impairments; Left UE;Right UE  Quality of Movement Other  Comment: Decreased FMC LUE>RUE     Bed mobility  Supine to Sit: Stand by assistance  Scooting: Stand by assistance  Comment: vc's for hand placement  Transfers  Stand Pivot Transfers: Contact guard assistance  Sit to stand: Contact guard assistance  Stand to sit: Contact guard assistance  Transfer Comments: vc's for hand placement  Vision - Basic Assessment  Prior Vision: Wears glasses all the time  Visual History: No significant visual history  Patient Visual Report: No visual complaint reported.   Cognition  Overall Cognitive Status: Exceptions  Problem Solving: Assistance required to generate solutions;Assistance required to correct errors made     Sensation  Overall Sensation Status: grossly intact to touch      BUE AROM: BUE AROM in all planes <3/4  Right & Left Hand AROM: B hand grasp & rellease WFL. pt able to oppose her thumb to each digit  LUE Strength  L Hand General: 4-/5  LUE Strength Comment: 3+/5 in all planes  RUE Strength  R Hand General: 4/5  RUE Strength Comment: 4-/5 in all planes     Hand Dominance: Right    Left Hand Strength -    Handle Setting 2: 50# & norm for pt age & gender is 56#  Right Hand Strength -    Handle Setting 2: 54# & norm for pt age & gender is 62#    Fine Motor Skills  Left 9-Hole Peg Test:  29.5 seconds 50# & norm for pt age & gender is 18.0 seconds  Right 9-Hole Peg Test:  24.0 seconds & norm for pt age & gender is 17.4 seconds       Plan   Times per week: OT twice a day for 10 days to address above problema reas  Times per day: Twice a day  Current Treatment Recommendations: Strengthening, Gait Training, Patient/Caregiver Education & Training, Home Management Training, Equipment Evaluation, Education, & procurement, Balance Training, Neuromuscular Re-education, Functional Mobility Training, Positioning, Endurance Training, Safety Education & Training, Self-Care / ADL    Assessment   Performance deficits / Impairments: Decreased functional mobility ; Decreased endurance;Decreased coordination;Decreased ADL status; Decreased posture;Decreased balance;Decreased strength;Decreased high-level IADLs;Decreased fine motor control  Prognosis: Good  Decision Making: Medium Complexity  OT Education: OT Role;Plan of Care;Equipment;Precautions; ADL Adaptive Strategies;Transfer Training;Energy Conservation  Patient Education: Pt educated with regards to OT process. Pt participated in OT goal planning.  Pt pleasant & cooperative thorughout the OT session  REQUIRES OT FOLLOW UP: Yes  Activity Tolerance: Patient Tolerated treatment well  Safety Devices in place: Yes  Type of devices: Call light within reach         Goals  Long term goals  Time Frame for Long term goals : 10 days to address above problem areas  Long term goal 1: Pt demo independent to eat

## 2021-10-12 NOTE — PROGRESS NOTES
SPEECH/LANGUAGE PATHOLOGY  CLINICAL ASSESSMENT OF SWALLOWING FUNCTION   and PLAN OF CARE    PATIENT NAME:  Viri Almodovar  (female)     MRN:  [id number]    :  1975  (48 y.o.)  STATUS:  Inpatient: Room 5514/5514-B    TODAY'S DATE:  10/12/2021  REFERRING PROVIDER:   Brendon Rajput MD  SPECIFIC PROVIDER ORDER: SLP eval and treat Date of order:  10/11/21  REASON FOR REFERRAL: Dysphagia  EVALUATING THERAPIST: BOBBI Aguirre                 RESULTS:    DYSPHAGIA DIAGNOSIS:   Clinical indicators of normal swallow function      DIET RECOMMENDATIONS:  Regular consistency solids (IDDSI level 7) with  thin liquids (IDDSI level 0)     FEEDING RECOMMENDATIONS:     Assistance level:  Supervision is needed during all oral intake d/t distractability      Compensatory strategies recommended: Not applicable      Discussed recommendations with nursing and/or faxed report to referring provider: Yes    SPEECH THERAPY  PLAN OF CARE   The dysphagia POC is established based on physician order, dysphagia diagnosis and results of clinical assessment     Dysphagia therapy is not recommended     Conditions Requiring Skilled Therapeutic Intervention for dysphagia:    Not applicable    Specific dysphagia interventions to include:     Not applicable    Specific instructions for next treatment:  not applicable   Patient Treatment Goals:    Short Term Goals:  Not applicable no therapy warranted     Long Term Goals:   Pt will maintain adequate nutrition/hydration via PO intake of the least restrictive oral diet with implementation of safe swallow/ compensatory strategies and decrease signs/symptoms of aspiration to less than 1 x/day.       Patient/family Goal:    not applicable    Plan of care discussed with Patient   The Patient understand(s) the diagnosis, prognosis and plan of care     Rehabilitation Potential/Prognosis: NA                    ADMITTING DIAGNOSIS: [facility] [I60.9]    VISIT goals.    Divina Sands, DAWIT, CCC-SLP/L   Speech Language Pathologist  [id number]    [x]The admitting diagnosis and active problem list, have been reviewed prior to initiation of this evaluation.         ACTIVE PROBLEM LIST:   Patient Active Problem List   Diagnosis    Morbid obesity with BMI of 50.0-59.9, adult ([facility]    [facility]    HTN (hypertension), benign    Mixed hyperlipidemia    Acute diverticulitis    Joint pain    Knee pain    Morbid obesity with BMI of 60.0-69.9, adult ([facility]    Diverticulosis    SAH (subarachnoid hemorrhage) (HCC)    Lactic acid acidosis    Hypokalemia    Seizure ([facility]    Back pain         CPT code:  42015  bedside swallow eval    **

## 2021-10-13 PROCEDURE — 97129 THER IVNTJ 1ST 15 MIN: CPT

## 2021-10-13 PROCEDURE — 6370000000 HC RX 637 (ALT 250 FOR IP): Performed by: PHYSICAL MEDICINE & REHABILITATION

## 2021-10-13 PROCEDURE — 97535 SELF CARE MNGMENT TRAINING: CPT

## 2021-10-13 PROCEDURE — 97530 THERAPEUTIC ACTIVITIES: CPT

## 2021-10-13 PROCEDURE — 1280000000 HC REHAB R&B

## 2021-10-13 PROCEDURE — 97110 THERAPEUTIC EXERCISES: CPT

## 2021-10-13 PROCEDURE — 6360000002 HC RX W HCPCS: Performed by: PHYSICAL MEDICINE & REHABILITATION

## 2021-10-13 PROCEDURE — 97130 THER IVNTJ EA ADDL 15 MIN: CPT

## 2021-10-13 PROCEDURE — 99232 SBSQ HOSP IP/OBS MODERATE 35: CPT | Performed by: PHYSICAL MEDICINE & REHABILITATION

## 2021-10-13 RX ADMIN — CARVEDILOL 25 MG: 25 TABLET, FILM COATED ORAL at 22:05

## 2021-10-13 RX ADMIN — CLONIDINE HYDROCHLORIDE 0.1 MG: 0.1 TABLET ORAL at 14:55

## 2021-10-13 RX ADMIN — PANTOPRAZOLE SODIUM 20 MG: 20 TABLET, DELAYED RELEASE ORAL at 06:20

## 2021-10-13 RX ADMIN — CLONIDINE HYDROCHLORIDE 0.1 MG: 0.1 TABLET ORAL at 08:48

## 2021-10-13 RX ADMIN — BUTALBITAL, ACETAMINOPHEN, AND CAFFEINE 1 TABLET: 50; 325; 40 TABLET ORAL at 21:02

## 2021-10-13 RX ADMIN — BUTALBITAL, ACETAMINOPHEN, AND CAFFEINE 1 TABLET: 50; 325; 40 TABLET ORAL at 02:01

## 2021-10-13 RX ADMIN — HEPARIN SODIUM 7500 UNITS: 10000 INJECTION INTRAVENOUS; SUBCUTANEOUS at 08:48

## 2021-10-13 RX ADMIN — ACETAMINOPHEN 1000 MG: 500 TABLET ORAL at 18:51

## 2021-10-13 RX ADMIN — GABAPENTIN 400 MG: 400 CAPSULE ORAL at 14:55

## 2021-10-13 RX ADMIN — GABAPENTIN 400 MG: 400 CAPSULE ORAL at 22:06

## 2021-10-13 RX ADMIN — SULFAMETHOXAZOLE AND TRIMETHOPRIM 1 TABLET: 800; 160 TABLET ORAL at 08:49

## 2021-10-13 RX ADMIN — ACETAMINOPHEN 1000 MG: 500 TABLET ORAL at 10:27

## 2021-10-13 RX ADMIN — ACETAMINOPHEN 1000 MG: 500 TABLET ORAL at 02:01

## 2021-10-13 RX ADMIN — HEPARIN SODIUM 7500 UNITS: 10000 INJECTION INTRAVENOUS; SUBCUTANEOUS at 22:06

## 2021-10-13 RX ADMIN — CARVEDILOL 25 MG: 25 TABLET, FILM COATED ORAL at 08:47

## 2021-10-13 RX ADMIN — SULFAMETHOXAZOLE AND TRIMETHOPRIM 1 TABLET: 800; 160 TABLET ORAL at 22:05

## 2021-10-13 RX ADMIN — GABAPENTIN 400 MG: 400 CAPSULE ORAL at 08:49

## 2021-10-13 RX ADMIN — HEPARIN SODIUM 7500 UNITS: 10000 INJECTION INTRAVENOUS; SUBCUTANEOUS at 14:55

## 2021-10-13 RX ADMIN — HYDROCORTISONE: 25 CREAM TOPICAL at 22:12

## 2021-10-13 RX ADMIN — DOCUSATE SODIUM 50 MG AND SENNOSIDES 8.6 MG 2 TABLET: 8.6; 5 TABLET, FILM COATED ORAL at 08:48

## 2021-10-13 RX ADMIN — CLONIDINE HYDROCHLORIDE 0.1 MG: 0.1 TABLET ORAL at 22:06

## 2021-10-13 ASSESSMENT — PAIN DESCRIPTION - ORIENTATION
ORIENTATION: ANTERIOR

## 2021-10-13 ASSESSMENT — PAIN DESCRIPTION - DESCRIPTORS
DESCRIPTORS: DISCOMFORT
DESCRIPTORS: CRUSHING;DISCOMFORT;POUNDING

## 2021-10-13 ASSESSMENT — PAIN SCALES - GENERAL
PAINLEVEL_OUTOF10: 0
PAINLEVEL_OUTOF10: 10
PAINLEVEL_OUTOF10: 9
PAINLEVEL_OUTOF10: 4
PAINLEVEL_OUTOF10: 8
PAINLEVEL_OUTOF10: 0
PAINLEVEL_OUTOF10: 7

## 2021-10-13 ASSESSMENT — PAIN DESCRIPTION - PAIN TYPE
TYPE: CHRONIC PAIN
TYPE: CHRONIC PAIN
TYPE: ACUTE PAIN
TYPE: CHRONIC PAIN

## 2021-10-13 ASSESSMENT — PAIN DESCRIPTION - ONSET
ONSET: ON-GOING

## 2021-10-13 ASSESSMENT — PAIN DESCRIPTION - LOCATION
LOCATION: HEAD

## 2021-10-13 ASSESSMENT — PAIN - FUNCTIONAL ASSESSMENT
PAIN_FUNCTIONAL_ASSESSMENT: ACTIVITIES ARE NOT PREVENTED
PAIN_FUNCTIONAL_ASSESSMENT: ACTIVITIES ARE NOT PREVENTED

## 2021-10-13 ASSESSMENT — PAIN DESCRIPTION - FREQUENCY
FREQUENCY: INTERMITTENT
FREQUENCY: CONTINUOUS

## 2021-10-13 ASSESSMENT — PAIN DESCRIPTION - PROGRESSION
CLINICAL_PROGRESSION: GRADUALLY WORSENING
CLINICAL_PROGRESSION: GRADUALLY IMPROVING

## 2021-10-13 NOTE — PATIENT CARE CONFERENCE
[facility] NOTE/PATIENT PLAN OF CARE    The physician was present and led this team conference    Date: 10/13/2021  Admission date: 10/11/2021  Patient Name: Jens Connolly        MRN: [id number]    : 1975  (48 y.o.)  Gender: female   Rehab diagnosis/surgery with date:  [address], non-traumatic 21  Impairment Group Code:  1.4      MEDICAL/FUNCTIONAL HISTORY/STATUS:  had 7 days of Keppra , transferred to [facility] for ICU bed, BP has been high, ongoing headaches, severe morbid obesity with BMI of 59.91, has a bariatric bed, photosensitive    Consultations/Labs/X-rays: UTI, on Bactrim      MEDICATION UPDATE:  as above, oral Bactrim DS for urinary tract infection-stop date 10/14/21      NURSING :    Bowel:   Always Continent  []   Occasionally incontinent  []   Frequently incontinent  []   Always incontinent  []   Not occurred  [x]     Bladder:   Always Continent  [x]    Incontinent less than daily[]   Incontinent  daily []   Always incontinent  []   No urine output    []   Indwelling catheter []       Toilet Hygiene:   Current level : max assist  Short term Toilet hygiene goal: mod assist  Long term toilet hygiene goal:  standby assist    Skin integrity: uppers are bruised  Pain: headache pain, on oxy-ir and Fioricet    NUTRITION    Diet  general  Liquid consistency   thin    SOCIAL INFORMATION:  Lives with: alone  Prior community services:  none  Home Architecture:  2 story condo, 5 entry 1 rail via garage entrance, bed and bath on 2nd floor  Prior Level of function:  independent  DME:  wheeled walker, commode, elevated toilet seat, recent knee surgery 2021    FAMILY / PATIENT EDUCATION:  safety and self care are ongoing    PHYSICAL THERAPY    Bed mobility:   Current level: standby assist-max assist for sit to supine due to obesity  Short term bed mobility goal: standby assist  Long term bed mobility goal: independent    Chair/bed transfers:  Current level: min assist with wheeled walker  Short term Chair/bed transfers goal: standby assist  Long term Chair/bed transfers goal: Modified independent      Ambulation:   Current level: 50 ft bariatric wheeled walker at min assist  Short term ambulation goal: 300 ft at standby assist  Long term ambulation goal: 300 ft at Modified independent      Car transfers:   Current level: does not fit in our car    Stairs:   Current level : 8 with 2 rails at min assist  Short term stairs goal: 12 at standby assist  Long term stairs goal: 12 at Modified independent      Other comments: ORTIZ  balance test : 43/56-mod fall risk       OCCUPATIONAL THERAPY:      Tub/shower:   Current level: with bariatric wheeled walker and bench- min assist  Short term tub/shower goal: Contact guard assist  Long term tub/shower goal: supervision      Feeding:  Current level: supervision  Short term feeding goal: Modified independent  Long term feeding goal: independent    Grooming:   Current level: supervision  Short term grooming goal: Modified independent  Long term grooming goal: Modified independent    Bathing:  Current level: mod assist, will issue hip kit  Short term bathing goal: min assist  Long term bathing goal: supervision    Homemaking:   Current level: to be assessed    Upper body dressing:  Current level: min assist  Short term upper body dressing goal: supervision  Long term upper body dressing goal: independent    Lower body dressing:                                                                          Current level: with reacher mod assist             Short term lower body dressing goal: min assist          Long term lower body dressing goal: Modified independent            Footwear  Current level: max assist with shoe horn  Short term goal: mod assist  Long term goal:Modified independent      Toilet transfer:   Current level: with wheeled walker is min assist, verbal cues  Short term toilet transfer goal: Contact guard assist  Long term toilet transfer goal: Modified independent      Other comments: labile, Dr. Tavo Moe consulted    SPEECH THERAPY  Swallowing:  Current level:  Modified independent  Short term swallowing goal: Modified independent  Long term swallowing Goal: Modified independent    Comprehension:   Current level: Modified independent-supervision  Short term comprehension goal: Modified independent  Long term comprehension goal: independent    Expression:   Current level: Modified independent-supervision  Short term expression goal: Modified independent  Long term expression goal: independent    Problem solving:   Current level: min assist  Short term problem solving goal: supervision  Long term problem solving goal: Modified independent    Memory:  Current level: min assist  Short term memory goal: supervision  Long term memory goal: Modified independent    Social interaction:  Modified independent, goal independent    Safety awareness: fair    Comments: easily distracted, trouble concentrating    Patient/family's personal goals: \"be normal, cook dinner\"  Factors supporting goal achievement:  prior level of function  Factors hindering goal achievement:  morbid obesity      Discharge Plan   Estimated Length of Stay: 10-14 days            Destination:  home  Services at Discharge: to be assessed  Equipment at Discharge: to be assessed      INTERDISCIPLINARY TEAM/PHYSICIAN RECOMMENDATION AND/OR REVISIONS OF PLAN OF CARE:  continue working towards goals, schedule family education with mother      I approve the established interdisciplinary plan of care as documented within the medical record of Leatha [facility]. Electronically signed by Ladonna Sanchez RN  on 10/13/2021 at 9:09 AM  The following interdisciplinary team members were present:  Loyd Koyanagi, RN [place].  Alexandar Daley, DPT  JARON Glez, SLP

## 2021-10-13 NOTE — CARE COORDINATION
Spoke with [facility] -  for [facility] [phone] direct line. Preferred for [place] are:    [facility]:  [facility] [phone] or [facility] or [facility].     RIMMA Chen

## 2021-10-13 NOTE — CONSULTS
Marylynn Holstein Psy. D. Psychologist  10/13/2021  5:13 PM      Date of Service:  10/13/2021    Reason for Consult:  Adjustment   Referring Provider: Sobeida Lin MD    Patient declined need for psychological services at this time. Patient agreed to have psychologist check with her when back on the unit. Please contact psychologist if patient changes her mind and wishes to initiate services. Thank you. Contact Marylynn Holstein, PSYD as needed. Electronically signed by Marylynn Holstein, PSYD, Psy.D.   Licensed Psychologist [id number]

## 2021-10-13 NOTE — PROGRESS NOTES
Speech Language Pathology  ACUTE REHABILITATION--DAILY PROGRESS NOTE          PATIENT NAME:  Carroll Melendez  (female)                MRN:  [id number]                       :  1975  (48 y.o.)  STATUS:  Inpatient: Room 5514/5514-B                     TODAY'S DATE:  10/12/2021  REFERRING PROVIDER:    Joel Cohen Md      SPECIFIC PROVIDER ORDER: SLP eval and treat  Date of order:  10/11/21  REASON FOR REFERRAL: Evaluation s/p SAH  EVALUATING THERAPIST: BOBBI Perez     ADMITTING DIAGNOSIS: SAH [facility]ach[facility]) ([facility] [I60.9]     VISIT DIAGNOSIS:          SPEECH THERAPY  PLAN OF CARE   The speech therapy  POC is established based on physician order, speech pathology diagnosis and results of clinical assessment      SPEECH PATHOLOGY DIAGNOSIS:    Mild Cognitive Impairment     Speech Pathology intervention is recommended 3-6 times per week for LOS or when goals are met with emphasis on the following:       Conditions Requiring Skilled Therapeutic Intervention for speech, language and/or cognition     Cognitive linguistic impairment     Specific Speech Therapy Interventions to Include: Therapeutic tasks for Cognition     Specific instructions for next treatment:                 To initiate POC  To initiate memory tasks  To initiate thought organization tasks     SHORT/LONG TERM GOALS  Pt will improve attention, immediate, short term, recent memory during structured and unstructured tasks with 95% accuracy   Pt will improve problem solving/thought organization during structured and unstructured tasks with 95% accuracy      Patient stated goals: Agreed with above,      Rehabilitation Potential/Prognosis: excellent       FIMS    Swallowing                          Current Status             7--Modified Independent                       Short Term Goal         6--Modified Independent                       Long Term Goal          7--Independent                Receptive Current Status             5--Supervision /Modified Independent              Short Term Goal         6--Modified Independent                       Long Term Goal          7--Independent                Expressive                          Current Status             5--Supervision / Modified Independent             Short Term Goal         6--Modified Independent                       Long Term Goal          7--Independent                Social Interaction                          Current Status             6--Modified Independent                       Short Term Goal         6--Modified Independent                       Long Term Goal          7--Independent                                                               Problem Solving                          Current Status             4--Minimal Assistance               Short Term Goal         5--Supervision               Long Term Goal          6--Modified Independent                          Memory                          Current Status             4--Minimal Assistance               Short Term Goal         5--Supervision               Long Term Goal          6- Modified Independent                     SWALLOWING:      Diet:  Regular consistency solids with thin liquids (IDDSI level 0)     Requires Supervision during meals d/t distractability     LANGUAGE:    Good expression and understanding of wants/needs/health care circumstances. Decreased topic maintenance requiring redirection back to topic         COGNITION:       Good outcome with immediate memory task. Patient was prompted to associate a grocery store item with each letter of the alphabet. The visual cue of the alphabet was faded as patient was just going off of saying the alphabet aloud. Patient achieved 100% accuracy    Patient completed a verbal reasoning task by determining advantages and disadvantages of given scenarios. Good outcome with min v/c.      Safety:  fair    SPEECH:     WFL, No Dysarthria, No Dysfluencies    SP recommended after discharge:  NA  Supervision recommended at discharge: None    Will continue SP intervention as per previously established POC. EDUCATION: Speech Pathologist (SLP) completed education with the patient and/or family regarding identified cognitive linguistic deficits and subsequent need for speech pathology intervention. Discussed deficit areas to be targeted by formal intervention and established short/long term goals. Reviewed compensatory strategies to improve functional outcome (as appropriate). Encouraged patient and/or family to engage SLP in structured Q&A session relative to identified deficit areas. Patient and/or family indicated understanding of all information provided via satisfactory verbal response.     Minute Tracking:    Individual therapy:     0 minutes  Concurrent therapy:    45 minutes  Group therapy:     0 minutes  Co-treatment therapy:    0 minutes    Total minutes for 10/13/2021: 45 minutes

## 2021-10-13 NOTE — PROGRESS NOTES
[facility] Physical Medicine and Rehabilitation  Comprehensive Progress Note    Subjective:      Meggan Lizama is a 48 y.o. female admitted to inpatient rehabilitation for impairments and acitivities limitations in ADLs and mobility secondary to [facility]. No acute events overnight. No cp, sob, n/v. Tolerating therapy. Reports intermittent headaches persist, no worsening. No other complaints. The patient's medical records have been reviewed. Scheduled Meds:acetaminophen, 1,000 mg, Q8H  carvedilol, 25 mg, BID  cloNIDine, 0.1 mg, TID  gabapentin, 400 mg, TID  heparin (porcine), 7,500 Units, Q8H  hydrocortisone, , BID  pantoprazole, 20 mg, QAM AC  sennosides-docusate sodium, 2 tablet, Daily  sulfamethoxazole-trimethoprim, 1 tablet, 2 times per day  lidocaine, 1 patch, Daily      Continuous Infusions:  PRN Meds:polyethylene glycol, 17 g, Daily PRN  cyclobenzaprine, 10 mg, TID PRN  butalbital-acetaminophen-caffeine, 1 tablet, BID PRN  traZODone, 50 mg, Nightly PRN  oxyCODONE, 5 mg, Q6H PRN   Or  oxyCODONE, 10 mg, Q6H PRN         Objective:      Vitals:    10/12/21 0945 10/12/21 1331 10/12/21 2045 10/13/21 0847   BP: 111/89 104/81 108/76 122/62   Pulse: 93 73 78 73   Resp: 18   18   Temp: 97.1 °F (36.2 °C)   97.1 °F (36.2 °C)   TempSrc: Tympanic   Temporal   SpO2:    98%   Weight:       Height:         General appearance: Alert, NAD  Eyes: conjunctivae/corneas clear. PERRL, EOM's intact. Lungs: clear to auscultation bilaterally  Heart: regular rate and rhythm, S1, S2 normal  Abdomen: soft, non-tender, normal bowel sounds   Musculoskeletal: Range of motion normal and no gross abnormalities. Skin:  No rashes or lesions  Neurologic: Alert, oriented. Strength 3/5 bilateral HF; 4/5 bilateral KE, DF, PF  5/5 throughout bilateral upper extremities.        Laboratory:    Lab Results   Component Value Date    WBC 5.6 10/12/2021    HGB 10.7 (L) 10/12/2021    HCT 33.8 (L) 10/12/2021    MCV 86.0 10/12/2021     10/12/2021     Lab Results   Component Value Date     10/12/2021    K 4.1 10/12/2021     10/12/2021    CO2 20 10/12/2021    BUN 11 10/12/2021    CREATININE 0.8 10/12/2021    GLUCOSE 99 10/12/2021    CALCIUM 9.0 10/12/2021      Lab Results   Component Value Date    ALT 36 (H) 10/06/2021    AST 17 10/06/2021    ALKPHOS 94 10/06/2021    BILITOT 0.5 10/06/2021       Lab Results   Component Value Date    COLORU Yellow 09/27/2021    NITRU Negative 09/27/2021    GLUCOSEU Negative 09/27/2021    KETUA 15 09/27/2021    UROBILINOGEN 0.2 09/27/2021    BILIRUBINUR Negative 09/27/2021       Functional Status:   Physical Therapy:  Bed mobility: SBA  Transfers: Min A  Ambulation: 50 ft bariatric [employer] Min A     Occupational Therapy:  Feeding: Supervision  Grooming: SBA  UB dressing: Min A  LB dressing: Max A     Speech Therapy  Mild cognitive impairment        Assessment/Plan:       48 y.o. female admitted to inpatient rehabilitation for impairments and acitivities limitations in ADLs and mobility secondary to [facility].      -Nontraumatic SAH: Evaluated by NSGY, no acute surgical intervention. Completed [facility] for seizure prophylaxis, medication was dc'd at [facility]. Monitor Neuro exam. Begin Acute Rehab evaluations  -Pain control, chronic back pain, headaches: Oxycodone PRN, flexeril PRN, Tylenol, Lidoderm, gabapentin, prn Fioricet (short-term use)  -UTI: Continue Bactrim to complete course  -HTN: Continue Coreg, clonidine. Monitor BP--controlled   -Morbid obesity: lifestyle modifications recommended  -GERD: Continue protonix  -DVT prophylaxis: heparin SQ    BP is controlled   Add Fioricet PRN for headaches.  Discussed with patient that goal is to use for short term only  Will schedule Tylenol for now  Psychology consult for adjustment, coping     Electronically signed by Tracy Simpson MD on 10/13/2021 at 12:28 PM

## 2021-10-13 NOTE — PROGRESS NOTES
Physical Therapy  Treatment Note    Evaluating Therapist: Nilda Velazquez PT, [id number]    ROOM: [address] [place] [address]  DIAGNOSIS: SAH  PRECAUTIONS: Falls, SBP < 140  HPI:  49 yo female with history of HTN, HLD, DM, GERD, hx R TKR 7/19/2021 & ESEQUIEL, Bipolar presented to the hospital with photophobia, phonophobia, headache, and neck stiffness. She was found to have a nontraumatic subarachnoid hemorrhage. She had some seizure-like activity in the ED and was medicated with benzodiazepines. Social:  Pt lives alone in a 2 floor plan 2 steps and no rail. Bed and bath on 1st or  2nd floor. 4 stairs to 2nd floor with bilateral rails. States she has \"Plenty\" of friends and family available to assist PRN. Prior to admission: Pt used Rollator on first floor, no device on 2nd floor and SPC in community. Independent PTA     Initial Evaluation  Date: 10/12/21 AM     PM    Short Term Goals Long Term Goals    Was pt agreeable to Eval/treatment? Yes yes yes     Does pt have pain?  10/10 low back and headache Moderate pain in low back Moderate pain in low back     Bed Mobility  Rolling: SBA  Supine to sit: SBA  Sit to supine: MaxA  Scooting: SBA NT Rolling: Supervision  Supine to sit: Supervision  Sit to supine: NT  Scooting: Supervision SBA Independent     Transfers Sit to stand: Ezra  Stand to sit: Ezra  Stand pivot: [employer] [address] [facility] [place] [address] [facility] J[facility] SBA   Modified Independent    AAD   Ambulation    50 feet with Ezra with [employer]    10mWT: .73 m/s  6mWT: 48 m 200' Supervision Frakes 88 [facility] J[facility] 200' x 4 Jamar [facility] J[facility] Supervision (walking her service poodle) 300 feet with SBA with AAD     300 feet with Modified Independent   with AAD    10mWT: 1.0 m/s  6mWT: 200m   Walking 10 feet on uneven surface 10 feet with Ezra with [employer] 88 [facility] J[facility] NT NT 10 feet with SBA with AAD 10 feet with Modified Independent   with AAD   Wheel Chair Mobility 150 Independent   NT NT     Car Transfers NT NT NT SBA Modified Independent     Stair negotiation: ascended and descended  8 steps with B rail with Ezra NT NT 12 steps with single rail with SBA 12 steps with single rail with Modified Independent     Curb Step:   ascended and descended 4 inch step with Ezra and [place] [employer] NT NT 4 inch step with SBA and [place] [employer] 4 inch step with Modified Independent   and Jamar [employer]   Picking up object off the floor NT  NT Will  shoe with SBA assist Will  shoe with  Independent   assist   BLE ROM WFL       BLE Strength 2/5 B hips (pain in back)  4/5 hip extension, knees, ankle       Balance  Ezra Jamar [employer] standing     BBS: 43/56     SBA jamar [employer] dynamic Supervision jamar [employer] dynamic      BBS: >45/56     Date Family Teach Completed        Is additional Family Teaching Needed? Y or N Y Y Y     Hindering Progress Obesity, cognition, Weakness, Pain Obesity,  Weakness, Pain Obesity,  Weakness, Pain     PT recommended ELOS 10 days 10/15 10/15     Team's Discharge Plan        Therapist at Team Meeting          A&O x 4. Reports her cognition and speech has improved. States its close to baseline. ASSESSMENT  Pt displays functional ability as noted in the objective portion of this evaluation. Comments:  Pt frustrated by team meeting estimated length of stay of 10-14 days. Pt reports that she already feels better in regards to her pain, states today that her current back pain feels like her familiar chronic pain which is managed by  at [facility]. Pt with significant improvement in activity tolerance. Pt also states her cognition has improved close to her baseline. Pt demonstrates good balance when mobilizing. In PM, pt has service dog with her. Pt requesting to ambulate with [employer] and service poodle. Pts ambulation pattern steady, with no gross LOB even when managing dog. Pt with another improvement in overall activity. Patient education  Pt educated on role of PT.      Patient response to education:   Pt verbalized understanding Pt demonstrated skill Pt requires further education in this area   x x x     Rehab potential is Good for reaching above PT goals. Pts/ family goals   1. Return to PLOF     Patient and or family understand(s) diagnosis, prognosis, and plan of care. yes    PLAN  PT care will be provided in accordance with the objectives noted above. Whenever appropriate, clear delegation orders will be provided for nursing staff. Exercises and functional mobility practice will be used as well as appropriate assistive devices or modalities to obtain goals. Patient and family education will also be administered as needed. Frequency of treatments will be 2x/day M-F and 1x/day Sat or Sun x 10 days.     Time in: 1045  Time out: 1130    Time in: 1515  Time out: Serge Paris PT, DPT  [id number]

## 2021-10-13 NOTE — CARE COORDINATION
Per Team: Pt will be discharged in 10-14 days. Updated the pt and mother Sheryl Sherman LTG: Stand by assistance to Independent. Pt is not on insulin, not on iv-abx and not on O2. Pt has c/o back pain and has some distractibility while eating. Pt is not happy about her length of stay and does not feel she needs to be here for 10-14 days. DME: Pt has what is needed. Aftercare: Will discuss closer to discharge. Family Teaching: 10/21 pm with mother.     Florence SONG intern  Adryan Pimentel, [facility] 10/13/2021

## 2021-10-13 NOTE — PROGRESS NOTES
Occupational Therapy  OCCUPATIONAL THERAPY DAILY NOTE    Date:10/13/2021  Patient Name: Meggan Lizama  MRN: [id number]  : 1975  Room: [address] [place], [place] [address]     Referring Practitioner: Maryjo Stoner MD  Diagnosis: SAH- CVA- R occipital  Pertinent Past Medical History: HTN, HLD, DM, GERD, hx R TKR 2021 & ESEQUIEL  Restrictions: falls    Functional Assessment:   Date Status AE  Comments   Feeding 10/12/21 Supervision      Grooming 10/13/21 SUP   standing for hand hygiene         Oral Care 10/12/21 Supervision      Bathing 10/12/21 Moderate Assist      UB Dressing 10/12/21 Minimal Assist      LB Dressing 10/12/21 Moderate Assist  reacher    Footwear 10/12/21 Maximal Assist  Shoe horn    Toileting 10/13/21 SBA Toilet tongs Pt wanted total privacy while she was in the bathroom and did not want therapist in there. Per pt report she used the toileting aide for her hygiene. She required no assist with pants management   Homemaking 10/12/21  TBA       Functional Transfers / Balance:   Date Status DME  Comments   Sit Balance 10/13/21 Supervision      Stand Balance 10/13/21 Sup Bariatric rw    [] Tub  [x] Shower   Transfer 10/12/21 Minimal Assist  Bariatric rw & bench    Commode   Transfer 10/13/21 SUP Bariatric rw    Functional   Mobility 10/13/21 SUP \" Pt declined use of cheyanne ww to/from bathroom and around OT gym   Other: supine>sit 10/12/21/ SBA       Functional Exercises / Activity:  Pt supine in bed upon arrival to . Completed supine-sit EOB. Completed toileting task in bathroom with increased time. In OT gym, Engaged in cornhole toss, standing, to increase standing balance/tolerance. Pt able to throw and  items at SBA using reacher to limit pressure at back and promote of use reacher for home. Engaged in therex using yellow edward, 3x15 reps (supination/pronation) to increase BUE strength for ease with functional transfers. Participated in armbike exercise, seated, 2x5 mins, to increase endurance for ease with ADLs. Participated in shoulder ladder, placing/removing nuts/bolts to increase fine motor coordination and ROM. Pt demo'ing F+ coordination. Pt appeared to have tolerated session well but continues to be limited by back pain. Sensory / Neuromuscular Re-Education:      Cognitive Skills:   Status Comments   Problem   Solving fair  During ADL   Memory fair  \"   Sequencing good  \"   Safety fair  \"     Visual Perception:    Education:  Pt educated with regards to how to use AE to facilitate LB dressing    [] Family teach completed on:    Pain Level:9/10 low back    Additional Notes:       Patient has made good  progress during treatment sessions toward set goals. Therapy emphasis to obtain goals:ADL retraining, transfers training, functional mobility, homemaking, endurance/balance retraining, therex, FMC/dexterity & pt/family education       [x] Continue with current OT Plan of care. [] Prepare for Discharge    Goals  Long term goals  Time Frame for Long term goals : 10 days to address above problem areas  Long term goal 1: Pt demo independent to eat all meals  Long term goal 2: Pt demo Mod I grooming standing @ sink level  Long term goal 3: Pt demo Mod I bathing @ shower level both seated & standing  Long term goal 4: Pt demo I UE & Mod I LE dress using AE to don underwear, pants, socks & shoes  Long term goal 5: Pt demo Mod I commode trf with appropriate DME to ensure pt safety.  Pt demo Mod I toileting & demo G safety & insight  Long term goals 6: Pt demo Mod I  tub trf using appropriate DME to ensure pt safety  Long term goal 7: Pt demo Mod I light homemamking task  Long term goal 8: Pt demo G endurance for a 20-30 minute functional activity  Long term goal 9: Pt demo improved [facility] & dexterity to improve pt ability to complete ADLs & IADLs as evidenced by gains in the followin-hole peg test- L hand 29.5 seconds & norm for pt age & gender is 18.0 seconds & R hand 24.0 seconds &  strength L hand 50# & norm for pt age & gender is 56# & R hand 54# & norm for pt age & gender is 62# & norm f  Patient Goals   Patient goals : \"Be able to be normal- make dinner. \"  10/13/21- Pt plan of care updated on this date.  Pt tentative length of stay is 10-14 days  Carolinaon Paresh OTR/L [id number]     DISCHARGE RECOMMENDATIONS  Recommended DME:    Post Discharge Care:   []Home Independently  []Home with 24hr Care / Supervision []Home with Partial Supervision []Home with Home Health OT []Home with Out Pt OT []Other: ___   Comments:         Time in Time out Tx Time Breakdown  Variance:   First Session  0915 1000 [] Individual Tx-   [x] Concurrent Tx -45  [] Co-Tx -   [] Group Tx -   [] Time Missed -     Second Session 1000 1045 [] Individual Tx-   [x] Concurrent Tx -45   [] Co-Tx -   [] Group Tx -   [] Time Missed -     Third Session    [] Individual Tx-   [] Concurrent Tx -  [] Co-Tx -   [] Group Tx -   [] Time Missed -         Total Tx Time:   [address], OTR/L [id number]

## 2021-10-14 PROCEDURE — 6370000000 HC RX 637 (ALT 250 FOR IP): Performed by: PHYSICAL MEDICINE & REHABILITATION

## 2021-10-14 PROCEDURE — 6360000002 HC RX W HCPCS: Performed by: PHYSICAL MEDICINE & REHABILITATION

## 2021-10-14 PROCEDURE — 1280000000 HC REHAB R&B

## 2021-10-14 PROCEDURE — 97530 THERAPEUTIC ACTIVITIES: CPT

## 2021-10-14 PROCEDURE — 99232 SBSQ HOSP IP/OBS MODERATE 35: CPT | Performed by: PHYSICAL MEDICINE & REHABILITATION

## 2021-10-14 PROCEDURE — 97129 THER IVNTJ 1ST 15 MIN: CPT

## 2021-10-14 PROCEDURE — 97130 THER IVNTJ EA ADDL 15 MIN: CPT

## 2021-10-14 PROCEDURE — 97535 SELF CARE MNGMENT TRAINING: CPT

## 2021-10-14 RX ORDER — DULOXETIN HYDROCHLORIDE 60 MG/1
60 CAPSULE, DELAYED RELEASE ORAL DAILY
Status: DISCONTINUED | OUTPATIENT
Start: 2021-10-14 | End: 2021-10-15 | Stop reason: HOSPADM

## 2021-10-14 RX ORDER — GUAIFENESIN 100 MG/5ML
200 SOLUTION ORAL EVERY 6 HOURS PRN
Status: DISCONTINUED | OUTPATIENT
Start: 2021-10-14 | End: 2021-10-15 | Stop reason: HOSPADM

## 2021-10-14 RX ADMIN — GABAPENTIN 400 MG: 400 CAPSULE ORAL at 09:16

## 2021-10-14 RX ADMIN — CLONIDINE HYDROCHLORIDE 0.1 MG: 0.1 TABLET ORAL at 21:53

## 2021-10-14 RX ADMIN — GUAIFENESIN 200 MG: 200 SOLUTION ORAL at 21:52

## 2021-10-14 RX ADMIN — DOCUSATE SODIUM 50 MG AND SENNOSIDES 8.6 MG 2 TABLET: 8.6; 5 TABLET, FILM COATED ORAL at 09:16

## 2021-10-14 RX ADMIN — HYDROCORTISONE: 25 CREAM TOPICAL at 09:15

## 2021-10-14 RX ADMIN — PANTOPRAZOLE SODIUM 20 MG: 20 TABLET, DELAYED RELEASE ORAL at 07:01

## 2021-10-14 RX ADMIN — HYDROCORTISONE: 25 CREAM TOPICAL at 21:56

## 2021-10-14 RX ADMIN — SULFAMETHOXAZOLE AND TRIMETHOPRIM 1 TABLET: 800; 160 TABLET ORAL at 09:16

## 2021-10-14 RX ADMIN — SULFAMETHOXAZOLE AND TRIMETHOPRIM 1 TABLET: 800; 160 TABLET ORAL at 21:53

## 2021-10-14 RX ADMIN — ACETAMINOPHEN 1000 MG: 500 TABLET ORAL at 18:10

## 2021-10-14 RX ADMIN — TRAZODONE HYDROCHLORIDE 50 MG: 50 TABLET ORAL at 21:52

## 2021-10-14 RX ADMIN — GABAPENTIN 400 MG: 400 CAPSULE ORAL at 21:52

## 2021-10-14 RX ADMIN — CLONIDINE HYDROCHLORIDE 0.1 MG: 0.1 TABLET ORAL at 09:16

## 2021-10-14 RX ADMIN — HEPARIN SODIUM 7500 UNITS: 10000 INJECTION INTRAVENOUS; SUBCUTANEOUS at 07:01

## 2021-10-14 RX ADMIN — CLONIDINE HYDROCHLORIDE 0.1 MG: 0.1 TABLET ORAL at 13:46

## 2021-10-14 RX ADMIN — CARVEDILOL 25 MG: 25 TABLET, FILM COATED ORAL at 09:16

## 2021-10-14 RX ADMIN — ACETAMINOPHEN 1000 MG: 500 TABLET ORAL at 04:27

## 2021-10-14 RX ADMIN — DULOXETINE HYDROCHLORIDE 60 MG: 60 CAPSULE, DELAYED RELEASE ORAL at 17:08

## 2021-10-14 RX ADMIN — HEPARIN SODIUM 7500 UNITS: 10000 INJECTION INTRAVENOUS; SUBCUTANEOUS at 21:52

## 2021-10-14 RX ADMIN — HEPARIN SODIUM 7500 UNITS: 10000 INJECTION INTRAVENOUS; SUBCUTANEOUS at 13:47

## 2021-10-14 RX ADMIN — CARVEDILOL 25 MG: 25 TABLET, FILM COATED ORAL at 21:52

## 2021-10-14 RX ADMIN — GABAPENTIN 400 MG: 400 CAPSULE ORAL at 13:46

## 2021-10-14 ASSESSMENT — PAIN SCALES - GENERAL
PAINLEVEL_OUTOF10: 3
PAINLEVEL_OUTOF10: 0
PAINLEVEL_OUTOF10: 5

## 2021-10-14 NOTE — PROGRESS NOTES
Occupational Therapy  OCCUPATIONAL THERAPY DAILY NOTE    Date:10/14/2021  Patient Name: Leatha Funes  MRN: [id number]  : 1975  Room: [address] [place], [place] [address]     Referring Practitioner: Yomaira Kim MD  Diagnosis: SAH- CVA- R occipital  Pertinent Past Medical History: HTN, HLD, DM, GERD, hx R TKR 2021 & ESEQUIEL  Restrictions: falls    Functional Assessment:   Date Status AE  Comments   Feeding 10/14/21 independent     Grooming 10/14/21 independent  standing for hand hygiene         Oral Care 10/14/21 independent  Standing to brush her teeth   Bathing 10/14/21 Min A Long handled sponge Assist to untangle her hair due to matted hair from being hospitalized   UB Dressing 10/14/21 s/u     LB Dressing 10/14/21 Sup  To don shorts   Footwear 10/14/21 Min A  Shoe horn To don    Toileting 10/14/21 Mod I Toilet tongs Pt wanted total privacy while she was in the bathroom and did not want therapist in there. Per pt report she used the toileting aide for her hygiene. She required no assist with pants management   Homemaking 10/12/21  TBA       Functional Transfers / Balance:   Date Status DME  Comments   Sit Balance 10/14/21 independent     Stand Balance 10/14/21 Sup No device    [] Tub  [x] Shower   Transfer 10/14/21 SBA  bench    Commode   Transfer 10/14/21 SUP No device    Functional   Mobility 10/14/21 SUP \" Ambulating in her room & bathroom   Other: supine>sit 10/14/21/ independent       Functional Exercises / Activity:      Sensory / Neuromuscular Re-Education:      Cognitive Skills:   Status Comments   Problem   Solving fair  During ADL   Memory fair  \"   Sequencing good  \"   Safety fair  \"     Visual Perception:    Education:  Pt educated with regards to energy conservation & pacing strategies to implement @ home    [] Family teach completed on:    Pain Level:Pt did c/o back pain    Additional Notes:       Patient has made good  progress during treatment sessions toward set goals.  Therapy emphasis to obtain goals:ADL retraining, transfers training, functional mobility, homemaking, endurance/balance retraining, therex, FMC/dexterity & pt/family education       [x] Continue with current OT Plan of care. [] Prepare for Discharge    Goals  Long term goals  Time Frame for Long term goals : 10 days to address above problem areas  Long term goal 1: Pt demo independent to eat all meals  Long term goal 2: Pt demo Mod I grooming standing @ sink level  Long term goal 3: Pt demo Mod I bathing @ shower level both seated & standing  Long term goal 4: Pt demo I UE & Mod I LE dress using AE to don underwear, pants, socks & shoes  Long term goal 5: Pt demo Mod I commode trf with appropriate DME to ensure pt safety. Pt demo Mod I toileting & demo G safety & insight  Long term goals 6: Pt demo Mod I  tub trf using appropriate DME to ensure pt safety  Long term goal 7: Pt demo Mod I light homemamking task  Long term goal 8: Pt demo G endurance for a 20-30 minute functional activity  Long term goal 9: Pt demo improved [facility] & dexterity to improve pt ability to complete ADLs & IADLs as evidenced by gains in the followin-hole peg test- L hand 29.5 seconds & norm for pt age & gender is 18.0 seconds & R hand 24.0 seconds &  strength L hand 50# & norm for pt age & gender is 56# & R hand 54# & norm for pt age & gender is 62# & norm f  Patient Goals   Patient goals : \"Be able to be normal- make dinner. \"  10/13/21- Pt plan of care updated on this date.  Pt tentative length of stay is 10-14 days  Gabriela Degroot OTR/L [id number]     DISCHARGE RECOMMENDATIONS  Recommended DME:    Post Discharge Care:   []Home Independently  []Home with 24hr Care / Supervision []Home with Partial Supervision []Home with Home Health OT []Home with Out Pt OT []Other: ___   Comments:         Time in Time out Tx Time Breakdown  Variance:   First Session  0915 1000 [x] Individual Tx- 45  [] Concurrent Tx -  [] Co-Tx -   [] Group Tx -   [] Time Missed -     Second Session   [] Individual Tx-   [] Concurrent Tx -   [] Co-Tx -   [] Group Tx -   [x] Time Missed -45          Pt refusal secondary to fatigue    Third Session    [] Individual Tx-   [] Concurrent Tx -  [] Co-Tx -   [] Group Tx -   [] Time Missed -         Total Tx Time:   [address] [place] [address] OTR/L 900 [facility] OTR/L [id number]

## 2021-10-14 NOTE — PROGRESS NOTES
Speech Language Pathology  ACUTE REHABILITATION--DAILY PROGRESS NOTE          PATIENT NAME:  Joe Call  (female)                MRN:  [id number]                       :  1975  (48 y.o.)  STATUS:  Inpatient: Room 5514/5514-B                     TODAY'S DATE:  10/12/2021  REFERRING PROVIDER:    Leo Domingo Md      SPECIFIC PROVIDER ORDER: SLP eval and treat  Date of order:  10/11/21  REASON FOR REFERRAL: Evaluation s/p SAH  EVALUATING THERAPIST: BOBBI Laurent     ADMITTING DIAGNOSIS: SAH [facility]arachnoid [facility]) ([facility] [I60.9]     VISIT DIAGNOSIS:          SPEECH THERAPY  PLAN OF CARE   The speech therapy  POC is established based on physician order, speech pathology diagnosis and results of clinical assessment      SPEECH PATHOLOGY DIAGNOSIS:    Mild Cognitive Impairment     Speech Pathology intervention is recommended 3-6 times per week for LOS or when goals are met with emphasis on the following:       Conditions Requiring Skilled Therapeutic Intervention for speech, language and/or cognition     Cognitive linguistic impairment     Specific Speech Therapy Interventions to Include: Therapeutic tasks for Cognition     Specific instructions for next treatment:                 To initiate POC  To initiate memory tasks  To initiate thought organization tasks     SHORT/LONG TERM GOALS  Pt will improve attention, immediate, short term, recent memory during structured and unstructured tasks with 95% accuracy   Pt will improve problem solving/thought organization during structured and unstructured tasks with 95% accuracy      Patient stated goals: Agreed with above,      Rehabilitation Potential/Prognosis: excellent       FIMS    Swallowing                          Current Status             7--Modified Independent                       Short Term Goal         6--Modified Independent                       Long Term Goal          7--Independent                Receptive Current Status             5--Supervision /Modified Independent              Short Term Goal         6--Modified Independent                       Long Term Goal          7--Independent                Expressive                          Current Status             5--Supervision / Modified Independent             Short Term Goal         6--Modified Independent                       Long Term Goal          7--Independent                Social Interaction                          Current Status             6--Modified Independent                       Short Term Goal         6--Modified Independent                       Long Term Goal          7--Independent                                                               Problem Solving                          Current Status             4--Minimal Assistance               Short Term Goal         5--Supervision               Long Term Goal          6--Modified Independent                          Memory                          Current Status             4--Minimal Assistance               Short Term Goal         5--Supervision               Long Term Goal          6- Modified Independent                     SWALLOWING:      Diet:  Regular consistency solids with thin liquids (IDDSI level 0)     Requires Supervision during meals d/t distractability     LANGUAGE:    Good expression and understanding of wants/needs/health care circumstances. Decreased topic maintenance requiring redirection back to topic         COGNITION:    Occasional v/c were provided for completion of procedural aspects of community reintegration task. Patient was given a grocery store list and asked to determine the best value for the items needed (e.g. 4 rolls of toilet paper for $2.00 or 7 rolls of toilet paper for $3). Patient required occasional v/c for problem solving component of task and no assistance for accurate completion functional math intrinsic to task.  Patient required intermittent prompts to refer to shopping list.    Safety:  Fair/ Fair+    SPEECH:     WFL, No Dysarthria, No Dysfluencies    SP recommended after discharge:  NA  Supervision recommended at discharge: None    Will continue SP intervention as per previously established POC. EDUCATION: Speech Pathologist (SLP) completed education with the patient and/or family regarding identified cognitive linguistic deficits and subsequent need for speech pathology intervention. Discussed deficit areas to be targeted by formal intervention and established short/long term goals. Reviewed compensatory strategies to improve functional outcome (as appropriate). Encouraged patient and/or family to engage SLP in structured Q&A session relative to identified deficit areas. Patient and/or family indicated understanding of all information provided via satisfactory verbal response.     Minute Tracking:    Individual therapy:    0 minutes  Concurrent therapy:    0 minutes  Group therapy:     45 minutes  Co-treatment therapy:    0 minutes    Total minutes for 10/14/2021: 45 minutes

## 2021-10-14 NOTE — PROGRESS NOTES
[facility]  Comprehensive Progress Note    Subjective:      Nader Kim is a 48 y.o. female admitted to inpatient rehabilitation for impairments and acitivities limitations in ADLs and mobility secondary to [facility]. No acute events overnight. No cp, sob, n/v. Headaches improving, only taking the Fioricet rarely, has not taken at all today. No longer taking the oxycodone, states she wants to be off of it before discharge. She requests to resume her home dose Cymbalta. She denies any new issues or complaints. The patient's medical records have been reviewed. Scheduled Meds:DULoxetine, 60 mg, Daily  influenza virus vaccine, 0.5 mL, Prior to discharge  acetaminophen, 1,000 mg, Q8H  carvedilol, 25 mg, BID  cloNIDine, 0.1 mg, TID  gabapentin, 400 mg, TID  heparin (porcine), 7,500 Units, Q8H  hydrocortisone, , BID  pantoprazole, 20 mg, QAM AC  sennosides-docusate sodium, 2 tablet, Daily  sulfamethoxazole-trimethoprim, 1 tablet, 2 times per day  lidocaine, 1 patch, Daily      Continuous Infusions:  PRN Meds:guaiFENesin, 200 mg, Q6H PRN  polyethylene glycol, 17 g, Daily PRN  cyclobenzaprine, 10 mg, TID PRN  butalbital-acetaminophen-caffeine, 1 tablet, BID PRN  traZODone, 50 mg, Nightly PRN  oxyCODONE, 5 mg, Q6H PRN   Or  oxyCODONE, 10 mg, Q6H PRN         Objective:      Vitals:    10/13/21 1455 10/13/21 1510 10/13/21 2205 10/14/21 0832   BP: 122/62  131/63 118/62   Pulse: 73 68 76 69   Resp: 18  (!) 102 18   Temp: 97.1 °F (36.2 °C)  98.2 °F (36.8 °C) 97.4 °F (36.3 °C)   TempSrc: Temporal  Oral Temporal   SpO2:  100%  98%   Weight:       Height:         General appearance: Alert, NAD  Eyes: conjunctivae/corneas clear. PERRL, EOM's intact. Lungs: clear to auscultation bilaterally  Heart: regular rate and rhythm, S1, S2 normal  Abdomen: soft, non-tender, normal bowel sounds   Musculoskeletal: Range of motion normal and no gross abnormalities.   Skin:  No rashes or lesions  Neurologic: Alert, oriented. Strength 3/5 bilateral HF; 4/5 bilateral KE, DF, PF  5/5 throughout bilateral upper extremities. Exam stable     Laboratory:    Lab Results   Component Value Date    WBC 5.6 10/12/2021    HGB 10.7 (L) 10/12/2021    HCT 33.8 (L) 10/12/2021    MCV 86.0 10/12/2021     10/12/2021     Lab Results   Component Value Date     10/12/2021    K 4.1 10/12/2021     10/12/2021    CO2 20 10/12/2021    BUN 11 10/12/2021    CREATININE 0.8 10/12/2021    GLUCOSE 99 10/12/2021    CALCIUM 9.0 10/12/2021      Lab Results   Component Value Date    ALT 36 (H) 10/06/2021    AST 17 10/06/2021    ALKPHOS 94 10/06/2021    BILITOT 0.5 10/06/2021       Lab Results   Component Value Date    COLORU Yellow 09/27/2021    NITRU Negative 09/27/2021    GLUCOSEU Negative 09/27/2021    KETUA 15 09/27/2021    UROBILINOGEN 0.2 09/27/2021    BILIRUBINUR Negative 09/27/2021       Functional Status:   Physical Therapy:  Bed mobility: Supervision   Transfers: Supervision - Mod I  Ambulation: 250 ft bariatric [employer] Supervision - Mod I      Occupational Therapy:  Feeding: Independent   Grooming: Independent   UB dressing: Setup  LB dressing: Supervision         Assessment/Plan:       48 y.o. female admitted to inpatient rehabilitation for impairments and acitivities limitations in ADLs and mobility secondary to [address]    -Nontraumatic SAH: Evaluated by NSGY, no acute surgical intervention. Completed keppra for seizure prophylaxis, medication was dc'd at [facility]. Monitor Neuro exam. Continue Acute Rehab program.   -Pain control, chronic back pain, headaches: Oxycodone PRN (no longer taking), flexeril PRN, Tylenol, Lidoderm, gabapentin, prn Fioricet (short-term use), Cymbalta   -UTI: Continue Bactrim to complete course  -HTN: Continue Coreg, clonidine.  Monitor BP--controlled   -Morbid obesity: lifestyle modifications recommended  -GERD: Continue protonix  -DVT prophylaxis: heparin SQ    Resume home Cymbalta for

## 2021-10-14 NOTE — PROGRESS NOTES
Physical Therapy  Treatment Note    Evaluating Therapist: Daiana Bean PT, [id number]    ROOM: [address] [place] [address]  DIAGNOSIS: SAH  PRECAUTIONS: Falls, SBP < 140  HPI:  47 yo female with history of HTN, HLD, DM, GERD, hx R TKR 7/19/2021 & ESEQUIEL, Bipolar presented to the hospital with photophobia, phonophobia, headache, and neck stiffness. She was found to have a nontraumatic subarachnoid hemorrhage. She had some seizure-like activity in the ED and was medicated with benzodiazepines. Social:  Pt lives alone in a 2 floor plan 2 steps and no rail. Bed and bath on 1st or  2nd floor. 4 stairs to 2nd floor with bilateral rails. States she has \"Plenty\" of friends and family available to assist PRN. Prior to admission: Pt used Rollator on first floor, no device on 2nd floor and SPC in community. Independent PTA     Initial Evaluation  Date: 10/12/21 AM     Short Term Goals Long Term Goals    Was pt agreeable to Eval/treatment? Yes yes     Does pt have pain?  10/10 low back and headache Moderate pain in low back     Bed Mobility  Rolling: SBA  Supine to sit: SBA  Sit to supine: MaxA  Scooting: SBA  rolling  - independent    Supine to sit supervision     Sit to supine supervision     Scooting   supervision  SBA Independent     Transfers Sit to stand: Ezra  Stand to sit: Ezra  Stand pivot: [employer]   Supervision Rhea [employer]  Mod I  SBA   Modified Independent    AAD   Ambulation    50 feet with Ezra with jamar [employer]    10mWT: .73 m/s  6mWT: 50 m 250 feet x 2  Supervision/mod I    Jamar [employer]    Cues safety  On turning walker  300 feet with SBA with AAD     300 feet with Modified Independent   with AAD    10mWT: 1.0 m/s  6mWT: 200m   Walking 10 feet on uneven surface 10 feet with Ezra with Rhea WW NT 10 feet with SBA with AAD 10 feet with Modified Independent   with AAD   Wheel Chair Mobility 150 Independent   NT     Car Transfers NT  simulated jeep transfer with mat table  - sba  SBA Modified Independent     Stair negotiation: ascended and descended  8 steps with B rail with Ezra 8 steps 2 rails with sba/s  Ascend fwd descend bkw    12 steps with single rail with SBA 12 steps with single rail with Modified Independent     Curb Step:   ascended and descended 4 inch step with Ezra and [employer] NT 4 inch step with SBA and [employer] 4 inch step with Modified Independent   and Jamar [employer]   Picking up object off the floor NT  Will  shoe with SBA assist Will  shoe with  Independent   assist   BLE ROM WFL      BLE Strength 2/5 B hips (pain in back)  4/5 hip extension, knees, ankle      Balance  Ezra Jamar WW standing     BBS: 43/56     SBA jamar [employer] dynamic      BBS: >45/56     Date Family Teach Completed       Is additional Family Teaching Needed? Y or N Y Y     Hindering Progress Obesity, cognition, Weakness, Pain Obesity,  Weakness, Pain     PT recommended ELOS 10 days 10/15     Team's Discharge Plan       Therapist at Team Meeting         A&O x 4. Reports her cognition and speech has improved. States it at base      ASSESSMENT  Pt displays functional ability as noted in the objective portion of this evaluation. Comments:  Pt lying in bed upon arrival.   Pt reports her back is sore and she sat in chair for  1 hour and had to lay down. Pt sat up to eob and therapist talking with pt and pt laid back down reports her back. Pt then completed rx as per above. Pt requires cues for safety with turns, she has tendency to whip walker around on turns. Pt demonstrates good balance with activity . Pt declined secondary to not getting any sleep and needing to sleep due to fatigue. Patient education  Pt educated on role of PT. Patient response to education:   Pt verbalized understanding Pt demonstrated skill Pt requires further education in this area   x x x     Rehab potential is Good for reaching above PT goals. Pts/ family goals   1.  Return to PLOF     Patient and or family understand(s) diagnosis, prognosis, and plan of care. yes    PLAN  PT care will be provided in accordance with the objectives noted above. Whenever appropriate, clear delegation orders will be provided for nursing staff. Exercises and functional mobility practice will be used as well as appropriate assistive devices or modalities to obtain goals. Patient and family education will also be administered as needed. Frequency of treatments will be 2x/day M-F and 1x/day Sat or Sun x 10 days.     Time in: 1100  Time out: [address] [facility], [address] [place] [address]

## 2021-10-15 VITALS
HEART RATE: 66 BPM | DIASTOLIC BLOOD PRESSURE: 69 MMHG | TEMPERATURE: 97.4 F | OXYGEN SATURATION: 98 % | BODY MASS INDEX: 44.41 KG/M2 | WEIGHT: 293 LBS | SYSTOLIC BLOOD PRESSURE: 114 MMHG | RESPIRATION RATE: 18 BRPM | HEIGHT: 68 IN

## 2021-10-15 PROCEDURE — 97130 THER IVNTJ EA ADDL 15 MIN: CPT

## 2021-10-15 PROCEDURE — 97535 SELF CARE MNGMENT TRAINING: CPT

## 2021-10-15 PROCEDURE — 97129 THER IVNTJ 1ST 15 MIN: CPT

## 2021-10-15 PROCEDURE — 97530 THERAPEUTIC ACTIVITIES: CPT

## 2021-10-15 PROCEDURE — 6370000000 HC RX 637 (ALT 250 FOR IP): Performed by: PHYSICAL MEDICINE & REHABILITATION

## 2021-10-15 PROCEDURE — 6360000002 HC RX W HCPCS: Performed by: PHYSICAL MEDICINE & REHABILITATION

## 2021-10-15 PROCEDURE — 99239 HOSP IP/OBS DSCHRG MGMT >30: CPT | Performed by: PHYSICAL MEDICINE & REHABILITATION

## 2021-10-15 RX ORDER — CARVEDILOL 25 MG/1
25 TABLET ORAL 2 TIMES DAILY
Qty: 60 TABLET | Refills: 0 | Status: SHIPPED | OUTPATIENT
Start: 2021-10-15

## 2021-10-15 RX ORDER — BUTALBITAL, ACETAMINOPHEN AND CAFFEINE 50; 325; 40 MG/1; MG/1; MG/1
1 TABLET ORAL 2 TIMES DAILY PRN
Qty: 10 TABLET | Refills: 0 | Status: SHIPPED | OUTPATIENT
Start: 2021-10-15 | End: 2021-10-20

## 2021-10-15 RX ORDER — CLONIDINE HYDROCHLORIDE 0.1 MG/1
0.1 TABLET ORAL 3 TIMES DAILY
Qty: 90 TABLET | Refills: 0 | Status: SHIPPED | OUTPATIENT
Start: 2021-10-15

## 2021-10-15 RX ORDER — TRAZODONE HYDROCHLORIDE 50 MG/1
50 TABLET ORAL NIGHTLY PRN
Qty: 14 TABLET | Refills: 0 | Status: SHIPPED | OUTPATIENT
Start: 2021-10-15

## 2021-10-15 RX ORDER — LIDOCAINE 4 G/G
1 PATCH TOPICAL DAILY
Qty: 30 PATCH | Refills: 0 | Status: SHIPPED | OUTPATIENT
Start: 2021-10-16

## 2021-10-15 RX ORDER — LOPERAMIDE HYDROCHLORIDE 2 MG/1
2 CAPSULE ORAL 4 TIMES DAILY PRN
Status: DISCONTINUED | OUTPATIENT
Start: 2021-10-15 | End: 2021-10-15 | Stop reason: HOSPADM

## 2021-10-15 RX ORDER — GABAPENTIN 400 MG/1
400 CAPSULE ORAL 3 TIMES DAILY
Qty: 90 CAPSULE | Refills: 0 | Status: SHIPPED | OUTPATIENT
Start: 2021-10-15 | End: 2021-11-14

## 2021-10-15 RX ORDER — LOPERAMIDE HYDROCHLORIDE 2 MG/1
4 CAPSULE ORAL ONCE
Status: COMPLETED | OUTPATIENT
Start: 2021-10-15 | End: 2021-10-15

## 2021-10-15 RX ADMIN — LOPERAMIDE HYDROCHLORIDE 4 MG: 2 CAPSULE ORAL at 11:52

## 2021-10-15 RX ADMIN — PANTOPRAZOLE SODIUM 20 MG: 20 TABLET, DELAYED RELEASE ORAL at 06:38

## 2021-10-15 RX ADMIN — CARVEDILOL 25 MG: 25 TABLET, FILM COATED ORAL at 08:39

## 2021-10-15 RX ADMIN — GABAPENTIN 400 MG: 400 CAPSULE ORAL at 13:59

## 2021-10-15 RX ADMIN — CLONIDINE HYDROCHLORIDE 0.1 MG: 0.1 TABLET ORAL at 08:38

## 2021-10-15 RX ADMIN — ACETAMINOPHEN 1000 MG: 500 TABLET ORAL at 13:58

## 2021-10-15 RX ADMIN — ACETAMINOPHEN 1000 MG: 500 TABLET ORAL at 06:37

## 2021-10-15 RX ADMIN — GABAPENTIN 400 MG: 400 CAPSULE ORAL at 08:38

## 2021-10-15 RX ADMIN — DULOXETINE HYDROCHLORIDE 60 MG: 60 CAPSULE, DELAYED RELEASE ORAL at 08:39

## 2021-10-15 RX ADMIN — CLONIDINE HYDROCHLORIDE 0.1 MG: 0.1 TABLET ORAL at 13:59

## 2021-10-15 RX ADMIN — HEPARIN SODIUM 7500 UNITS: 10000 INJECTION INTRAVENOUS; SUBCUTANEOUS at 06:37

## 2021-10-15 ASSESSMENT — PAIN SCALES - GENERAL
PAINLEVEL_OUTOF10: 0
PAINLEVEL_OUTOF10: 3
PAINLEVEL_OUTOF10: 3

## 2021-10-15 ASSESSMENT — PAIN DESCRIPTION - PROGRESSION: CLINICAL_PROGRESSION: GRADUALLY WORSENING

## 2021-10-15 NOTE — CARE COORDINATION
Per Team & Dr. Juancarlos Estevez. Plan dc 10/15/21. FT- N/A    DME:  No additional DME needed. Aftercare:  HEP    The Plan for Transition of Care is related to the following treatment goals: Home with HEP    The Patient and/or patient representative Margarette Deutsch was provided with a choice of provider and agrees with the discharge plan. [x] Yes [] No    Freedom of choice list was provided with basic dialogue that supports the patient's individualized plan of care/goals, treatment preferences and shares the quality data associated with the providers.  [x] Yes [] No    RIMMA Bain  10/15/2021

## 2021-10-15 NOTE — PROGRESS NOTES
Physical Therapy  Treatment Note    Evaluating Therapist: Daisybritton Leonardo PT, [id number]    ROOM: [address] [place] [address]  DIAGNOSIS: SAH  PRECAUTIONS: Falls, SBP < 140  HPI:  47 yo female with history of HTN, HLD, DM, GERD, hx R TKR 7/19/2021 & ESEQUIEL, Bipolar presented to the hospital with photophobia, phonophobia, headache, and neck stiffness. She was found to have a nontraumatic subarachnoid hemorrhage. She had some seizure-like activity in the ED and was medicated with benzodiazepines. Social:  Pt lives alone in a 2 floor plan 2 steps and no rail. Bed and bath on 1st or  2nd floor. 4 stairs to 2nd floor with bilateral rails. States she has \"Plenty\" of friends and family available to assist PRN. Prior to admission: Pt used Rollator on first floor, no device on 2nd floor and SPC in community. Independent PTA     Initial Evaluation  Date: 10/12/21 AM     Short Term Goals Long Term Goals    Was pt agreeable to Eval/treatment? Yes yes     Does pt have pain?  10/10 low back and headache Abdominal discomfort due to IBS     Bed Mobility  Rolling: SBA  Supine to sit: SBA  Sit to supine: MaxA  Scooting: SBA  Independent   SBA Independent     Transfers Sit to stand: Ezra  Stand to sit: Ezra  Stand pivot: Ezra [employer]   Modified Independent  Jamar [employer] SBA   Modified Independent    AAD   Ambulation    50 feet with Ezra with jamar [employer]    10mWT: .73 m/s  6mWT: 50 m 300 Modified Independent  10mWT:  1.07 m/s 300 feet with SBA with AAD     300 feet with Modified Independent   with AAD    10mWT: 1.0 m/s  6mWT: 200m   Walking 10 feet on uneven surface 10 feet with Ezra with [employer] 10 feet with Modified Independent   with Berneda Nade[employer] WW 10 feet with SBA with AAD 10 feet with Modified Independent   with AAD   Wheel Chair Mobility 150 Independent   NT     Car Transfers NT  simulated jeep transfer with mat table  - Modified Independent   SBA Modified Independent     Stair negotiation: ascended and descended  8 steps with B rail with Ezra 12 steps single rail with Modified Independent     12 steps with single rail with SBA 12 steps with single rail with Modified Independent     Curb Step:   ascended and descended 4 inch step with Ezra and [employer] 4 \" step Modified Independent  Jamar [employer] 4 inch step with SBA and [employer] 4 inch step with Modified Independent   and Jamar [employer]   Picking up object off the floor NT Picks up shoe Modified Independent   Will  shoe with SBA assist Will  shoe with  Independent   assist   BLE ROM WFL      BLE Strength 2/5 B hips (pain in back)  4/5 hip extension, knees, ankle      Balance  Ezra [employer] WW standing     BBS: 43/56     Modified Independent        BBS: >45/56     Date Family Teach Completed       Is additional Family Teaching Needed? Y or N Y N     Hindering Progress Obesity, cognition, Weakness, Pain Obesity,  Weakness, Pain     PT recommended ELOS 10 days 10/15     Team's Discharge Plan       Therapist at Team Meeting         A&O x 4. Reports her cognition and speech has improved. States it at base      ASSESSMENT  Pt displays functional ability as noted in the objective portion of this evaluation. Comments: Pt declined to perform 6mWT. Pt with improvement in activity tolerance. Pt ambulates with good balance and demonstrates good safety using [facility]. Mild endurance deficits remain, however pt states she is at her functional baseline. Plan to d/c this afternoon. Patient education  Pt educated on role of PT. Patient response to education:   Pt verbalized understanding Pt demonstrated skill Pt requires further education in this area   x x x     Rehab potential is Good for reaching above PT goals. Pts/ family goals   1. Return to OF     Patient and or family understand(s) diagnosis, prognosis, and plan of care. yes    PLAN  PT care will be provided in accordance with the objectives noted above. Whenever appropriate, clear delegation orders will be provided for nursing staff.   Exercises and functional mobility practice will be used as well as appropriate assistive devices or modalities to obtain goals. Patient and family education will also be administered as needed. Frequency of treatments will be 2x/day M-F and 1x/day Sat or Sun x 10 days.     Time in: 1045  Time out: [address] [facility], PT, DPT  [id number]

## 2021-10-15 NOTE — PROGRESS NOTES
Occupational Therapy  OCCUPATIONAL THERAPY DAILY NOTE/DISCHARGE SUMMARY    Date:10/15/2021  Patient Name: Heidy Reilly  MRN: [id number]  : 1975  Room: [address] [place] [address]-     Referring Practitioner: Samia Skaggs MD  Diagnosis: SAH- CVA- R occipital  Pertinent Past Medical History: HTN, HLD, DM, GERD, hx R TKR 2021 & ESEQUIEL  Restrictions: falls    Functional Assessment:   Date Status AE  Comments   Feeding 10/14/21 independent     Grooming 10/15/21 independent ww Standing for hand hygiene following toilet needs using bariatric w. Walker along with brushing and putting up hair. Oral Care 10/14/21 independent     Bathing 10/14/21 Min A Long handled sponge    UB Dressing 10/14/21 s/u     LB Dressing 10/14/21 Sup     Footwear 10/15/21 CGA Shoe horn To gallito sneakers while seated on EOB. Toileting 10/15/21 Mod I Toilet tongs  Bariatric w. Walker   Grab bar Pt completed toilet hygiene/clothing mgmt using grab bar and w. Edmar Else for balance. Homemaking 10/15/21  Sup- simple laundry   Pt folded washcloths then stacked into basket performed while in bed due to having pain issues due to frequent bowel movements due to IBS. Pt started kitchen mobility at w. Walker level at countertop then suddenly needed to use bathroom ASAP. Pt had to utilize hallway toilet to move bowels. Functional Transfers / Balance:   Date Status DME  Comments   Sit Balance 10/15/21 independent  Sitting balance on EOB and on sofa to increase endurance and strength for functional tasks & ADL's   Stand Balance 10/15/21 Sup No device  Bariatric w. Walker  Standing balance completed during grooming, fxl mobility and transfers. [] Tub  [x] Shower   Transfer 10/14/21 SBA  bench 10/15/21 - Pt stated \" I have ext tub bench transfer at home\"   Commode   Transfer 10/15/21 Mod I Std. Toilet  Grab bar    Functional   Mobility 10/15/21 Mod I Bariatric w. Walker  Pt used bariatric w.  Edmar Else in room then over to apt then OT kitchen up/down hallway to increase overall endurance, strength and balance. Other: supine<>sit    sofa 10/15/21    10/15/21 Independent    Mod I  Supine <>EOB transfers using bed rail for safety. Ww<>sofa transfers in apt. Functional Exercises / Activity:      Sensory / Neuromuscular Re-Education:      Cognitive Skills:   Status Comments   Problem   Solving Good- Demo'd during functional transfers, fxl mobility and hmkg skills. Memory Good- \"   Sequencing good  \"   Safety Good- \"     Visual Perception:    Education:  Pt educated with safety during fxl mobility using w. Walker along with on/off EOB and sofa transfers to increase awareness. [] Family teach completed on:    Pain Level: Buttocks pain due to bowel issues and nurse made aware. Additional Notes:       Patient has made good  progress during treatment sessions toward set goals. Therapy emphasis to obtain goals:ADL retraining, transfers training, functional mobility, homemaking, endurance/balance retraining, therex, FMC/dexterity & pt/family education       [] Continue with current OT Plan of care. [x] Prepare for Discharge    Goals  Long term goals  Time Frame for Long term goals : 10 days to address above problem areas  Long term goal 1: Pt demo independent to eat all meals  Long term goal 2: Pt demo Mod I grooming standing @ sink level  Long term goal 3: Pt demo Mod I bathing @ shower level both seated & standing  Long term goal 4: Pt demo I UE & Mod I LE dress using AE to don underwear, pants, socks & shoes  Long term goal 5: Pt demo Mod I commode trf with appropriate DME to ensure pt safety.  Pt demo Mod I toileting & demo G safety & insight  Long term goals 6: Pt demo Mod I  tub trf using appropriate DME to ensure pt safety  Long term goal 7: Pt demo Mod I light homemamking task  Long term goal 8: Pt demo G endurance for a 20-30 minute functional activity  Long term goal 9: Pt demo improved [facility] & dexterity to improve pt ability to complete ADLs & IADLs f    The Patient has received education on:  [x]Transfer Safety [x]Compensatory tech for ADLs [x]AE training [x]DME training [x]Energy Conservation [x]Home / Kitchen Safety  [x]Fall Prevention  []Other:    Family training was completed: Patient requested no FT session. Recommendations: Discharge home with HEP      Recommended DME:  Patient has A.E at home per consult with SW and patient. Post Discharge Care:   [x]Home Independently  []Home with 24hr Care / Supervision []Home with Partial Supervision []Home with Home Health OT []Home with Out Pt OT []Other: ___   Comments:         Time in Time out Tx Time Breakdown  Variance:   First Session  0915am 1000 [x] Individual Tx- 45  [] Concurrent Tx -  [] Co-Tx -   [] Group Tx -   [] Time Missed -     Second Session   [] Individual Tx-   [] Concurrent Tx -   [] Co-Tx -   [] Group Tx -   [] Time Missed -             Third Session    [] Individual Tx-   [] Concurrent Tx -  [] Co-Tx -   [] Group Tx -   [] Time Missed -         Total Tx Time:   [address] HINDS/L [id number]  I have read the above note and agree with the documentation.   Luis Nunez OTR/L [id number]

## 2021-10-15 NOTE — DISCHARGE SUMMARY
uneven surface 10 feet with Ezra with Debborah Cammy [employer] 10 feet with Modified Independent   with Debborah Cammy [employer] Chair Mobility 150 Independent    NT   Car Transfers NT  simulated jeep transfer with mat table  - Modified Independent      Stair negotiation: ascended and descended  8 steps with B rail with Ezra 12 steps single rail with Modified Independent         Curb Step:   ascended and descended 4 inch step with Ezra and Debborah Cammy [employer] 4 \" step Modified Independent  [employer]   Picking up object off the floor NT Picks up shoe Modified [address] [employer] standing      BBS: 43/56       Modified Independent            Functional Assessment:      Date   Status   AE    Comments     Feeding   10/14/21   independent             Grooming   10/14/21   independent       standing for hand hygiene           Oral Care   10/14/21   independent       Standing to brush her teeth     Bathing   10/14/21   Min A   Long handled sponge   Assist to untangle her hair due to matted hair from being hospitalized     UB Dressing   10/14/21   s/u             LB Dressing   10/14/21   Sup       To don shorts     Footwear   10/14/21   Min A    Shoe horn   To don      Toileting   10/14/21   Mod I   Toilet tongs   Pt wanted total privacy while she was in the bathroom and did not want therapist in there. Per pt report she used the toileting aide for her hygiene. She required no assist with pants management     Homemaking   10/12/21    TBA                   Discharge Physical Examination  General appearance: Alert, NAD  Eyes: conjunctivae/corneas clear. PERRL, EOM's intact.   Lungs: clear to auscultation bilaterally  Heart: regular rate and rhythm, S1, S2 normal  Abdomen: soft, non-tender, normal bowel sounds   Musculoskeletal: Range of motion normal and no gross abnormalities.   Skin:  No rashes or lesions  Neurologic: Alert, oriented. Strength 3-4-/5 bilateral HF; 4/5 bilateral KE, DF, PF  5/5 throughout bilateral upper extremities.  Hospital Course   Functional and mobility deficits secondary to [address]:  The patient completed an intensive inpatient rehabilitation program including PT, OT, SLP, and achieved significant improvement in function as documented above. Recommended continued therapies are documented below.      -Nontraumatic SAH: Evaluated by NSGY, no acute surgical intervention. [facility]. Exam remained stable during rehab. Completed Acute Rehab program with functional progress as above.   -Pain control, chronic back pain, headaches: Oxycodone PRN (no longer taking), flexeril PRN, Tylenol, Lidoderm, gabapentin, prn Fioricet (short-term use), Cymbalta while inpatient. Discharge medications per prescription. -UTI: Completed course of Bactrim --resolved   -HTN: Continued Coreg, clonidine. Monitor BP--controlled   -Morbid obesity: lifestyle modifications recommended  -GERD: Continued protonix while inpatient.      ==================================================================  Discharge Medications     Medication List      START taking these medications    butalbital-acetaminophen-caffeine -40 MG per tablet  Commonly known as: FIORICET, ESGIC  Take 1 tablet by mouth 2 times daily as needed for Headaches     carvedilol 25 MG tablet  Commonly known as: COREG  Take 1 tablet by mouth 2 times daily     cloNIDine 0.1 MG tablet  Commonly known as: CATAPRES  Take 1 tablet by mouth 3 times daily     gabapentin 400 MG capsule  Commonly known as: NEURONTIN  Take 1 capsule by mouth 3 times daily for 30 days. Replaces: gabapentin 600 MG tablet     hydrocortisone 2.5 % cream  Apply topically 2 times daily.      lidocaine 4 % external patch  Place 1 patch onto the skin daily  Start taking on: October 16, 2021     traZODone 50 MG tablet  Commonly known as: DESYREL  Take 1 tablet by mouth nightly as needed for Sleep        CONTINUE taking these medications    cyclobenzaprine 10 MG tablet  Commonly known as: FLEXERIL     diphenoxylate-atropine 2.5-0.025 MG per tablet  Commonly known as: LOMOTIL     DULoxetine 60 MG extended release capsule  Commonly known as: CYMBALTA     omeprazole 40 MG delayed release capsule  Commonly known as: PRILOSEC        STOP taking these medications    gabapentin 600 MG tablet  Commonly known as: NEURONTIN  Replaced by: gabapentin 400 MG capsule     oxyCODONE-acetaminophen 5-325 MG per tablet  Commonly known as: PERCOCET           Where to Get Your Medications      These medications were sent to [place] [address] #[id number] [facility], [address] [facility] [phone] [address] [address], [facility] [address]    Phone: [phone]   · butalbital-acetaminophen-caffeine -40 MG per tablet  · carvedilol 25 MG tablet  · cloNIDine 0.1 MG tablet  · gabapentin 400 MG capsule  · hydrocortisone 2.5 % cream  · lidocaine 4 % external patch  · traZODone 50 MG tablet         Allergies  No known allergies      Recommended Therapies at Discharge: Home exercise program, instructions provided by PT and OT      Follow-Up  -Primary care physician  -Alie Davis at [facility] as instructed by them    Information provided to the patient and appropriate family members regarding discharge medications and follow up     More than 30 minutes was spent in preparation of this patient's discharge including, but not limited to, examination, preparation of documents, prescription preparation, counseling and coordination.       Electronically signed by Brendon Rajput MD on 10/15/2021 at 1:11 PM

## 2021-10-15 NOTE — PROGRESS NOTES
Physical Therapy  Discharge Summary  Evaluating Therapist: Alex Shore PT, [id number]    ROOM: 1371/6476-O  DIAGNOSIS: SAH  PRECAUTIONS: Falls, SBP < 140  HPI:  47 yo female with history of HTN, HLD, DM, GERD, hx R TKR 7/19/2021 & ESEQUIEL, Bipolar presented to the hospital with photophobia, phonophobia, headache, and neck stiffness. She was found to have a nontraumatic subarachnoid hemorrhage. She had some seizure-like activity in the ED and was medicated with benzodiazepines. Social:  Pt lives alone in a 2 floor plan 2 steps and no rail. Bed and bath on 1st or  2nd floor. 4 stairs to 2nd floor with bilateral rails. States she has \"Plenty\" of friends and family available to assist PRN. Prior to admission: Pt used Rollator on first floor, no device on 2nd floor and SPC in community. Independent PTA     Initial Evaluation  Date: 10/12/21 Discharge date 10/15/21  Short Term Goals Long Term Goals    Does pt have pain?  10/10 low back and headache Abdominal discomfort due to IBS     Bed Mobility  Rolling: SBA  Supine to sit: SBA  Sit to supine: MaxA  Scooting: SBA  Independent   SBA Independent     Transfers Sit to stand: Ezra  Stand to sit: Ezra  Stand pivot: Ezra [employer]   Modified Independent  Jamar [employer] SBA   Modified Independent    AAD   Ambulation    50 feet with Ezra with jamar [employer]    10mWT: .73 m/s  6mWT: 50 m 300 Modified Independent  10mWT:  1.07 m/s 300 feet with SBA with AAD     300 feet with Modified Independent   with AAD    10mWT: 1.0 m/s  6mWT: 200m   Walking 10 feet on uneven surface 10 feet with Ezra with [employer] 10 feet with Modified Independent   with Drenda S[employer] 10 feet with SBA with AAD 10 feet with Modified Independent   with AAD   Wheel Chair Mobility 150 Independent   NT     Car Transfers NT  simulated jeep transfer with mat table  - Modified Independent   SBA Modified Independent     Stair negotiation: ascended and descended  8 steps with B rail with Ezra 12 steps single rail with Modified Independent     12 steps with single rail with SBA 12 steps with single rail with Modified Independent     Curb Step:   ascended and descended 4 inch step with Ezra and [employer] 4 \" step Modified Independent  Jamar [employer] 4 inch step with SBA and [employer] 4 inch step with Modified Independent   and Jamar [employer]   Picking up object off the floor NT Picks up shoe Modified Independent   Will  shoe with SBA assist Will  shoe with  Independent   assist   BLE ROM WFL      BLE Strength 2/5 B hips (pain in back)  4/5 hip extension, knees, ankle      Balance  Ezra [employer] standing     BBS: 43/56     Modified Independent        BBS: >45/56     Date Family Teach Completed       Is additional Family Teaching Needed? Y or N Y N     Hindering Progress Obesity, cognition, Weakness, Pain Obesity,  Weakness, Pain     PT recommended ELOS 10 days 10/15     Team's Discharge Plan       Therapist at Team Meeting         Comments: Pt has made good progress throughout acute rehabilitation stay. Pt has progressed to meet all long term goals. Pt states she has assistance from friends family in area if needed. Pt demonstrates good safety mobilizing with use of bariatric WW. Family teach not needed due to patient high level functioning. Pt reports cognition and strength have returned to prior level of function.        Usha Moreira, PT, DPT  [id number]

## 2021-10-15 NOTE — FLOWSHEET NOTE
Discharge instructions reviewed with patient she has no Questions at this time. Pt is aware that medications were sent to her personal pharmacy.

## 2021-10-15 NOTE — PROGRESS NOTES
Speech Language Pathology  ACUTE REHABILITATION--DAILY PROGRESS NOTE/DISCHARGE NOTE          PATIENT NAME:  Leatha Funes  (female)                MRN:  [id number]                       :  1975  (48 y.o.)  STATUS:  Inpatient: Room 5514/5514-B                     TODAY'S DATE:  10/12/2021  REFERRING PROVIDER:    Allen Zepeda Md      SPECIFIC PROVIDER ORDER: SLP eval and treat  Date of order:  10/11/21  REASON FOR REFERRAL: Evaluation s/p SAH  EVALUATING THERAPIST: BOBBI Pandey     ADMITTING DIAGNOSIS: [facility] [I60.9]     VISIT DIAGNOSIS:          SPEECH THERAPY  PLAN OF CARE   The speech therapy  POC is established based on physician order, speech pathology diagnosis and results of clinical assessment      SPEECH PATHOLOGY DIAGNOSIS:    Mild Cognitive Impairment     Speech Pathology intervention is recommended 3-6 times per week for LOS or when goals are met with emphasis on the following:       Conditions Requiring Skilled Therapeutic Intervention for speech, language and/or cognition     Cognitive linguistic impairment     Specific Speech Therapy Interventions to Include: Therapeutic tasks for Cognition     Specific instructions for next treatment:                 To initiate POC  To initiate memory tasks  To initiate thought organization tasks     SHORT/LONG TERM GOALS  Pt will improve attention, immediate, short term, recent memory during structured and unstructured tasks with 95% accuracy   Pt will improve problem solving/thought organization during structured and unstructured tasks with 95% accuracy      Patient stated goals: Agreed with above,      Rehabilitation Potential/Prognosis: excellent       FIMS    Swallowing                          Current Status             7--Modified Independent                       Short Term Goal         6--Modified Independent                       Long Term Goal          7--Independent                Receptive Current Status             5--Supervision /Modified Independent              Short Term Goal         6--Modified Independent                       Long Term Goal          7--Independent                Expressive                          Current Status             5--Supervision / Modified Independent             Short Term Goal         6--Modified Independent                       Long Term Goal          7--Independent                Social Interaction                          Current Status             6--Modified Independent                       Short Term Goal         6--Modified Independent                       Long Term Goal          7--Independent                                                               Problem Solving                          Current Status             4--Minimal Assistance               Short Term Goal         5--Supervision               Long Term Goal          6--Modified Independent                          Memory                          Current Status             4--Minimal Assistance               Short Term Goal         5--Supervision               Long Term Goal          6- Modified Independent                     SWALLOWING:      Diet:  Regular consistency solids with thin liquids (IDDSI level 0)     Requires Supervision during meals d/t distractability     LANGUAGE:    Good expression and understanding of wants/needs/health care circumstances. Good topic maintenance with minimal redirection to topic/task. COGNITION:    Good outcome with written and verbal reasoning tasks related to medication maintenance of identifying determining factors when scheduling medications and making important decisions min prompts/cues to independently. Good outcome when presented with ordering of 14 holidays. SLP presented patient with 14 written holidays. Patient succssfully ordered 14/14 holidays independently.   Good outcome when remembering card number and suit during SLP card trick. Patient was able to remember card information throughout the 5 minute duration of the task.      Patient completed a verbal problem solving resolution related to home discharge and medications/prescriptions. Patient completed the task with good outcome and min to none v/c. Safety:  Fair+    SPEECH:     WFL, No Dysarthria, No Dysfluencies    SP recommended after discharge:  None  Supervision recommended at discharge: Defer to PT/OT      EDUCATION: Speech Pathologist (SLP) completed education with the patient and/or family regarding identified cognitive linguistic deficits and subsequent need for speech pathology intervention. Discussed deficit areas to be targeted by formal intervention and established short/long term goals. Reviewed compensatory strategies to improve functional outcome (as appropriate). Encouraged patient and/or family to engage SLP in structured Q&A session relative to identified deficit areas. Patient and/or family indicated understanding of all information provided via satisfactory verbal response. Babita Kovacs.  Viv SANTIAGO, [facility]-SLP  [place]. [id number]  10/15/2021    Minute Tracking:    Individual therapy:    0 minutes  Concurrent therapy:    0 minutes  Group therapy:     45 minutes  Co-treatment therapy:    0 minutes    Total minutes for 10/15/2021: 45 minutes

## 2024-05-11 ENCOUNTER — APPOINTMENT (OUTPATIENT)
Dept: ULTRASOUND IMAGING | Age: 49
DRG: 418 | End: 2024-05-11
Payer: MEDICAID

## 2024-05-11 ENCOUNTER — APPOINTMENT (OUTPATIENT)
Dept: CT IMAGING | Age: 49
DRG: 418 | End: 2024-05-11
Payer: MEDICAID

## 2024-05-11 ENCOUNTER — HOSPITAL ENCOUNTER (INPATIENT)
Age: 49
LOS: 1 days | Discharge: HOME | DRG: 418 | End: 2024-05-13
Attending: STUDENT IN AN ORGANIZED HEALTH CARE EDUCATION/TRAINING PROGRAM | Admitting: STUDENT IN AN ORGANIZED HEALTH CARE EDUCATION/TRAINING PROGRAM
Payer: MEDICAID

## 2024-05-11 DIAGNOSIS — K81.9 CHOLECYSTITIS: ICD-10-CM

## 2024-05-11 DIAGNOSIS — K81.0 ACUTE CHOLECYSTITIS: Primary | ICD-10-CM

## 2024-05-11 LAB
ALBUMIN SERPL-MCNC: 4.4 G/DL (ref 3.5–5.2)
ALP SERPL-CCNC: 79 U/L (ref 35–104)
ALT SERPL-CCNC: 24 U/L (ref 0–32)
ANION GAP SERPL CALCULATED.3IONS-SCNC: 12 MMOL/L (ref 7–16)
AST SERPL-CCNC: 23 U/L (ref 0–31)
BASOPHILS # BLD: 0.06 K/UL (ref 0–0.2)
BASOPHILS NFR BLD: 1 % (ref 0–2)
BILIRUB DIRECT SERPL-MCNC: 0.3 MG/DL (ref 0–0.3)
BILIRUB INDIRECT SERPL-MCNC: 0.8 MG/DL (ref 0–1)
BILIRUB SERPL-MCNC: 1.1 MG/DL (ref 0–1.2)
BUN SERPL-MCNC: 9 MG/DL (ref 6–20)
CALCIUM SERPL-MCNC: 9.2 MG/DL (ref 8.6–10.2)
CHLORIDE SERPL-SCNC: 100 MMOL/L (ref 98–107)
CO2 SERPL-SCNC: 26 MMOL/L (ref 22–29)
CREAT SERPL-MCNC: 0.8 MG/DL (ref 0.5–1)
EOSINOPHIL # BLD: 0.22 K/UL (ref 0.05–0.5)
EOSINOPHILS RELATIVE PERCENT: 2 % (ref 0–6)
ERYTHROCYTE [DISTWIDTH] IN BLOOD BY AUTOMATED COUNT: 12.4 % (ref 11.5–15)
GFR, ESTIMATED: 88 ML/MIN/1.73M2
GLUCOSE SERPL-MCNC: 109 MG/DL (ref 74–99)
HCT VFR BLD AUTO: 43.3 % (ref 34–48)
HGB BLD-MCNC: 15 G/DL (ref 11.5–15.5)
IMM GRANULOCYTES # BLD AUTO: 0.03 K/UL (ref 0–0.58)
IMM GRANULOCYTES NFR BLD: 0 % (ref 0–5)
LACTATE BLDV-SCNC: 1.4 MMOL/L (ref 0.5–2.2)
LIPASE SERPL-CCNC: 47 U/L (ref 13–60)
LYMPHOCYTES NFR BLD: 1.33 K/UL (ref 1.5–4)
LYMPHOCYTES RELATIVE PERCENT: 12 % (ref 20–42)
MCH RBC QN AUTO: 32.7 PG (ref 26–35)
MCHC RBC AUTO-ENTMCNC: 34.6 G/DL (ref 32–34.5)
MCV RBC AUTO: 94.3 FL (ref 80–99.9)
MONOCYTES NFR BLD: 0.57 K/UL (ref 0.1–0.95)
MONOCYTES NFR BLD: 5 % (ref 2–12)
NEUTROPHILS NFR BLD: 80 % (ref 43–80)
NEUTS SEG NFR BLD: 8.92 K/UL (ref 1.8–7.3)
PLATELET # BLD AUTO: 215 K/UL (ref 130–450)
PMV BLD AUTO: 10.6 FL (ref 7–12)
POTASSIUM SERPL-SCNC: 3.7 MMOL/L (ref 3.5–5)
PROT SERPL-MCNC: 7.2 G/DL (ref 6.4–8.3)
RBC # BLD AUTO: 4.59 M/UL (ref 3.5–5.5)
SODIUM SERPL-SCNC: 138 MMOL/L (ref 132–146)
WBC OTHER # BLD: 11.1 K/UL (ref 4.5–11.5)

## 2024-05-11 PROCEDURE — 76705 ECHO EXAM OF ABDOMEN: CPT

## 2024-05-11 PROCEDURE — 96376 TX/PRO/DX INJ SAME DRUG ADON: CPT

## 2024-05-11 PROCEDURE — 2580000003 HC RX 258: Performed by: PHYSICIAN ASSISTANT

## 2024-05-11 PROCEDURE — 96375 TX/PRO/DX INJ NEW DRUG ADDON: CPT

## 2024-05-11 PROCEDURE — 83690 ASSAY OF LIPASE: CPT

## 2024-05-11 PROCEDURE — 99285 EMERGENCY DEPT VISIT HI MDM: CPT

## 2024-05-11 PROCEDURE — 6360000004 HC RX CONTRAST MEDICATION: Performed by: RADIOLOGY

## 2024-05-11 PROCEDURE — 6360000002 HC RX W HCPCS: Performed by: PHYSICIAN ASSISTANT

## 2024-05-11 PROCEDURE — 85025 COMPLETE CBC W/AUTO DIFF WBC: CPT

## 2024-05-11 PROCEDURE — 6360000002 HC RX W HCPCS: Performed by: STUDENT IN AN ORGANIZED HEALTH CARE EDUCATION/TRAINING PROGRAM

## 2024-05-11 PROCEDURE — 74177 CT ABD & PELVIS W/CONTRAST: CPT

## 2024-05-11 PROCEDURE — 2580000003 HC RX 258: Performed by: STUDENT IN AN ORGANIZED HEALTH CARE EDUCATION/TRAINING PROGRAM

## 2024-05-11 PROCEDURE — 96365 THER/PROPH/DIAG IV INF INIT: CPT

## 2024-05-11 PROCEDURE — 80053 COMPREHEN METABOLIC PANEL: CPT

## 2024-05-11 PROCEDURE — 82248 BILIRUBIN DIRECT: CPT

## 2024-05-11 PROCEDURE — 83605 ASSAY OF LACTIC ACID: CPT

## 2024-05-11 RX ORDER — MORPHINE SULFATE 4 MG/ML
4 INJECTION, SOLUTION INTRAMUSCULAR; INTRAVENOUS ONCE
Status: COMPLETED | OUTPATIENT
Start: 2024-05-11 | End: 2024-05-11

## 2024-05-11 RX ORDER — ONDANSETRON 2 MG/ML
4 INJECTION INTRAMUSCULAR; INTRAVENOUS EVERY 6 HOURS PRN
Status: DISCONTINUED | OUTPATIENT
Start: 2024-05-11 | End: 2024-05-12

## 2024-05-11 RX ORDER — METRONIDAZOLE 500 MG/100ML
500 INJECTION, SOLUTION INTRAVENOUS ONCE
Status: COMPLETED | OUTPATIENT
Start: 2024-05-11 | End: 2024-05-12

## 2024-05-11 RX ORDER — SODIUM CHLORIDE, SODIUM LACTATE, POTASSIUM CHLORIDE, AND CALCIUM CHLORIDE .6; .31; .03; .02 G/100ML; G/100ML; G/100ML; G/100ML
500 INJECTION, SOLUTION INTRAVENOUS ONCE
Status: COMPLETED | OUTPATIENT
Start: 2024-05-11 | End: 2024-05-11

## 2024-05-11 RX ADMIN — METRONIDAZOLE 500 MG: 500 INJECTION, SOLUTION INTRAVENOUS at 23:55

## 2024-05-11 RX ADMIN — ONDANSETRON 4 MG: 2 INJECTION INTRAMUSCULAR; INTRAVENOUS at 20:10

## 2024-05-11 RX ADMIN — SODIUM CHLORIDE, POTASSIUM CHLORIDE, SODIUM LACTATE AND CALCIUM CHLORIDE 500 ML: 600; 310; 30; 20 INJECTION, SOLUTION INTRAVENOUS at 20:09

## 2024-05-11 RX ADMIN — IOPAMIDOL 75 ML: 755 INJECTION, SOLUTION INTRAVENOUS at 21:39

## 2024-05-11 RX ADMIN — MORPHINE SULFATE 4 MG: 4 INJECTION, SOLUTION INTRAMUSCULAR; INTRAVENOUS at 22:47

## 2024-05-11 RX ADMIN — MORPHINE SULFATE 4 MG: 4 INJECTION, SOLUTION INTRAMUSCULAR; INTRAVENOUS at 20:10

## 2024-05-11 RX ADMIN — CEFTRIAXONE SODIUM 2000 MG: 2 INJECTION, POWDER, FOR SOLUTION INTRAMUSCULAR; INTRAVENOUS at 23:54

## 2024-05-11 ASSESSMENT — PAIN DESCRIPTION - LOCATION: LOCATION: ABDOMEN

## 2024-05-11 ASSESSMENT — PAIN SCALES - GENERAL
PAINLEVEL_OUTOF10: 10
PAINLEVEL_OUTOF10: 9

## 2024-05-11 ASSESSMENT — PAIN - FUNCTIONAL ASSESSMENT: PAIN_FUNCTIONAL_ASSESSMENT: 0-10

## 2024-05-11 ASSESSMENT — PAIN DESCRIPTION - ORIENTATION: ORIENTATION: RIGHT;LOWER;MID

## 2024-05-11 ASSESSMENT — LIFESTYLE VARIABLES: HOW OFTEN DO YOU HAVE A DRINK CONTAINING ALCOHOL: NEVER

## 2024-05-12 ENCOUNTER — ANESTHESIA (OUTPATIENT)
Dept: OPERATING ROOM | Age: 49
DRG: 418 | End: 2024-05-12
Payer: MEDICAID

## 2024-05-12 ENCOUNTER — ANESTHESIA EVENT (OUTPATIENT)
Dept: OPERATING ROOM | Age: 49
DRG: 418 | End: 2024-05-12
Payer: MEDICAID

## 2024-05-12 ENCOUNTER — APPOINTMENT (OUTPATIENT)
Dept: GENERAL RADIOLOGY | Age: 49
DRG: 418 | End: 2024-05-12
Payer: MEDICAID

## 2024-05-12 PROBLEM — K81.0 ACUTE CHOLECYSTITIS: Status: ACTIVE | Noted: 2024-05-12

## 2024-05-12 LAB
BACTERIA URNS QL MICRO: ABNORMAL
BILIRUB UR QL STRIP: ABNORMAL
CLARITY UR: CLEAR
COLOR UR: ABNORMAL
GLUCOSE UR STRIP-MCNC: NEGATIVE MG/DL
HGB UR QL STRIP.AUTO: NEGATIVE
KETONES UR STRIP-MCNC: 15 MG/DL
LEUKOCYTE ESTERASE UR QL STRIP: ABNORMAL
NITRITE UR QL STRIP: POSITIVE
PH UR STRIP: 6.5 [PH] (ref 5–9)
PROT UR STRIP-MCNC: NEGATIVE MG/DL
RBC #/AREA URNS HPF: ABNORMAL /HPF
SP GR UR STRIP: 1.02 (ref 1–1.03)
UROBILINOGEN UR STRIP-ACNC: 0.2 EU/DL (ref 0–1)
WBC #/AREA URNS HPF: ABNORMAL /HPF

## 2024-05-12 PROCEDURE — 81001 URINALYSIS AUTO W/SCOPE: CPT

## 2024-05-12 PROCEDURE — 6360000002 HC RX W HCPCS

## 2024-05-12 PROCEDURE — A4216 STERILE WATER/SALINE, 10 ML: HCPCS | Performed by: STUDENT IN AN ORGANIZED HEALTH CARE EDUCATION/TRAINING PROGRAM

## 2024-05-12 PROCEDURE — 0FN44ZZ RELEASE GALLBLADDER, PERCUTANEOUS ENDOSCOPIC APPROACH: ICD-10-PCS | Performed by: STUDENT IN AN ORGANIZED HEALTH CARE EDUCATION/TRAINING PROGRAM

## 2024-05-12 PROCEDURE — 6360000002 HC RX W HCPCS: Performed by: ANESTHESIOLOGY

## 2024-05-12 PROCEDURE — 6360000002 HC RX W HCPCS: Performed by: PHYSICIAN ASSISTANT

## 2024-05-12 PROCEDURE — 87449 NOS EACH ORGANISM AG IA: CPT

## 2024-05-12 PROCEDURE — 2580000003 HC RX 258: Performed by: STUDENT IN AN ORGANIZED HEALTH CARE EDUCATION/TRAINING PROGRAM

## 2024-05-12 PROCEDURE — 2500000003 HC RX 250 WO HCPCS

## 2024-05-12 PROCEDURE — 3600000013 HC SURGERY LEVEL 3 ADDTL 15MIN: Performed by: STUDENT IN AN ORGANIZED HEALTH CARE EDUCATION/TRAINING PROGRAM

## 2024-05-12 PROCEDURE — 88304 TISSUE EXAM BY PATHOLOGIST: CPT

## 2024-05-12 PROCEDURE — 6360000002 HC RX W HCPCS: Performed by: STUDENT IN AN ORGANIZED HEALTH CARE EDUCATION/TRAINING PROGRAM

## 2024-05-12 PROCEDURE — 0FT44ZZ RESECTION OF GALLBLADDER, PERCUTANEOUS ENDOSCOPIC APPROACH: ICD-10-PCS | Performed by: STUDENT IN AN ORGANIZED HEALTH CARE EDUCATION/TRAINING PROGRAM

## 2024-05-12 PROCEDURE — 3600000003 HC SURGERY LEVEL 3 BASE: Performed by: STUDENT IN AN ORGANIZED HEALTH CARE EDUCATION/TRAINING PROGRAM

## 2024-05-12 PROCEDURE — 7100000001 HC PACU RECOVERY - ADDTL 15 MIN: Performed by: STUDENT IN AN ORGANIZED HEALTH CARE EDUCATION/TRAINING PROGRAM

## 2024-05-12 PROCEDURE — 3700000000 HC ANESTHESIA ATTENDED CARE: Performed by: STUDENT IN AN ORGANIZED HEALTH CARE EDUCATION/TRAINING PROGRAM

## 2024-05-12 PROCEDURE — 1200000000 HC SEMI PRIVATE

## 2024-05-12 PROCEDURE — 87324 CLOSTRIDIUM AG IA: CPT

## 2024-05-12 PROCEDURE — 87081 CULTURE SCREEN ONLY: CPT

## 2024-05-12 PROCEDURE — 2500000003 HC RX 250 WO HCPCS: Performed by: STUDENT IN AN ORGANIZED HEALTH CARE EDUCATION/TRAINING PROGRAM

## 2024-05-12 PROCEDURE — 3700000001 HC ADD 15 MINUTES (ANESTHESIA): Performed by: STUDENT IN AN ORGANIZED HEALTH CARE EDUCATION/TRAINING PROGRAM

## 2024-05-12 PROCEDURE — 6370000000 HC RX 637 (ALT 250 FOR IP): Performed by: STUDENT IN AN ORGANIZED HEALTH CARE EDUCATION/TRAINING PROGRAM

## 2024-05-12 PROCEDURE — 2709999900 HC NON-CHARGEABLE SUPPLY: Performed by: STUDENT IN AN ORGANIZED HEALTH CARE EDUCATION/TRAINING PROGRAM

## 2024-05-12 PROCEDURE — 7100000000 HC PACU RECOVERY - FIRST 15 MIN: Performed by: STUDENT IN AN ORGANIZED HEALTH CARE EDUCATION/TRAINING PROGRAM

## 2024-05-12 PROCEDURE — 2580000003 HC RX 258

## 2024-05-12 RX ORDER — ONDANSETRON 2 MG/ML
4 INJECTION INTRAMUSCULAR; INTRAVENOUS EVERY 6 HOURS PRN
Status: DISCONTINUED | OUTPATIENT
Start: 2024-05-12 | End: 2024-05-13 | Stop reason: HOSPADM

## 2024-05-12 RX ORDER — NALOXONE HYDROCHLORIDE 0.4 MG/ML
INJECTION, SOLUTION INTRAMUSCULAR; INTRAVENOUS; SUBCUTANEOUS PRN
Status: DISCONTINUED | OUTPATIENT
Start: 2024-05-12 | End: 2024-05-12 | Stop reason: HOSPADM

## 2024-05-12 RX ORDER — OXYCODONE HYDROCHLORIDE 5 MG/1
5 TABLET ORAL EVERY 4 HOURS PRN
Status: DISCONTINUED | OUTPATIENT
Start: 2024-05-12 | End: 2024-05-13 | Stop reason: HOSPADM

## 2024-05-12 RX ORDER — SODIUM CHLORIDE 0.9 % (FLUSH) 0.9 %
5-40 SYRINGE (ML) INJECTION EVERY 12 HOURS SCHEDULED
Status: DISCONTINUED | OUTPATIENT
Start: 2024-05-12 | End: 2024-05-13 | Stop reason: HOSPADM

## 2024-05-12 RX ORDER — ONDANSETRON 4 MG/1
4 TABLET, ORALLY DISINTEGRATING ORAL EVERY 8 HOURS PRN
Status: DISCONTINUED | OUTPATIENT
Start: 2024-05-12 | End: 2024-05-13 | Stop reason: HOSPADM

## 2024-05-12 RX ORDER — SODIUM CHLORIDE 0.9 % (FLUSH) 0.9 %
5-40 SYRINGE (ML) INJECTION PRN
Status: DISCONTINUED | OUTPATIENT
Start: 2024-05-12 | End: 2024-05-12 | Stop reason: HOSPADM

## 2024-05-12 RX ORDER — FENTANYL CITRATE 50 UG/ML
25 INJECTION, SOLUTION INTRAMUSCULAR; INTRAVENOUS EVERY 5 MIN PRN
Status: DISCONTINUED | OUTPATIENT
Start: 2024-05-12 | End: 2024-05-12 | Stop reason: HOSPADM

## 2024-05-12 RX ORDER — ONDANSETRON 2 MG/ML
4 INJECTION INTRAMUSCULAR; INTRAVENOUS
Status: DISCONTINUED | OUTPATIENT
Start: 2024-05-12 | End: 2024-05-12 | Stop reason: HOSPADM

## 2024-05-12 RX ORDER — ONDANSETRON 2 MG/ML
INJECTION INTRAMUSCULAR; INTRAVENOUS PRN
Status: DISCONTINUED | OUTPATIENT
Start: 2024-05-12 | End: 2024-05-12 | Stop reason: SDUPTHER

## 2024-05-12 RX ORDER — POTASSIUM CHLORIDE 7.45 MG/ML
10 INJECTION INTRAVENOUS PRN
Status: DISCONTINUED | OUTPATIENT
Start: 2024-05-12 | End: 2024-05-13 | Stop reason: HOSPADM

## 2024-05-12 RX ORDER — OXYCODONE HYDROCHLORIDE 5 MG/1
10 TABLET ORAL EVERY 4 HOURS PRN
Status: DISCONTINUED | OUTPATIENT
Start: 2024-05-12 | End: 2024-05-13 | Stop reason: HOSPADM

## 2024-05-12 RX ORDER — PROCHLORPERAZINE EDISYLATE 5 MG/ML
5 INJECTION INTRAMUSCULAR; INTRAVENOUS
Status: DISCONTINUED | OUTPATIENT
Start: 2024-05-12 | End: 2024-05-12 | Stop reason: HOSPADM

## 2024-05-12 RX ORDER — MAGNESIUM SULFATE IN WATER 40 MG/ML
2000 INJECTION, SOLUTION INTRAVENOUS PRN
Status: DISCONTINUED | OUTPATIENT
Start: 2024-05-12 | End: 2024-05-13 | Stop reason: HOSPADM

## 2024-05-12 RX ORDER — SODIUM CHLORIDE 0.9 % (FLUSH) 0.9 %
5-40 SYRINGE (ML) INJECTION EVERY 12 HOURS SCHEDULED
Status: DISCONTINUED | OUTPATIENT
Start: 2024-05-12 | End: 2024-05-12 | Stop reason: HOSPADM

## 2024-05-12 RX ORDER — SODIUM CHLORIDE, SODIUM LACTATE, POTASSIUM CHLORIDE, CALCIUM CHLORIDE 600; 310; 30; 20 MG/100ML; MG/100ML; MG/100ML; MG/100ML
INJECTION, SOLUTION INTRAVENOUS CONTINUOUS
Status: DISCONTINUED | OUTPATIENT
Start: 2024-05-12 | End: 2024-05-13

## 2024-05-12 RX ORDER — PROPOFOL 10 MG/ML
INJECTION, EMULSION INTRAVENOUS PRN
Status: DISCONTINUED | OUTPATIENT
Start: 2024-05-12 | End: 2024-05-12 | Stop reason: SDUPTHER

## 2024-05-12 RX ORDER — DIPHENHYDRAMINE HYDROCHLORIDE 50 MG/ML
12.5 INJECTION INTRAMUSCULAR; INTRAVENOUS
Status: DISCONTINUED | OUTPATIENT
Start: 2024-05-12 | End: 2024-05-12 | Stop reason: HOSPADM

## 2024-05-12 RX ORDER — GLUCAGON 1 MG/ML
1 KIT INJECTION PRN
Status: DISCONTINUED | OUTPATIENT
Start: 2024-05-12 | End: 2024-05-12 | Stop reason: HOSPADM

## 2024-05-12 RX ORDER — METRONIDAZOLE 500 MG/100ML
500 INJECTION, SOLUTION INTRAVENOUS EVERY 8 HOURS
Status: DISCONTINUED | OUTPATIENT
Start: 2024-05-12 | End: 2024-05-12

## 2024-05-12 RX ORDER — FENTANYL CITRATE 50 UG/ML
INJECTION, SOLUTION INTRAMUSCULAR; INTRAVENOUS PRN
Status: DISCONTINUED | OUTPATIENT
Start: 2024-05-12 | End: 2024-05-12 | Stop reason: SDUPTHER

## 2024-05-12 RX ORDER — SODIUM CHLORIDE 0.9 % (FLUSH) 0.9 %
5-40 SYRINGE (ML) INJECTION PRN
Status: DISCONTINUED | OUTPATIENT
Start: 2024-05-12 | End: 2024-05-13 | Stop reason: HOSPADM

## 2024-05-12 RX ORDER — DEXAMETHASONE SODIUM PHOSPHATE 4 MG/ML
INJECTION, SOLUTION INTRA-ARTICULAR; INTRALESIONAL; INTRAMUSCULAR; INTRAVENOUS; SOFT TISSUE PRN
Status: DISCONTINUED | OUTPATIENT
Start: 2024-05-12 | End: 2024-05-12 | Stop reason: SDUPTHER

## 2024-05-12 RX ORDER — SODIUM CHLORIDE 9 MG/ML
INJECTION, SOLUTION INTRAVENOUS PRN
Status: DISCONTINUED | OUTPATIENT
Start: 2024-05-12 | End: 2024-05-13 | Stop reason: HOSPADM

## 2024-05-12 RX ORDER — POTASSIUM CHLORIDE 20 MEQ/1
40 TABLET, EXTENDED RELEASE ORAL PRN
Status: DISCONTINUED | OUTPATIENT
Start: 2024-05-12 | End: 2024-05-13 | Stop reason: HOSPADM

## 2024-05-12 RX ORDER — IPRATROPIUM BROMIDE AND ALBUTEROL SULFATE 2.5; .5 MG/3ML; MG/3ML
1 SOLUTION RESPIRATORY (INHALATION)
Status: DISCONTINUED | OUTPATIENT
Start: 2024-05-12 | End: 2024-05-12 | Stop reason: HOSPADM

## 2024-05-12 RX ORDER — ROCURONIUM BROMIDE 10 MG/ML
INJECTION, SOLUTION INTRAVENOUS PRN
Status: DISCONTINUED | OUTPATIENT
Start: 2024-05-12 | End: 2024-05-12 | Stop reason: SDUPTHER

## 2024-05-12 RX ORDER — LIDOCAINE HYDROCHLORIDE 20 MG/ML
INJECTION, SOLUTION EPIDURAL; INFILTRATION; INTRACAUDAL; PERINEURAL PRN
Status: DISCONTINUED | OUTPATIENT
Start: 2024-05-12 | End: 2024-05-12 | Stop reason: SDUPTHER

## 2024-05-12 RX ORDER — DEXTROSE MONOHYDRATE 100 MG/ML
INJECTION, SOLUTION INTRAVENOUS CONTINUOUS PRN
Status: DISCONTINUED | OUTPATIENT
Start: 2024-05-12 | End: 2024-05-12 | Stop reason: HOSPADM

## 2024-05-12 RX ORDER — LABETALOL HYDROCHLORIDE 5 MG/ML
INJECTION, SOLUTION INTRAVENOUS PRN
Status: DISCONTINUED | OUTPATIENT
Start: 2024-05-12 | End: 2024-05-12 | Stop reason: SDUPTHER

## 2024-05-12 RX ORDER — MIDAZOLAM HYDROCHLORIDE 1 MG/ML
INJECTION INTRAMUSCULAR; INTRAVENOUS PRN
Status: DISCONTINUED | OUTPATIENT
Start: 2024-05-12 | End: 2024-05-12 | Stop reason: SDUPTHER

## 2024-05-12 RX ORDER — SODIUM CHLORIDE 9 MG/ML
INJECTION, SOLUTION INTRAVENOUS CONTINUOUS PRN
Status: DISCONTINUED | OUTPATIENT
Start: 2024-05-12 | End: 2024-05-12 | Stop reason: SDUPTHER

## 2024-05-12 RX ORDER — ENOXAPARIN SODIUM 100 MG/ML
30 INJECTION SUBCUTANEOUS 2 TIMES DAILY
Status: DISCONTINUED | OUTPATIENT
Start: 2024-05-12 | End: 2024-05-12

## 2024-05-12 RX ORDER — ENOXAPARIN SODIUM 100 MG/ML
30 INJECTION SUBCUTANEOUS 2 TIMES DAILY
Status: DISCONTINUED | OUTPATIENT
Start: 2024-05-13 | End: 2024-05-13 | Stop reason: HOSPADM

## 2024-05-12 RX ORDER — SODIUM CHLORIDE 9 MG/ML
INJECTION, SOLUTION INTRAVENOUS PRN
Status: DISCONTINUED | OUTPATIENT
Start: 2024-05-12 | End: 2024-05-12 | Stop reason: HOSPADM

## 2024-05-12 RX ORDER — KETOROLAC TROMETHAMINE 30 MG/ML
INJECTION, SOLUTION INTRAMUSCULAR; INTRAVENOUS PRN
Status: DISCONTINUED | OUTPATIENT
Start: 2024-05-12 | End: 2024-05-12 | Stop reason: SDUPTHER

## 2024-05-12 RX ADMIN — ONDANSETRON 4 MG: 2 INJECTION INTRAMUSCULAR; INTRAVENOUS at 10:03

## 2024-05-12 RX ADMIN — SODIUM CHLORIDE, POTASSIUM CHLORIDE, SODIUM LACTATE AND CALCIUM CHLORIDE: 600; 310; 30; 20 INJECTION, SOLUTION INTRAVENOUS at 08:35

## 2024-05-12 RX ADMIN — LABETALOL HYDROCHLORIDE 5 MG: 5 INJECTION, SOLUTION INTRAVENOUS at 18:36

## 2024-05-12 RX ADMIN — OXYCODONE 10 MG: 5 TABLET ORAL at 20:02

## 2024-05-12 RX ADMIN — PROPOFOL 200 MG: 10 INJECTION, EMULSION INTRAVENOUS at 17:04

## 2024-05-12 RX ADMIN — SODIUM CHLORIDE, POTASSIUM CHLORIDE, SODIUM LACTATE AND CALCIUM CHLORIDE: 600; 310; 30; 20 INJECTION, SOLUTION INTRAVENOUS at 20:06

## 2024-05-12 RX ADMIN — SODIUM CHLORIDE: 9 INJECTION, SOLUTION INTRAVENOUS at 17:40

## 2024-05-12 RX ADMIN — KETOROLAC TROMETHAMINE 30 MG: 30 INJECTION INTRAMUSCULAR; INTRAVENOUS at 18:12

## 2024-05-12 RX ADMIN — FENTANYL CITRATE 100 MCG: 50 INJECTION, SOLUTION INTRAMUSCULAR; INTRAVENOUS at 17:28

## 2024-05-12 RX ADMIN — HYDROMORPHONE HYDROCHLORIDE 0.5 MG: 1 INJECTION, SOLUTION INTRAMUSCULAR; INTRAVENOUS; SUBCUTANEOUS at 16:11

## 2024-05-12 RX ADMIN — HYDROMORPHONE HYDROCHLORIDE 0.5 MG: 1 INJECTION, SOLUTION INTRAMUSCULAR; INTRAVENOUS; SUBCUTANEOUS at 01:53

## 2024-05-12 RX ADMIN — FENTANYL CITRATE 100 MCG: 50 INJECTION, SOLUTION INTRAMUSCULAR; INTRAVENOUS at 17:04

## 2024-05-12 RX ADMIN — FENTANYL CITRATE 50 MCG: 50 INJECTION, SOLUTION INTRAMUSCULAR; INTRAVENOUS at 17:16

## 2024-05-12 RX ADMIN — SODIUM CHLORIDE, PRESERVATIVE FREE 10 ML: 5 INJECTION INTRAVENOUS at 08:36

## 2024-05-12 RX ADMIN — METRONIDAZOLE 500 MG: 500 INJECTION, SOLUTION INTRAVENOUS at 16:10

## 2024-05-12 RX ADMIN — ONDANSETRON 4 MG: 2 INJECTION INTRAMUSCULAR; INTRAVENOUS at 03:05

## 2024-05-12 RX ADMIN — DEXAMETHASONE SODIUM PHOSPHATE 10 MG: 4 INJECTION, SOLUTION INTRAMUSCULAR; INTRAVENOUS at 17:07

## 2024-05-12 RX ADMIN — HYDROMORPHONE HYDROCHLORIDE 0.5 MG: 1 INJECTION, SOLUTION INTRAMUSCULAR; INTRAVENOUS; SUBCUTANEOUS at 19:02

## 2024-05-12 RX ADMIN — ONDANSETRON 4 MG: 2 INJECTION INTRAMUSCULAR; INTRAVENOUS at 22:55

## 2024-05-12 RX ADMIN — LIDOCAINE HYDROCHLORIDE 100 MG: 20 INJECTION, SOLUTION EPIDURAL; INFILTRATION; INTRACAUDAL; PERINEURAL at 17:04

## 2024-05-12 RX ADMIN — ROCURONIUM BROMIDE 50 MG: 10 INJECTION, SOLUTION INTRAVENOUS at 17:04

## 2024-05-12 RX ADMIN — HYDROMORPHONE HYDROCHLORIDE 0.5 MG: 1 INJECTION, SOLUTION INTRAMUSCULAR; INTRAVENOUS; SUBCUTANEOUS at 18:57

## 2024-05-12 RX ADMIN — MIDAZOLAM 2 MG: 1 INJECTION INTRAMUSCULAR; INTRAVENOUS at 16:56

## 2024-05-12 RX ADMIN — SUGAMMADEX 300 MG: 100 INJECTION, SOLUTION INTRAVENOUS at 18:24

## 2024-05-12 RX ADMIN — HYDROMORPHONE HYDROCHLORIDE 0.5 MG: 1 INJECTION, SOLUTION INTRAMUSCULAR; INTRAVENOUS; SUBCUTANEOUS at 08:46

## 2024-05-12 RX ADMIN — HYDROMORPHONE HYDROCHLORIDE 0.5 MG: 1 INJECTION, SOLUTION INTRAMUSCULAR; INTRAVENOUS; SUBCUTANEOUS at 22:57

## 2024-05-12 RX ADMIN — LABETALOL HYDROCHLORIDE 5 MG: 5 INJECTION, SOLUTION INTRAVENOUS at 17:32

## 2024-05-12 RX ADMIN — FAMOTIDINE 20 MG: 10 INJECTION, SOLUTION INTRAVENOUS at 21:24

## 2024-05-12 RX ADMIN — ONDANSETRON 4 MG: 2 INJECTION INTRAMUSCULAR; INTRAVENOUS at 17:07

## 2024-05-12 RX ADMIN — SODIUM CHLORIDE, POTASSIUM CHLORIDE, SODIUM LACTATE AND CALCIUM CHLORIDE: 600; 310; 30; 20 INJECTION, SOLUTION INTRAVENOUS at 16:05

## 2024-05-12 RX ADMIN — SODIUM CHLORIDE: 9 INJECTION, SOLUTION INTRAVENOUS at 16:56

## 2024-05-12 RX ADMIN — OXYCODONE 10 MG: 5 TABLET ORAL at 10:51

## 2024-05-12 RX ADMIN — METRONIDAZOLE 500 MG: 500 INJECTION, SOLUTION INTRAVENOUS at 08:44

## 2024-05-12 ASSESSMENT — PAIN DESCRIPTION - ONSET
ONSET: ON-GOING

## 2024-05-12 ASSESSMENT — PAIN DESCRIPTION - DESCRIPTORS
DESCRIPTORS: DISCOMFORT;SHARP
DESCRIPTORS: SORE
DESCRIPTORS: ACHING;SORE
DESCRIPTORS: ACHING;SORE
DESCRIPTORS: DISCOMFORT;SHARP
DESCRIPTORS: SHARP;CRAMPING
DESCRIPTORS: ACHING;SORE
DESCRIPTORS: THROBBING;POUNDING;SHARP

## 2024-05-12 ASSESSMENT — PAIN SCALES - GENERAL
PAINLEVEL_OUTOF10: 7
PAINLEVEL_OUTOF10: 6
PAINLEVEL_OUTOF10: 9
PAINLEVEL_OUTOF10: 7
PAINLEVEL_OUTOF10: 4
PAINLEVEL_OUTOF10: 8
PAINLEVEL_OUTOF10: 7
PAINLEVEL_OUTOF10: 8
PAINLEVEL_OUTOF10: 7

## 2024-05-12 ASSESSMENT — PAIN DESCRIPTION - ORIENTATION
ORIENTATION: UPPER
ORIENTATION: ANTERIOR
ORIENTATION: RIGHT
ORIENTATION: RIGHT
ORIENTATION: UPPER
ORIENTATION: MID;RIGHT
ORIENTATION: RIGHT;UPPER

## 2024-05-12 ASSESSMENT — PAIN - FUNCTIONAL ASSESSMENT
PAIN_FUNCTIONAL_ASSESSMENT: ACTIVITIES ARE NOT PREVENTED
PAIN_FUNCTIONAL_ASSESSMENT: 0-10
PAIN_FUNCTIONAL_ASSESSMENT: ACTIVITIES ARE NOT PREVENTED

## 2024-05-12 ASSESSMENT — PAIN DESCRIPTION - PAIN TYPE
TYPE: ACUTE PAIN
TYPE: SURGICAL PAIN
TYPE: SURGICAL PAIN

## 2024-05-12 ASSESSMENT — PAIN DESCRIPTION - LOCATION
LOCATION: ABDOMEN
LOCATION: HEAD
LOCATION: ABDOMEN
LOCATION: ABDOMEN;FLANK;SHOULDER
LOCATION: FLANK

## 2024-05-12 ASSESSMENT — PAIN DESCRIPTION - FREQUENCY
FREQUENCY: INTERMITTENT
FREQUENCY: CONTINUOUS
FREQUENCY: CONTINUOUS

## 2024-05-12 NOTE — BRIEF OP NOTE
Brief Postoperative Note      Patient: Caterina Oliva  YOB: 1975  MRN: [id number]    Date of Procedure: 5/12/2024    Pre-Op Diagnosis Codes:     * Cholecystitis [K81.9]    Post-Op Diagnosis:  Gangrenous cholecystitis, no CBD stones       Procedure(s):  LAPAROSCOPIC CHOLECYSTECTOMY WITH INTRAOPERATIVE CHOLANGIOGRAM    Surgeon(s):  Freddie Esqueda MD    Assistant:  Surgical Assistant: Hai Douglas    Anesthesia: General    Estimated Blood Loss (mL): less than 50     Complications: None    Specimens:   ID Type Source Tests Collected by Time Destination   A : GALLBLADDER Tissue Gallbladder SURGICAL PATHOLOGY Freddie Esqueda MD 5/12/2024 1718        Implants:  * No implants in log *      Drains: * No LDAs found *    Findings:  Infection Present At Time Of Surgery (PATOS) (choose all levels that have infection present):  No infection present  Other Findings: Significant adhesions from the duodenum to the gallbladder and from the transverse colon to the gallbladder requiring sharp lysis of adhesions as well as blunt dissection.  Critical view of safety obtained.  Cholangiogram demonstrating no filling defects of the common bile duct.  Also noted to have a long cystic duct stump.    Electronically signed by Freddie Esqueda MD on 5/12/2024 at 6:16 PM

## 2024-05-12 NOTE — H&P
benefits alternatives discussed with patient all questions answered to patient's satisfaction.  -Continue antibiotics  -Hopeful for discharge following surgery if doing well.    I have examined the patient and performed the key aspects of physical exam.  I have independently reviewed the record including all pertinent and new radiology images and laboratory findings as well as reviewed all pertinent provider documentation), and discussed the case with the surgical team.  I agree with the assessment and plan with the following additions, corrections, and changes. 14pt review of symptoms completed and negative except as mentioned.    Freddie Esqueda MD

## 2024-05-12 NOTE — ED NOTES
ED to Inpatient Handoff Report    Notified Mena RN that electronic handoff available and patient ready for transport to room 0504.    Safety Risks: Risk of falls    Patient in Restraints: no    Constant Observer or Patient : no    Telemetry Monitoring Ordered :No           Order to transfer to unit without monitor:N/A    Last MEWS: 1 Time completed: 0159    Deterioration Index Score:   Predictive Model Details          19 (Normal)  Factor Value    Calculated 5/12/2024 02:00 55% Age 48 years old    Deterioration Index Model 16% Respiratory rate 18     12% Systolic 144     12% WBC count 11.1 k/uL     2% Potassium 3.7 mmol/L     1% Sodium 138 mmol/L     1% Hematocrit 43.3 %     1% Pulse oximetry 98 %     1% Pulse 68     0% Temperature 97.9 °F (36.6 °C)        Vitals:    05/11/24 2253 05/11/24 2355 05/12/24 0010 05/12/24 0159   BP:  (!) 162/102  (!) 144/96   Pulse: 64 68 96 68   Resp: 12 18 14 18   Temp:    97.9 °F (36.6 °C)   TempSrc:    Oral   SpO2:  100% 93% 98%   Weight:       Height:             Opportunity for questions and clarification was provided.
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
## 2024-05-12 NOTE — ED PROVIDER NOTES
Shared MARKUS-ED Attending Visit.  CC: No      [facility]  Department of Emergency Medicine   ED  Encounter Note  Admit Date/RoomTime: 2024  7:48 PM  ED Room:     NAME: Caterina Oliva  : 1975  MRN: [id number]     Chief Complaint:  Abdominal Pain (constant sharp right upper quad abd pain radiating into right flank starting on ; hx gastric sleeve may 26 2023; constipation throughout the week has been taking laxative) and Fall (Fell on , hit right side of abdomen where pain is, denies head injury or neck pain)    History of Present Illness       Caterina Oliva is a 48 y.o. old female who presents to the emergency department by private vehicle, for gradual onset, worsening colicky, cramping pain in the RUQ and in the RLQ which began 6 day(s) prior to arrival.   There has been no similar episodes in the past.  Since onset the symptoms have been gradually worsening.  The pain is associated with nausea and constipation.  The pain is aggravated by moving around and relieved by essential oil concoction she was making but they are not helping anymore.  There has been NO chest pain, shortness of breath, fever, chills, vomiting, urinary frequency, dysuria, or hematuria.   hysterectomy. Pt had a gastric sleeve in [facility] in 2023. Since then she has lost 150 lbs and has been able to go off all her prescription medications. She does take OTC omeprazole as needed.     ROS   Pertinent positives and negatives are stated within HPI, all other systems reviewed and are negative.    Past Medical History:  has a past medical history of Diverticulosis, GERD without esophagitis, Hyperlipidemia, Hypertension, Knee pain, Morbid obesity due to excess calories (HCC), Obesity, and Sleep apnea.    Surgical History has a past surgical history that includes Hysterectomy; Colonoscopy (); Colonoscopy (N/A, 2019); and Colonoscopy (2019).    Social History:  reports that she

## 2024-05-12 NOTE — ANESTHESIA POSTPROCEDURE EVALUATION
Department of Anesthesiology  Postprocedure Note    Patient: Caterina Oliva  MRN: [id number]  YOB: 1975  Date of evaluation: 5/12/2024    Procedure Summary       Date: 05/12/24 Room / Location: [address]    Anesthesia Start: 1656 Anesthesia Stop: 1837    Procedure: LAPAROSCOPIC CHOLECYSTECTOMY WITH INTRAOPERATIVE CHOLANGIOGRAM (Abdomen) Diagnosis:       Cholecystitis      (Cholecystitis [K81.9])    Surgeons: Freddie Esqueda MD Responsible Provider: Fareed Hall DO    Anesthesia Type: general ASA Status: 3            Anesthesia Type: No value filed.    Prashant Phase I:      Prashant Phase II:      Anesthesia Post Evaluation    Patient location during evaluation: PACU  Patient participation: complete - patient participated  Level of consciousness: awake  Pain score: 2  Airway patency: patent  Nausea & Vomiting: no nausea and no vomiting  Cardiovascular status: blood pressure returned to baseline and hemodynamically stable  Respiratory status: acceptable  Hydration status: euvolemic  Pain management: adequate        No notable events documented.

## 2024-05-12 NOTE — PROGRESS NOTES
Page out to Dr. Esqueda for admission orders. Awaiting call back or orders to be placed.   Electronically signed by Mena Miller RN on 5/12/2024 at 3:29 AM        Second page out to Dr. Esqueda for admission orders.   Electronically signed by Mena Miller RN on 5/12/2024 at 5:01 AM      Called answering service again, they were able to patch me through this time. Spoke to Dr. Esqueda, he will place orders.   Electronically signed by Mena Miller RN on 5/12/2024 at 6:18 AM

## 2024-05-13 VITALS
RESPIRATION RATE: 16 BRPM | OXYGEN SATURATION: 94 % | DIASTOLIC BLOOD PRESSURE: 84 MMHG | WEIGHT: 275.8 LBS | TEMPERATURE: 98.6 F | HEIGHT: 68 IN | HEART RATE: 74 BPM | BODY MASS INDEX: 41.8 KG/M2 | SYSTOLIC BLOOD PRESSURE: 162 MMHG

## 2024-05-13 LAB
ALBUMIN SERPL-MCNC: 3.8 G/DL (ref 3.5–5.2)
ALP SERPL-CCNC: 70 U/L (ref 35–104)
ALT SERPL-CCNC: 32 U/L (ref 0–32)
ANION GAP SERPL CALCULATED.3IONS-SCNC: 10 MMOL/L (ref 7–16)
AST SERPL-CCNC: 45 U/L (ref 0–31)
BILIRUB SERPL-MCNC: 0.6 MG/DL (ref 0–1.2)
BUN SERPL-MCNC: 8 MG/DL (ref 6–20)
C DIFF GDH + TOXINS A+B STL QL IA.RAPID: NEGATIVE
CALCIUM SERPL-MCNC: 8.6 MG/DL (ref 8.6–10.2)
CHLORIDE SERPL-SCNC: 101 MMOL/L (ref 98–107)
CO2 SERPL-SCNC: 26 MMOL/L (ref 22–29)
CREAT SERPL-MCNC: 0.7 MG/DL (ref 0.5–1)
ERYTHROCYTE [DISTWIDTH] IN BLOOD BY AUTOMATED COUNT: 12.5 % (ref 11.5–15)
GFR, ESTIMATED: >90 ML/MIN/1.73M2
GLUCOSE SERPL-MCNC: 170 MG/DL (ref 74–99)
HCT VFR BLD AUTO: 40.1 % (ref 34–48)
HGB BLD-MCNC: 13.6 G/DL (ref 11.5–15.5)
MCH RBC QN AUTO: 33 PG (ref 26–35)
MCHC RBC AUTO-ENTMCNC: 33.9 G/DL (ref 32–34.5)
MCV RBC AUTO: 97.3 FL (ref 80–99.9)
PLATELET # BLD AUTO: 189 K/UL (ref 130–450)
PMV BLD AUTO: 10.9 FL (ref 7–12)
POTASSIUM SERPL-SCNC: 3.8 MMOL/L (ref 3.5–5)
PROT SERPL-MCNC: 6.1 G/DL (ref 6.4–8.3)
RBC # BLD AUTO: 4.12 M/UL (ref 3.5–5.5)
SODIUM SERPL-SCNC: 137 MMOL/L (ref 132–146)
SPECIMEN DESCRIPTION: NORMAL
WBC OTHER # BLD: 9.2 K/UL (ref 4.5–11.5)

## 2024-05-13 PROCEDURE — A4216 STERILE WATER/SALINE, 10 ML: HCPCS | Performed by: STUDENT IN AN ORGANIZED HEALTH CARE EDUCATION/TRAINING PROGRAM

## 2024-05-13 PROCEDURE — 6360000002 HC RX W HCPCS: Performed by: STUDENT IN AN ORGANIZED HEALTH CARE EDUCATION/TRAINING PROGRAM

## 2024-05-13 PROCEDURE — 80053 COMPREHEN METABOLIC PANEL: CPT

## 2024-05-13 PROCEDURE — 6370000000 HC RX 637 (ALT 250 FOR IP): Performed by: STUDENT IN AN ORGANIZED HEALTH CARE EDUCATION/TRAINING PROGRAM

## 2024-05-13 PROCEDURE — 2580000003 HC RX 258: Performed by: STUDENT IN AN ORGANIZED HEALTH CARE EDUCATION/TRAINING PROGRAM

## 2024-05-13 PROCEDURE — 2500000003 HC RX 250 WO HCPCS: Performed by: STUDENT IN AN ORGANIZED HEALTH CARE EDUCATION/TRAINING PROGRAM

## 2024-05-13 PROCEDURE — 85027 COMPLETE CBC AUTOMATED: CPT

## 2024-05-13 RX ORDER — ONDANSETRON 4 MG/1
4 TABLET, ORALLY DISINTEGRATING ORAL EVERY 8 HOURS PRN
Qty: 60 TABLET | Refills: 0 | Status: SHIPPED | OUTPATIENT
Start: 2024-05-13

## 2024-05-13 RX ORDER — OXYCODONE HYDROCHLORIDE 5 MG/1
5 TABLET ORAL EVERY 6 HOURS PRN
Qty: 20 TABLET | Refills: 0 | Status: SHIPPED | OUTPATIENT
Start: 2024-05-13 | End: 2024-05-18

## 2024-05-13 RX ADMIN — HYDROMORPHONE HYDROCHLORIDE 0.5 MG: 1 INJECTION, SOLUTION INTRAMUSCULAR; INTRAVENOUS; SUBCUTANEOUS at 03:03

## 2024-05-13 RX ADMIN — FAMOTIDINE 20 MG: 10 INJECTION, SOLUTION INTRAVENOUS at 07:49

## 2024-05-13 RX ADMIN — SODIUM CHLORIDE, PRESERVATIVE FREE 10 ML: 5 INJECTION INTRAVENOUS at 07:55

## 2024-05-13 RX ADMIN — OXYCODONE 10 MG: 5 TABLET ORAL at 07:48

## 2024-05-13 RX ADMIN — SODIUM CHLORIDE, POTASSIUM CHLORIDE, SODIUM LACTATE AND CALCIUM CHLORIDE: 600; 310; 30; 20 INJECTION, SOLUTION INTRAVENOUS at 03:00

## 2024-05-13 RX ADMIN — ENOXAPARIN SODIUM 30 MG: 100 INJECTION SUBCUTANEOUS at 07:49

## 2024-05-13 ASSESSMENT — PAIN DESCRIPTION - LOCATION
LOCATION: ABDOMEN
LOCATION: ABDOMEN

## 2024-05-13 ASSESSMENT — PAIN SCALES - GENERAL
PAINLEVEL_OUTOF10: 0
PAINLEVEL_OUTOF10: 8
PAINLEVEL_OUTOF10: 10

## 2024-05-13 ASSESSMENT — PAIN DESCRIPTION - FREQUENCY: FREQUENCY: CONTINUOUS

## 2024-05-13 ASSESSMENT — PAIN DESCRIPTION - PAIN TYPE: TYPE: SURGICAL PAIN

## 2024-05-13 ASSESSMENT — PAIN DESCRIPTION - DESCRIPTORS
DESCRIPTORS: SHARP
DESCRIPTORS: DISCOMFORT;SHARP

## 2024-05-13 ASSESSMENT — PAIN DESCRIPTION - ONSET: ONSET: ON-GOING

## 2024-05-13 ASSESSMENT — PAIN DESCRIPTION - ORIENTATION
ORIENTATION: RIGHT;UPPER
ORIENTATION: RIGHT

## 2024-05-13 NOTE — CARE COORDINATION
Chart Reviewed. Patient is from home independently. Patient is established with a PCP and has insurance. Patient admitted with abdominal pain after having a gastric sleeve done in [place]. Patient underwent lap reinier yesterday. Discharge order placed. Anticipate no needs at discharge.   Electronically signed by Neli Phillips RN on 5/13/2024 at 9:06 AM

## 2024-05-13 NOTE — PROGRESS NOTES
Patient stated that she is currently refusing to follow up with surgery after discharge because she was \"unhappy with their care.\" Patient educated on importance of follow up, but she insisted that she would be fine and would just follow up with her primary.

## 2024-05-13 NOTE — DISCHARGE SUMMARY
Physician Discharge Summary     Patient ID:  Caterina Oliva  [id number]  48 y.o.  1975    Admit date: 5/11/2024    Discharge date and time: No discharge date for patient encounter.     Admitting Physician: Freddie Esqueda MD     Admission Diagnoses: Acute cholecystitis [K81.0]    Discharge Diagnoses: Principal Problem:    Acute cholecystitis  Resolved Problems:    * No resolved hospital problems. *      Admission Condition: good    Discharged Condition: stable      Hospital Course:  Caterina Oliva is a 48 y.o. female who presented with abdominal pain. Work up revealed cholecystitis. The patient's course was otherwise uneventful. She progressed well, pain was controlled on PO medications. She was tolerating a regular diet with no nausea or vomiting, and was in a suitable condition for discharge to home in stable condition.      Consults:   IP CONSULT TO GENERAL SURGERY    Significant Diagnostic Studies:   FLUORO FOR SURGICAL PROCEDURES    Result Date: 5/12/2024  EXAMINATION: SPOT FLUOROSCOPIC IMAGES 5/12/2024 6:47 pm TECHNIQUE: Fluoroscopy was provided by the radiology department for procedure. Radiologist was not present during examination. FLUOROSCOPY DOSE AND TYPE: Radiation Exposure Index: Kerma mGy, 18.1 COMPARISON: None HISTORY: ORDERING SYSTEM PROVIDED HISTORY: cholangiograms TECHNOLOGIST PROVIDED HISTORY: Reason for exam:->cholangiograms Intraprocedural imaging. FINDINGS: Fluoroscopic images were obtained for procedural assistance. Diagnostic evaluation is not performed at this time.     Intraprocedural fluoroscopic spot images as above.  See separate procedure report for more information.     CT ABDOMEN PELVIS W IV CONTRAST Additional Contrast? None    Result Date: 5/11/2024  EXAMINATION: CT OF THE ABDOMEN AND PELVIS WITH CONTRAST 5/11/2024 9:38 pm TECHNIQUE: CT of the abdomen and pelvis was performed with the administration of intravenous contrast. Multiplanar reformatted images are provided for

## 2024-05-13 NOTE — PROGRESS NOTES
CLINICAL PHARMACY NOTE: MEDS TO BEDS    Total # of Prescriptions Filled: 2   The following medications were delivered to the patient:  Oxycodone 5 mg   Zofran 4 mg ODT      Additional Documentation:

## 2024-05-13 NOTE — DISCHARGE INSTRUCTIONS
Instructions After Abdominal Surgery    You had abdominal surgery at [facility].  Please follow the instructions on this sheet for your follow-up care and make an appointment for a follow-up visit.    Activity/Sleep/Return to Work  Unless instructed otherwise, you may walk, climb stairs, ride as a passenger in a car, and engage in other activities of daily living as tolerated.  It is normal to feel fatigued and require more sleep than usual during your recovery.  Also, major surgery and being in the hospital can disrupt sleep patterns.  We recommend allowing sleep patterns to return to normal over time and avoid sleep medication unless previously prescribed.  Avoid heavy lifting (> 10 pounds or more) for 6 weeks.  Avoid driving for 2 weeks or as long as you have pain and are taking narcotic pain medication.  You may resume work as tolerated unless instructed otherwise or if your work involves heavy lifting.  Please bring any forms related to work, insurance, or disability to your first postoperative visit.    Bowel Movements  It is common to have irregular, loose watery stools for several days after abdominal surgery.  If watery diarrhea lasts more than a few days or you have more than 8 large volume liquid bowel movements in one day then call the office.  You may have altered bacteria in your colon and need a prescription for an antibiotic.  Avoid caffeine and alcohol (they alter bowel activity and can contribute to diarrhea or constipation).  You may take Mineral Oil 1 tablespoon twice daily to soften your stool.  If you have diarrhea, stop this medication.  If you have constipation and feel uncomfortable, then please call the office during office hours.    Urination    Patients who had a urinary catheter (“Watson”) placed for surgery often have minor discomfort with urination for several days after removal of the catheter.  If discomfort persists or worsens, an infection may have occurred and

## 2024-05-13 NOTE — PLAN OF CARE
Problem: Pain  Goal: Verbalizes/displays adequate comfort level or baseline comfort level  5/13/2024 0915 by Tano Alanis RN  Outcome: Adequate for Discharge  5/12/2024 2138 by Mena Miller RN  Outcome: Progressing     Problem: Safety - Adult  Goal: Free from fall injury  5/13/2024 0915 by Tano Alanis RN  Outcome: Adequate for Discharge  5/12/2024 2138 by Mena Miller RN  Outcome: Progressing     Problem: ABCDS Injury Assessment  Goal: Absence of physical injury  5/13/2024 0915 by Tano Alanis RN  Outcome: Adequate for Discharge  5/12/2024 2138 by Mena Miller RN  Outcome: Progressing     Problem: Chronic Conditions and Co-morbidities  Goal: Patient's chronic conditions and co-morbidity symptoms are monitored and maintained or improved  5/13/2024 0915 by Tano Alanis RN  Outcome: Adequate for Discharge  5/12/2024 2138 by Mena Miller RN  Outcome: Progressing     Problem: Discharge Planning  Goal: Discharge to home or other facility with appropriate resources  5/13/2024 0915 by Tano Alanis RN  Outcome: Adequate for Discharge  5/12/2024 2138 by Mena Miller RN  Outcome: Progressing

## 2024-05-13 NOTE — PATIENT CARE CONFERENCE
P Quality Flow/Interdisciplinary Rounds Progress Note        Quality Flow Rounds held on May 13, 2024    Disciplines Attending:  Bedside Nurse, , , and Nursing Unit Leadership    Caterina Oliva was admitted on 5/11/2024  7:48 PM    Anticipated Discharge Date:       Disposition:    Donn Score:  Donn Scale Score: 21    Readmission Risk              Risk of Unplanned Readmission:  8           Discussed patient goal for the day, patient clinical progression, and barriers to discharge.  The following Goal(s) of the Day/Commitment(s) have been identified:  Labs - Report Results      Shraddha Padilla RN  May 13, 2024

## 2024-05-14 LAB
MICROORGANISM SPEC CULT: NORMAL
SPECIMEN DESCRIPTION: NORMAL

## 2024-05-15 LAB — SURGICAL PATHOLOGY REPORT: NORMAL

## 2024-05-15 NOTE — OP NOTE
Operative Note      Patient: Caterina Oliva  YOB: 1975  MRN: [id number]    Date of Procedure: 5/12/2024    Pre-Op Diagnosis Codes:     * Cholecystitis [K81.9]    Post-Op Diagnosis:  Gangrenous cholecystitis, no CBD stones       Procedure(s):  LAPAROSCOPIC CHOLECYSTECTOMY WITH INTRAOPERATIVE CHOLANGIOGRAM (Modifier 22)    Surgeon(s):  Freddie Esqueda MD    Assistant:   Surgical Assistant: Hai Douglas    Anesthesia: General    Estimated Blood Loss (mL): less than 50     Complications: None    Specimens:   ID Type Source Tests Collected by Time Destination   A : GALLBLADDER Tissue Gallbladder SURGICAL PATHOLOGY Freddie Esqueda MD 5/12/2024 1718        Implants:  * No implants in log *      Drains: * No LDAs found *    Findings:  Infection Present At Time Of Surgery (PATOS) (choose all levels that have infection present):  No infection present  Other Findings:  Significant adhesions from the duodenum to the gallbladder and from the transverse colon to the gallbladder requiring sharp lysis of adhesions as well as blunt dissection. Critical view of safety obtained. Cholangiogram demonstrating no filling defects of the common bile duct. Also noted to have a long cystic duct stump.     Detailed Description of Procedure:     Patient was brought back to the operating room and laid supine on the operating room table.  Anesthesia was induced patient was prepped and draped in the typical sterile fashion.  A timeout was conducted.  At this time a stab incision was made at Ye's point a varies needle was inserted.  The abdomen was insufflated to a pressure of 15 mmHg.  At this time a supraumbilical 5 mm port was placed under direct realization using Optiview technique.  There were no noted entry injuries.  At this time 12 mm port was placed in the subxiphoid location and 2 5 mm assist ports were placed on the right lateral abdomen.  Gallbladder was grasped and retracted cephalad and laterally.  There

## 2024-06-16 SDOH — HEALTH STABILITY: PHYSICAL HEALTH: ON AVERAGE, HOW MANY DAYS PER WEEK DO YOU ENGAGE IN MODERATE TO STRENUOUS EXERCISE (LIKE A BRISK WALK)?: 3 DAYS

## 2024-06-16 SDOH — HEALTH STABILITY: PHYSICAL HEALTH: ON AVERAGE, HOW MANY MINUTES DO YOU ENGAGE IN EXERCISE AT THIS LEVEL?: 30 MIN

## 2024-06-17 ENCOUNTER — OFFICE VISIT (OUTPATIENT)
Dept: PRIMARY CARE CLINIC | Age: 49
End: 2024-06-17

## 2024-06-17 VITALS
HEART RATE: 70 BPM | BODY MASS INDEX: 36.98 KG/M2 | RESPIRATION RATE: 16 BRPM | DIASTOLIC BLOOD PRESSURE: 80 MMHG | OXYGEN SATURATION: 97 % | HEIGHT: 68 IN | SYSTOLIC BLOOD PRESSURE: 124 MMHG | TEMPERATURE: 97 F | WEIGHT: 244 LBS

## 2024-06-17 DIAGNOSIS — Z90.49 HX OF CHOLECYSTECTOMY: ICD-10-CM

## 2024-06-17 DIAGNOSIS — E66.09 CLASS 2 OBESITY DUE TO EXCESS CALORIES WITHOUT SERIOUS COMORBIDITY WITH BODY MASS INDEX (BMI) OF 37.0 TO 37.9 IN ADULT: ICD-10-CM

## 2024-06-17 DIAGNOSIS — E74.39 GLUCOSE INTOLERANCE: Primary | ICD-10-CM

## 2024-06-17 LAB — HBA1C MFR BLD: 4.6 %

## 2024-06-17 PROCEDURE — 99204 OFFICE O/P NEW MOD 45 MIN: CPT | Performed by: INTERNAL MEDICINE

## 2024-06-17 PROCEDURE — 3074F SYST BP LT 130 MM HG: CPT | Performed by: INTERNAL MEDICINE

## 2024-06-17 PROCEDURE — 3079F DIAST BP 80-89 MM HG: CPT | Performed by: INTERNAL MEDICINE

## 2024-06-17 PROCEDURE — 83036 HEMOGLOBIN GLYCOSYLATED A1C: CPT | Performed by: INTERNAL MEDICINE

## 2024-06-17 SDOH — ECONOMIC STABILITY: FOOD INSECURITY: WITHIN THE PAST 12 MONTHS, YOU WORRIED THAT YOUR FOOD WOULD RUN OUT BEFORE YOU GOT MONEY TO BUY MORE.: NEVER TRUE

## 2024-06-17 SDOH — ECONOMIC STABILITY: HOUSING INSECURITY
IN THE LAST 12 MONTHS, WAS THERE A TIME WHEN YOU DID NOT HAVE A STEADY PLACE TO SLEEP OR SLEPT IN A SHELTER (INCLUDING NOW)?: NO

## 2024-06-17 SDOH — ECONOMIC STABILITY: FOOD INSECURITY: WITHIN THE PAST 12 MONTHS, THE FOOD YOU BOUGHT JUST DIDN'T LAST AND YOU DIDN'T HAVE MONEY TO GET MORE.: NEVER TRUE

## 2024-06-17 SDOH — ECONOMIC STABILITY: INCOME INSECURITY: HOW HARD IS IT FOR YOU TO PAY FOR THE VERY BASICS LIKE FOOD, HOUSING, MEDICAL CARE, AND HEATING?: NOT HARD AT ALL

## 2024-06-17 ASSESSMENT — ENCOUNTER SYMPTOMS
VOMITING: 0
DIARRHEA: 0
NAUSEA: 0
COUGH: 0
ABDOMINAL PAIN: 0
SHORTNESS OF BREATH: 0

## 2024-06-17 ASSESSMENT — PATIENT HEALTH QUESTIONNAIRE - PHQ9
SUM OF ALL RESPONSES TO PHQ QUESTIONS 1-9: 0
SUM OF ALL RESPONSES TO PHQ QUESTIONS 1-9: 0
5. POOR APPETITE OR OVEREATING: NOT AT ALL
10. IF YOU CHECKED OFF ANY PROBLEMS, HOW DIFFICULT HAVE THESE PROBLEMS MADE IT FOR YOU TO DO YOUR WORK, TAKE CARE OF THINGS AT HOME, OR GET ALONG WITH OTHER PEOPLE: NOT DIFFICULT AT ALL
SUM OF ALL RESPONSES TO PHQ QUESTIONS 1-9: 0
3. TROUBLE FALLING OR STAYING ASLEEP: NOT AT ALL
8. MOVING OR SPEAKING SO SLOWLY THAT OTHER PEOPLE COULD HAVE NOTICED. OR THE OPPOSITE, BEING SO FIGETY OR RESTLESS THAT YOU HAVE BEEN MOVING AROUND A LOT MORE THAN USUAL: NOT AT ALL
7. TROUBLE CONCENTRATING ON THINGS, SUCH AS READING THE NEWSPAPER OR WATCHING TELEVISION: NOT AT ALL
9. THOUGHTS THAT YOU WOULD BE BETTER OFF DEAD, OR OF HURTING YOURSELF: NOT AT ALL
2. FEELING DOWN, DEPRESSED OR HOPELESS: NOT AT ALL
4. FEELING TIRED OR HAVING LITTLE ENERGY: NOT AT ALL
SUM OF ALL RESPONSES TO PHQ9 QUESTIONS 1 & 2: 0
1. LITTLE INTEREST OR PLEASURE IN DOING THINGS: NOT AT ALL
6. FEELING BAD ABOUT YOURSELF - OR THAT YOU ARE A FAILURE OR HAVE LET YOURSELF OR YOUR FAMILY DOWN: NOT AT ALL
SUM OF ALL RESPONSES TO PHQ QUESTIONS 1-9: 0

## 2024-06-17 NOTE — PROGRESS NOTES
SUBJECTIVE  Caterina Oliva is a 49 y.o. female new  was seen In the office  for evaluation.    HPI/Chief C/O:  Chief Complaint   Patient presents with    New Patient     Patient had her gallbladder removed in may needs a referral to gastro    Doing fine diarrhea slowly resolving , epigastric twitching post meal improving   Had previous gastric bypass about 2 years ago in [place]   Allergies   Allergen Reactions    No Known Allergies        ROS:  Review of Systems   Respiratory:  Negative for cough and shortness of breath.         Negative for Hemoptysis   Cardiovascular:  Negative for chest pain.   Gastrointestinal:  Negative for abdominal pain, diarrhea, nausea and vomiting.   Endocrine: Negative for polydipsia, polyphagia and polyuria.   Genitourinary:  Negative for dysuria and hematuria.   Skin:  Negative for rash.   Neurological:  Negative for tremors and seizures.        Past Medical/Surgical Hx;  Reviewed with patient      Diagnosis Date    Cerebral artery occlusion with cerebral infarction (HCC)     Diverticulosis     GERD without esophagitis     Hyperlipidemia     Hypertension     Irritable bowel syndrome     Knee pain     Morbid obesity due to excess calories (HCC)     Obesity     Sleep apnea     CPAP setting 11     Past Surgical History:   Procedure Laterality Date    CHOLECYSTECTOMY, LAPAROSCOPIC N/A 5/12/2024    LAPAROSCOPIC CHOLECYSTECTOMY WITH INTRAOPERATIVE CHOLANGIOGRAM performed by Freddie Esqueda MD at [facility] OR    COLONOSCOPY  2013    COLONOSCOPY N/A 01/25/2019    COLONOSCOPY DIAGNOSTIC performed by Woodrow Ortiz MD at [facility] ENDOSCOPY    COLONOSCOPY  11/04/2019    HYSTERECTOMY (CERVIX STATUS UNKNOWN)      JOINT REPLACEMENT Right     R knee 2021       Past Family Hx:  Reviewed with patient      Problem Relation Age of Onset    Heart Attack Mother        Social Hx:  Reviewed with patient  Social History     Tobacco Use    Smoking status: Never    Smokeless tobacco: Never   Substance Use Topics

## 2024-07-23 ENCOUNTER — TELEPHONE (OUTPATIENT)
Dept: PRIMARY CARE CLINIC | Age: 49
End: 2024-07-23

## 2024-07-23 NOTE — TELEPHONE ENCOUNTER
----- Message from Parth Britt sent at 7/23/2024 12:59 PM EDT -----  Regarding: ECC Escalation To Practice  ECC Escalation To Practice      Type of Escalation: Red Flag Symptom  --------------------------------------------------------------------------------------------------------------------------    Information for Provider:  Patient is looking for appointment for: Symptom EMOTIONAL DISTRESS  Reasons for Message: Unable to reach practice     Additional Information//PATIENT EXPERIENCE EMOTIONAL DISTRESS  --------------------------------------------------------------------------------------------------------------------------    Relationship to Patient: Self     Call Back Info: OK to leave message on voicemail  Preferred Call Back Number: Phone [phone] (home)

## 2024-07-23 NOTE — TELEPHONE ENCOUNTER
----- Message from Parth Britt sent at 7/23/2024 12:59 PM EDT -----  Regarding: ECC Escalation To Practice  ECC Escalation To Practice      Type of Escalation: Red Flag Symptom  --------------------------------------------------------------------------------------------------------------------------    Information for Provider:  Patient is looking for appointment for: Symptom EMOTIONAL DISTRESS  Reasons for Message: Unable to reach practice     Additional Information//PATIENT EXPERIENCE EMOTIONAL DISTRESS  --------------------------------------------------------------------------------------------------------------------------    Relationship to Patient: Self     Call Back Info: OK to leave message on voicemail  Preferred Call Back Number: Phone [phone] (home)

## 2024-07-23 NOTE — TELEPHONE ENCOUNTER
Spoke with patient , patient said she felt broken and she was very depressed, she is out of toen and can not come to the office, I ask the patient if she had thoughts on harming herself or others she said no, I told the patient that I will go ask dr. Henok wiseman what he wants her to do, after speaking dr henok wiseman he said that he will senda medication in for her and she needs to make a follow up appt when she comes back to [facility]

## 2024-08-13 ENCOUNTER — HOSPITAL ENCOUNTER (EMERGENCY)
Age: 49
Discharge: HOME | End: 2024-08-13
Attending: EMERGENCY MEDICINE

## 2024-08-13 ENCOUNTER — APPOINTMENT (OUTPATIENT)
Dept: GENERAL RADIOLOGY | Age: 49
End: 2024-08-13

## 2024-08-13 ENCOUNTER — APPOINTMENT (OUTPATIENT)
Dept: CT IMAGING | Age: 49
End: 2024-08-13

## 2024-08-13 VITALS
RESPIRATION RATE: 18 BRPM | SYSTOLIC BLOOD PRESSURE: 178 MMHG | TEMPERATURE: 98.5 F | OXYGEN SATURATION: 98 % | HEIGHT: 68 IN | BODY MASS INDEX: 36.98 KG/M2 | DIASTOLIC BLOOD PRESSURE: 92 MMHG | WEIGHT: 244 LBS | HEART RATE: 86 BPM

## 2024-08-13 DIAGNOSIS — R25.1 TREMORS OF NERVOUS SYSTEM: ICD-10-CM

## 2024-08-13 DIAGNOSIS — E87.6 HYPOKALEMIA: ICD-10-CM

## 2024-08-13 DIAGNOSIS — E83.42 HYPOMAGNESEMIA: Primary | ICD-10-CM

## 2024-08-13 LAB
ALBUMIN SERPL-MCNC: 4.3 G/DL (ref 3.5–5.2)
ALP SERPL-CCNC: 82 U/L (ref 35–104)
ALT SERPL-CCNC: 36 U/L (ref 0–32)
ANION GAP SERPL CALCULATED.3IONS-SCNC: 20 MMOL/L (ref 7–16)
AST SERPL-CCNC: 33 U/L (ref 0–31)
BASOPHILS # BLD: 0.06 K/UL (ref 0–0.2)
BASOPHILS NFR BLD: 1 % (ref 0–2)
BILIRUB SERPL-MCNC: 1.1 MG/DL (ref 0–1.2)
BUN SERPL-MCNC: 9 MG/DL (ref 6–20)
CALCIUM SERPL-MCNC: 8.7 MG/DL (ref 8.6–10.2)
CHLORIDE SERPL-SCNC: 101 MMOL/L (ref 98–107)
CO2 SERPL-SCNC: 20 MMOL/L (ref 22–29)
CREAT SERPL-MCNC: 0.6 MG/DL (ref 0.5–1)
EOSINOPHIL # BLD: 0.04 K/UL (ref 0.05–0.5)
EOSINOPHILS RELATIVE PERCENT: 1 % (ref 0–6)
ERYTHROCYTE [DISTWIDTH] IN BLOOD BY AUTOMATED COUNT: 12 % (ref 11.5–15)
GFR, ESTIMATED: >90 ML/MIN/1.73M2
GLUCOSE SERPL-MCNC: 111 MG/DL (ref 74–99)
HCT VFR BLD AUTO: 41.2 % (ref 34–48)
HGB BLD-MCNC: 14.9 G/DL (ref 11.5–15.5)
IMM GRANULOCYTES # BLD AUTO: <0.03 K/UL (ref 0–0.58)
IMM GRANULOCYTES NFR BLD: 0 % (ref 0–5)
LYMPHOCYTES NFR BLD: 0.94 K/UL (ref 1.5–4)
LYMPHOCYTES RELATIVE PERCENT: 22 % (ref 20–42)
MAGNESIUM SERPL-MCNC: 1.4 MG/DL (ref 1.6–2.6)
MCH RBC QN AUTO: 32.4 PG (ref 26–35)
MCHC RBC AUTO-ENTMCNC: 36.2 G/DL (ref 32–34.5)
MCV RBC AUTO: 89.6 FL (ref 80–99.9)
MONOCYTES NFR BLD: 0.34 K/UL (ref 0.1–0.95)
MONOCYTES NFR BLD: 8 % (ref 2–12)
NEUTROPHILS NFR BLD: 68 % (ref 43–80)
NEUTS SEG NFR BLD: 2.99 K/UL (ref 1.8–7.3)
PLATELET # BLD AUTO: 217 K/UL (ref 130–450)
PMV BLD AUTO: 10 FL (ref 7–12)
POTASSIUM SERPL-SCNC: 3.2 MMOL/L (ref 3.5–5)
PROT SERPL-MCNC: 7 G/DL (ref 6.4–8.3)
RBC # BLD AUTO: 4.6 M/UL (ref 3.5–5.5)
SODIUM SERPL-SCNC: 141 MMOL/L (ref 132–146)
WBC OTHER # BLD: 4.4 K/UL (ref 4.5–11.5)

## 2024-08-13 PROCEDURE — 96366 THER/PROPH/DIAG IV INF ADDON: CPT

## 2024-08-13 PROCEDURE — 71046 X-RAY EXAM CHEST 2 VIEWS: CPT

## 2024-08-13 PROCEDURE — 72170 X-RAY EXAM OF PELVIS: CPT

## 2024-08-13 PROCEDURE — 85025 COMPLETE CBC W/AUTO DIFF WBC: CPT

## 2024-08-13 PROCEDURE — 70450 CT HEAD/BRAIN W/O DYE: CPT

## 2024-08-13 PROCEDURE — 6370000000 HC RX 637 (ALT 250 FOR IP)

## 2024-08-13 PROCEDURE — 80053 COMPREHEN METABOLIC PANEL: CPT

## 2024-08-13 PROCEDURE — 6370000000 HC RX 637 (ALT 250 FOR IP): Performed by: EMERGENCY MEDICINE

## 2024-08-13 PROCEDURE — 83735 ASSAY OF MAGNESIUM: CPT

## 2024-08-13 PROCEDURE — 6360000002 HC RX W HCPCS: Performed by: EMERGENCY MEDICINE

## 2024-08-13 PROCEDURE — 99285 EMERGENCY DEPT VISIT HI MDM: CPT

## 2024-08-13 PROCEDURE — 96365 THER/PROPH/DIAG IV INF INIT: CPT

## 2024-08-13 RX ORDER — SODIUM CHLORIDE 0.9 % (FLUSH) 0.9 %
5-40 SYRINGE (ML) INJECTION EVERY 12 HOURS SCHEDULED
Status: DISCONTINUED | OUTPATIENT
Start: 2024-08-13 | End: 2024-08-13 | Stop reason: HOSPADM

## 2024-08-13 RX ORDER — LORAZEPAM 2 MG/ML
1 INJECTION INTRAMUSCULAR
Status: DISCONTINUED | OUTPATIENT
Start: 2024-08-13 | End: 2024-08-13 | Stop reason: HOSPADM

## 2024-08-13 RX ORDER — POTASSIUM CHLORIDE 20 MEQ/1
20 TABLET, EXTENDED RELEASE ORAL ONCE
Status: COMPLETED | OUTPATIENT
Start: 2024-08-13 | End: 2024-08-13

## 2024-08-13 RX ORDER — MAGNESIUM SULFATE IN WATER 40 MG/ML
2000 INJECTION, SOLUTION INTRAVENOUS ONCE
Status: COMPLETED | OUTPATIENT
Start: 2024-08-13 | End: 2024-08-13

## 2024-08-13 RX ORDER — LORAZEPAM 2 MG/ML
2 INJECTION INTRAMUSCULAR
Status: DISCONTINUED | OUTPATIENT
Start: 2024-08-13 | End: 2024-08-13 | Stop reason: HOSPADM

## 2024-08-13 RX ORDER — POTASSIUM CHLORIDE 750 MG/1
10 TABLET, EXTENDED RELEASE ORAL 2 TIMES DAILY
Qty: 60 TABLET | Refills: 0 | Status: SHIPPED | OUTPATIENT
Start: 2024-08-13

## 2024-08-13 RX ORDER — MAGNESIUM 30 MG
30 TABLET ORAL 2 TIMES DAILY
Qty: 30 TABLET | Refills: 3 | Status: SHIPPED | OUTPATIENT
Start: 2024-08-13 | End: 2024-08-16 | Stop reason: SDUPTHER

## 2024-08-13 RX ORDER — LORAZEPAM 1 MG/1
1 TABLET ORAL
Status: DISCONTINUED | OUTPATIENT
Start: 2024-08-13 | End: 2024-08-13 | Stop reason: HOSPADM

## 2024-08-13 RX ORDER — LORAZEPAM 2 MG/ML
4 INJECTION INTRAMUSCULAR
Status: DISCONTINUED | OUTPATIENT
Start: 2024-08-13 | End: 2024-08-13 | Stop reason: HOSPADM

## 2024-08-13 RX ORDER — SODIUM CHLORIDE 0.9 % (FLUSH) 0.9 %
5-40 SYRINGE (ML) INJECTION PRN
Status: DISCONTINUED | OUTPATIENT
Start: 2024-08-13 | End: 2024-08-13 | Stop reason: HOSPADM

## 2024-08-13 RX ORDER — FOLIC ACID 1 MG/1
1 TABLET ORAL DAILY
Status: DISCONTINUED | OUTPATIENT
Start: 2024-08-13 | End: 2024-08-13 | Stop reason: HOSPADM

## 2024-08-13 RX ORDER — SODIUM CHLORIDE 9 MG/ML
INJECTION, SOLUTION INTRAVENOUS PRN
Status: DISCONTINUED | OUTPATIENT
Start: 2024-08-13 | End: 2024-08-13 | Stop reason: HOSPADM

## 2024-08-13 RX ORDER — LORAZEPAM 1 MG/1
3 TABLET ORAL
Status: DISCONTINUED | OUTPATIENT
Start: 2024-08-13 | End: 2024-08-13 | Stop reason: HOSPADM

## 2024-08-13 RX ORDER — LANOLIN ALCOHOL/MO/W.PET/CERES
100 CREAM (GRAM) TOPICAL DAILY
Status: DISCONTINUED | OUTPATIENT
Start: 2024-08-13 | End: 2024-08-13 | Stop reason: HOSPADM

## 2024-08-13 RX ORDER — LORAZEPAM 2 MG/ML
3 INJECTION INTRAMUSCULAR
Status: DISCONTINUED | OUTPATIENT
Start: 2024-08-13 | End: 2024-08-13 | Stop reason: HOSPADM

## 2024-08-13 RX ORDER — LORAZEPAM 1 MG/1
2 TABLET ORAL
Status: DISCONTINUED | OUTPATIENT
Start: 2024-08-13 | End: 2024-08-13 | Stop reason: HOSPADM

## 2024-08-13 RX ORDER — LORAZEPAM 1 MG/1
4 TABLET ORAL
Status: DISCONTINUED | OUTPATIENT
Start: 2024-08-13 | End: 2024-08-13 | Stop reason: HOSPADM

## 2024-08-13 RX ADMIN — FOLIC ACID 1 MG: 1 TABLET ORAL at 12:20

## 2024-08-13 RX ADMIN — Medication 100 MG: at 12:20

## 2024-08-13 RX ADMIN — MAGNESIUM SULFATE HEPTAHYDRATE 2000 MG: 40 INJECTION, SOLUTION INTRAVENOUS at 13:12

## 2024-08-13 RX ADMIN — POTASSIUM CHLORIDE 20 MEQ: 1500 TABLET, EXTENDED RELEASE ORAL at 13:06

## 2024-08-13 NOTE — CARE COORDINATION
Social Work/Transition of Care:    SW consulted to see pt in order to discuss substance abuse resources, pt currently in radiology.    Electronically signed by RIMMA muniz on 8/13/2024 at 11:52 AM     SW met with pt introduced self and role, Pt declines the need for assistance with substance abuse, pt reports she really doesn't drink a lot, the issue is she had gastric sleeve surgery and since the operation alchohol affects her differently and she needs to be careful.  Pt stated she only came in to the ED today her fingers were tingling and she felt like she was going to have a stroke.  Pt stated she feels like that when she gets dehydrated and hydration is also a issue with the gastric sleeve, pt states she normally goes to [facility] every two weeks but missed last week.  Pt reports she is already feeling better and continues to decline the need for resources, ED PCP notified.    ..Electronically signed by RIMMA muniz on 8/13/2024 at 2:18 PM

## 2024-08-13 NOTE — ED PROVIDER NOTES
Department of Emergency Medicine     Written by: Miky Youngblood MD  Patient Name: Caterina Oliva  Admit Date: 2024 10:58 AM  MRN: [id number]                   : 1975      ------------------------- CC-------------------------  Chief Complaint   Patient presents with    hand cramping     Per patient spent Friday/Saturday drinking (states not a daily drinker) did not eat or drink since. Per patient hands began cramping u     ------------------------- HPI -------------------------    Caterina Oliva is a 49 y.o. female who presents to the emergency department today complaining of tingling and cramping on both of her arms.  She indicates she is having a seizure and does not feel well. She indicates she was drinking Friday and Saturday, with last drink on Saturday. She reports generalized pain including the chest and continues to go in and out of sleep. She has not eaten since Saturday and could not remember if she fell. She denies fever, chills, headaches, shortness of breath, abdominal pain, nausea/vomiting, hematochezia, melena.     Nursing notes were all reviewed and agreed with or any disagreements were addressed in the HPI.    REVIEW OF SYSTEMS:    Pertinent positives and negatives mentioned in the HPI/MDM.     ------------------------- PAST HISTORY -------------------------    I personally reviewed the following history:    Past Medical History:  has a past medical history of Cerebral artery occlusion with cerebral infarction (HCC), Diverticulosis, GERD without esophagitis, Hyperlipidemia, Hypertension, Irritable bowel syndrome, Knee pain, Morbid obesity due to excess calories (HCC), Obesity, and Sleep apnea.    Past Surgical History:  has a past surgical history that includes Hysterectomy; Colonoscopy (); Colonoscopy (N/A, 2019); Colonoscopy (2019); joint replacement (Right); and Cholecystectomy, laparoscopic (N/A, 2024).    Social History:  reports that she has never smoked.  electrolyte abnormalities, fall,     On presentation, patient does not appear ill, septic or in acute distress. She appears fatigued but sleeping comfortably and can be awaken to answer questions. She is able to protect her airways and not at risk of aspiration. Currently, no evidence of acute intoxication.     CBC unremarkable. CBC showed potassium of 3.2, which is down from 3.8, one month prior. Mag at 1.4. Patient supplemented with magnesium sulfate and Klor-con.     Imaging as read by radiologist showed acute intracranial abnormality on head CT, no acute process on x-ray of the pelvis and chest.    Patient was observed in the ED and on reassessment, patient reported no symptoms.  Patient was given prescription for Klor-Con 10 and instructed to follow-up with her PCP.  Patient demonstrated understanding and remained hemodynamic stable throughout ED course.      Is this patient to be included in the SEP-1 core measure? No Exclusion criteria - the patient is NOT to be included for SEP-1 Core Measure due to: Infection is not suspected    Please see ED Course below for further MDM     Medications administered today:  Medications   magnesium sulfate 2000 mg in 50 mL IVPB premix (0 mg IntraVENous Stopped 8/13/24 1524)   potassium chloride (KLOR-CON M) extended release tablet 20 mEq (20 mEq Oral Given 8/13/24 1306)       CONSULTS: discussion with bolded \"IP consult\", otherwise consult was likely placed by admitting service  IP CONSULT TO SOCIAL WORK    PROCEDURES: Unless otherwise listed below, none    SOCIAL DETERMINANTS: None    ------------------------- ADDITIONAL PROVIDER NOTES ---------------------------    I have spoken to the patient and/or family regarding results and the proposed plan for disposition.  They are comfortable with management and treatment plans as discussed.    -------------------------------- IMPRESSION AND DISPOSITION ---------------------------------    IMPRESSION  1. Hypomagnesemia    2.

## 2024-08-16 ENCOUNTER — OFFICE VISIT (OUTPATIENT)
Dept: PRIMARY CARE CLINIC | Age: 49
End: 2024-08-16

## 2024-08-16 VITALS
SYSTOLIC BLOOD PRESSURE: 130 MMHG | DIASTOLIC BLOOD PRESSURE: 70 MMHG | BODY MASS INDEX: 37.44 KG/M2 | HEART RATE: 85 BPM | HEIGHT: 68 IN | OXYGEN SATURATION: 98 % | RESPIRATION RATE: 16 BRPM | WEIGHT: 247 LBS | TEMPERATURE: 97.6 F

## 2024-08-16 DIAGNOSIS — E87.6 HYPOKALEMIA: Primary | ICD-10-CM

## 2024-08-16 DIAGNOSIS — F32.A ANXIETY AND DEPRESSION: ICD-10-CM

## 2024-08-16 DIAGNOSIS — Z78.9 ALCOHOL USE: ICD-10-CM

## 2024-08-16 DIAGNOSIS — F41.9 ANXIETY AND DEPRESSION: ICD-10-CM

## 2024-08-16 DIAGNOSIS — E83.42 HYPOMAGNESEMIA: ICD-10-CM

## 2024-08-16 LAB
ALBUMIN: 4.3 G/DL (ref 3.5–5.2)
ALP BLD-CCNC: 74 U/L (ref 35–104)
ALT SERPL-CCNC: 36 U/L (ref 0–32)
ANION GAP SERPL CALCULATED.3IONS-SCNC: 12 MMOL/L (ref 7–16)
AST SERPL-CCNC: 38 U/L (ref 0–31)
BILIRUB SERPL-MCNC: 1.7 MG/DL (ref 0–1.2)
BUN BLDV-MCNC: 22 MG/DL (ref 6–20)
CALCIUM SERPL-MCNC: 9.2 MG/DL (ref 8.6–10.2)
CHLORIDE BLD-SCNC: 103 MMOL/L (ref 98–107)
CO2: 26 MMOL/L (ref 22–29)
CREAT SERPL-MCNC: 0.8 MG/DL (ref 0.5–1)
GFR, ESTIMATED: >90 ML/MIN/1.73M2
GLUCOSE BLD-MCNC: 120 MG/DL (ref 74–99)
MAGNESIUM: 1.9 MG/DL (ref 1.6–2.6)
POTASSIUM SERPL-SCNC: 4.2 MMOL/L (ref 3.5–5)
SODIUM BLD-SCNC: 141 MMOL/L (ref 132–146)
TOTAL PROTEIN: 7.1 G/DL (ref 6.4–8.3)

## 2024-08-16 PROCEDURE — 99214 OFFICE O/P EST MOD 30 MIN: CPT | Performed by: INTERNAL MEDICINE

## 2024-08-16 PROCEDURE — 3078F DIAST BP <80 MM HG: CPT | Performed by: INTERNAL MEDICINE

## 2024-08-16 PROCEDURE — 36415 COLL VENOUS BLD VENIPUNCTURE: CPT | Performed by: INTERNAL MEDICINE

## 2024-08-16 PROCEDURE — 3075F SYST BP GE 130 - 139MM HG: CPT | Performed by: INTERNAL MEDICINE

## 2024-08-16 RX ORDER — MAGNESIUM 30 MG
30 TABLET ORAL 2 TIMES DAILY
Qty: 60 TABLET | Refills: 0 | Status: SHIPPED | OUTPATIENT
Start: 2024-08-16

## 2024-08-16 ASSESSMENT — ENCOUNTER SYMPTOMS
COUGH: 0
NAUSEA: 0
SHORTNESS OF BREATH: 0
ABDOMINAL PAIN: 0
DIARRHEA: 0

## 2024-08-16 NOTE — PROGRESS NOTES
release tablet Take 1 tablet by mouth 2 times daily (Patient not taking: Reported on 8/16/2024) 60 tablet 0    [DISCONTINUED] magnesium 30 MG tablet Take 1 tablet by mouth 2 times daily (Patient not taking: Reported on 8/16/2024) 30 tablet 3    [DISCONTINUED] sertraline (ZOLOFT) 50 MG tablet Take 1 tablet by mouth daily 30 tablet 0     No facility-administered encounter medications on file as of 8/16/2024.       Return in about 4 weeks (around 9/13/2024).        Reviewed recent labs related to Caterina's current problems      Discussed importance of regular Health Maintenance follow up  Health Maintenance   Topic    HIV screen     Hepatitis C screen     Hepatitis B vaccine (1 of 3 - 19+ 3-dose series)    Lipids     Breast cancer screen     Shingles vaccine (1 of 2)    COVID-19 Vaccine (3 - 2023-24 season)    Flu vaccine (1)    DTaP/Tdap/Td vaccine (2 - Td or Tdap)    Depression Monitoring     Diabetes screen     Colorectal Cancer Screen     Hepatitis A vaccine     Hib vaccine     Polio vaccine     Meningococcal (ACWY) vaccine     Pneumococcal 0-64 years Vaccine     Depression Screen

## 2024-09-13 ENCOUNTER — TELEPHONE (OUTPATIENT)
Dept: PRIMARY CARE CLINIC | Age: 49
End: 2024-09-13

## 2024-10-02 ENCOUNTER — TELEPHONE (OUTPATIENT)
Dept: PRIMARY CARE CLINIC | Age: 49
End: 2024-10-02

## 2024-10-02 NOTE — TELEPHONE ENCOUNTER
Patient requesting a refill of her  sertraline (ZOLOFT) 50 MG tablet   Please send to   [id number] - [place], [place] - [address] [place] [address] -   Thank you.

## 2024-10-03 NOTE — TELEPHONE ENCOUNTER
Patient called in asking for help. She stated Dr. Henok Cruz has told her that she needs to stop drinking and she understands that and she is trying but she's having a really hard time with the cravings. She said nothing she is doing is helping. She has gone to AA meetings in the past and she said she does not feel like that is for her. She stated that about 2-3 years ago the provider she was seeing prescribed her a medication that helped ease the cravings and is asking if Dr. Henok Cruz can prescribe that for her again?     Patient would like a call back with information or any other suggests.  [phone]

## 2024-10-07 NOTE — TELEPHONE ENCOUNTER
I called the patient again to schedule an appointment. She said she is unable to come in due to not having insurance. Finacially she is unable to continue to come in just to discuss the options. She stated she is attending meetings in the meantime to help a little bit but she was hoping Dr. Henok Cruz could prescribe that medication to ease the symptoms.     She wants Dr. Henok Cruz to be aware that its not that she does not want to come in. Financially she is not able to come in to the office.     JOANNE

## 2024-10-08 RX ORDER — ACAMPROSATE CALCIUM 333 MG/1
666 TABLET, DELAYED RELEASE ORAL 3 TIMES DAILY
Qty: 90 TABLET | Refills: 0 | Status: SHIPPED | OUTPATIENT
Start: 2024-10-08

## 2025-01-22 NOTE — TELEPHONE ENCOUNTER
Patient requesting a refill of her      sertraline (ZOLOFT) 50 MG tablet     acamprosate (CAMPRAL) 333 MG tablet   Please send to   [id number] - [place], [place] - [address] [place] [address]    Patient is on 4th day of stomach flu and is wanting to know if there is something that can be sent in symptoms of nausea , diarrhea and vomiting. Thank you.

## 2025-01-22 NOTE — TELEPHONE ENCOUNTER
Name of Medication(s) Requested:  Requested Prescriptions     Pending Prescriptions Disp Refills    sertraline (ZOLOFT) 50 MG tablet 30 tablet 0     Sig: Take 1 tablet by mouth daily    acamprosate (CAMPRAL) 333 MG tablet 90 tablet 0     Sig: Take 2 tablets by mouth 3 times daily       Medication is on current medication list Yes    Dosage and directions were verified? Yes    Quantity verified: 30 day supply     Pharmacy Verified?  Yes    Last Appointment:  8/16/2024    Future appts:  No future appointments.     (If no appt send self scheduling link. .REFILLAPPT)  Scheduling request sent?     [x] Yes  [] No    Does patient need updated?  [x] Yes  [] No

## 2025-01-24 RX ORDER — ACAMPROSATE CALCIUM 333 MG/1
666 TABLET, DELAYED RELEASE ORAL 3 TIMES DAILY
Qty: 90 TABLET | Refills: 0 | OUTPATIENT
Start: 2025-01-24

## 2025-01-24 NOTE — TELEPHONE ENCOUNTER
Patient called in to schedule an appointment and to request refill on sertraline (ZOLOFT) 50 MG tablet . She would like something sent in today because she has already been without this medication for 2 days.     Next appointment is 1.28.25

## 2025-01-24 NOTE — TELEPHONE ENCOUNTER
Name of Medication(s) Requested:  Requested Prescriptions     Pending Prescriptions Disp Refills    sertraline (ZOLOFT) 50 MG tablet 30 tablet 0     Sig: Take 1 tablet by mouth daily       Medication is on current medication list Yes    Dosage and directions were verified? Yes    Quantity verified: 30 day supply     Pharmacy Verified?  Yes    Last Appointment:  8/16/2024    Future appts:  Future Appointments   Date Time Provider Department Center   1/28/2025  8:30 AM Milagro Lentz MD [facility] ECC DEP        (If no appt send self scheduling link. .REFILLAPPT)  Scheduling request sent?     [] Yes  [x] No    Does patient need updated?  [] Yes  [x] No

## 2025-01-28 ENCOUNTER — OFFICE VISIT (OUTPATIENT)
Dept: PRIMARY CARE CLINIC | Age: 50
End: 2025-01-28
Payer: COMMERCIAL

## 2025-01-28 VITALS
HEIGHT: 68 IN | RESPIRATION RATE: 16 BRPM | SYSTOLIC BLOOD PRESSURE: 118 MMHG | BODY MASS INDEX: 38.34 KG/M2 | DIASTOLIC BLOOD PRESSURE: 72 MMHG | WEIGHT: 253 LBS | OXYGEN SATURATION: 98 % | HEART RATE: 88 BPM | TEMPERATURE: 97.6 F

## 2025-01-28 DIAGNOSIS — E66.812 CLASS 2 OBESITY DUE TO EXCESS CALORIES WITHOUT SERIOUS COMORBIDITY WITH BODY MASS INDEX (BMI) OF 37.0 TO 37.9 IN ADULT: ICD-10-CM

## 2025-01-28 DIAGNOSIS — E66.09 CLASS 2 OBESITY DUE TO EXCESS CALORIES WITHOUT SERIOUS COMORBIDITY WITH BODY MASS INDEX (BMI) OF 37.0 TO 37.9 IN ADULT: ICD-10-CM

## 2025-01-28 DIAGNOSIS — E83.42 HYPOMAGNESEMIA: ICD-10-CM

## 2025-01-28 DIAGNOSIS — Z78.9 ALCOHOL USE: ICD-10-CM

## 2025-01-28 DIAGNOSIS — Z90.49 HX OF CHOLECYSTECTOMY: ICD-10-CM

## 2025-01-28 DIAGNOSIS — F32.A ANXIETY AND DEPRESSION: Primary | ICD-10-CM

## 2025-01-28 DIAGNOSIS — E74.39 GLUCOSE INTOLERANCE: ICD-10-CM

## 2025-01-28 DIAGNOSIS — R79.89 LFTS ABNORMAL: ICD-10-CM

## 2025-01-28 DIAGNOSIS — K21.9 GASTROESOPHAGEAL REFLUX DISEASE WITHOUT ESOPHAGITIS: ICD-10-CM

## 2025-01-28 DIAGNOSIS — F41.9 ANXIETY AND DEPRESSION: Primary | ICD-10-CM

## 2025-01-28 LAB
ALBUMIN: 4.1 G/DL (ref 3.5–5.2)
ALP BLD-CCNC: 61 U/L (ref 35–104)
ALT SERPL-CCNC: 13 U/L (ref 0–32)
ANION GAP SERPL CALCULATED.3IONS-SCNC: 12 MMOL/L (ref 7–16)
AST SERPL-CCNC: 17 U/L (ref 0–31)
BILIRUB SERPL-MCNC: 0.5 MG/DL (ref 0–1.2)
BUN BLDV-MCNC: 16 MG/DL (ref 6–20)
CALCIUM SERPL-MCNC: 9 MG/DL (ref 8.6–10.2)
CHLORIDE BLD-SCNC: 103 MMOL/L (ref 98–107)
CHOLESTEROL, TOTAL: 208 MG/DL
CO2: 24 MMOL/L (ref 22–29)
CREAT SERPL-MCNC: 0.7 MG/DL (ref 0.5–1)
GFR, ESTIMATED: >90 ML/MIN/1.73M2
GLUCOSE BLD-MCNC: 83 MG/DL (ref 74–99)
HCT VFR BLD CALC: 38.6 % (ref 34–48)
HDLC SERPL-MCNC: 58 MG/DL
HEMOGLOBIN: 13 G/DL (ref 11.5–15.5)
LDL CHOLESTEROL: 129 MG/DL
MAGNESIUM: 1.9 MG/DL (ref 1.6–2.6)
MCH RBC QN AUTO: 32.3 PG (ref 26–35)
MCHC RBC AUTO-ENTMCNC: 33.7 G/DL (ref 32–34.5)
MCV RBC AUTO: 95.8 FL (ref 80–99.9)
PDW BLD-RTO: 12.2 % (ref 11.5–15)
PLATELET # BLD: 226 K/UL (ref 130–450)
PMV BLD AUTO: 11.1 FL (ref 7–12)
POTASSIUM SERPL-SCNC: 4.1 MMOL/L (ref 3.5–5)
RBC # BLD: 4.03 M/UL (ref 3.5–5.5)
SODIUM BLD-SCNC: 139 MMOL/L (ref 132–146)
TOTAL PROTEIN: 6.3 G/DL (ref 6.4–8.3)
TRIGL SERPL-MCNC: 105 MG/DL
VLDLC SERPL CALC-MCNC: 21 MG/DL
WBC # BLD: 5.1 K/UL (ref 4.5–11.5)

## 2025-01-28 PROCEDURE — 36415 COLL VENOUS BLD VENIPUNCTURE: CPT | Performed by: INTERNAL MEDICINE

## 2025-01-28 PROCEDURE — 3078F DIAST BP <80 MM HG: CPT | Performed by: INTERNAL MEDICINE

## 2025-01-28 PROCEDURE — 99214 OFFICE O/P EST MOD 30 MIN: CPT | Performed by: INTERNAL MEDICINE

## 2025-01-28 PROCEDURE — 3074F SYST BP LT 130 MM HG: CPT | Performed by: INTERNAL MEDICINE

## 2025-01-28 ASSESSMENT — ENCOUNTER SYMPTOMS
COUGH: 0
ABDOMINAL PAIN: 0
NAUSEA: 0
VOMITING: 0
DIARRHEA: 0
SHORTNESS OF BREATH: 0

## 2025-01-28 NOTE — PROGRESS NOTES
Patient is unsure of her last mammogram doesn't want one   
wine per week     Comment: socially       OBJECTIVE  /72   Pulse 88   Temp 97.6 °F (36.4 °C)   Resp 16   Ht 1.727 m (5' 8\")   Wt 114.8 kg (253 lb)   LMP 04/01/2008   SpO2 98%   BMI 38.47 kg/m²     Problem List:  Caterina does not have any pertinent problems on file.    PHYS EX:  Physical Exam  Eyes:      Extraocular Movements: Extraocular movements intact.      Pupils: Pupils are equal, round, and reactive to light.   Neck:      Thyroid: No thyromegaly.      Vascular: No carotid bruit or JVD.   Cardiovascular:      Heart sounds: No murmur heard.     No gallop.   Pulmonary:      Effort: Pulmonary effort is normal.      Breath sounds: Normal breath sounds.   Abdominal:      Palpations: There is no hepatomegaly or splenomegaly.      Tenderness: There is no abdominal tenderness.   Skin:     Coloration: Skin is not cyanotic.      Findings: No bruising or rash.      Nails: There is no clubbing.   Neurological:      General: No focal deficit present.       Extremities: Pedal pulses No Edema     ASSESSMENT/PLAN  Caterina was seen today for medication refill.    Diagnoses and all orders for this visit:    Anxiety and depression stable continue zoloft     Alcohol use been limiting intake advised to quit     Class 2 obesity due to excess calories without serious comorbidity with body mass index (BMI) of 37.0 to 37.9 in adult Advised patient to watch diet, increase activity, and goal to lose weight.     Glucose intolerance to recheck     Hypomagnesemia will recheck     Hx of cholecystectomy    LFTs abnormal to recheck     GERD  omeprazole 20 mg qd     Other orders  -     sertraline (ZOLOFT) 50 MG tablet; Take 1 tablet by mouth daily        Outpatient Encounter Medications as of 1/28/2025   Medication Sig Dispense Refill    sertraline (ZOLOFT) 50 MG tablet Take 1 tablet by mouth daily 90 tablet 0    [DISCONTINUED] sertraline (ZOLOFT) 50 MG tablet Take 1 tablet by mouth daily 30 tablet 0    [DISCONTINUED] acamprosate

## 2025-03-05 NOTE — TELEPHONE ENCOUNTER
Patient requesting a refill of her  States she is completely out and needs it so she doesn't gp through withdraws    sertraline (ZOLOFT) 50 MG tablet    [id number] - [place], [place] - [address] [place] [address]    She would like a call back at [phone] to let her know if it is called in today. Thank you.

## 2025-03-05 NOTE — TELEPHONE ENCOUNTER
Name of Medication(s) Requested:  Requested Prescriptions     Pending Prescriptions Disp Refills    sertraline (ZOLOFT) 50 MG tablet 90 tablet 0     Sig: Take 1 tablet by mouth daily       Medication is on current medication list Yes    Dosage and directions were verified? Yes    Quantity verified: 90 day supply     Pharmacy Verified?  Yes    Last Appointment:  1/28/2025    Future appts:  Future Appointments   Date Time Provider Department Center   4/28/2025  9:15 AM Milagro Lentz MD [facility] ECC DEP        (If no appt send self scheduling link. .REFILLAPPT)  Scheduling request sent?     [] Yes  [x] No    Does patient need updated?  [] Yes  [x] No

## 2025-03-08 ENCOUNTER — HOSPITAL ENCOUNTER (INPATIENT)
Age: 50
LOS: 4 days | Discharge: HOME | DRG: 753 | End: 2025-03-13
Attending: STUDENT IN AN ORGANIZED HEALTH CARE EDUCATION/TRAINING PROGRAM | Admitting: PSYCHIATRY & NEUROLOGY
Payer: COMMERCIAL

## 2025-03-08 DIAGNOSIS — R45.851 SUICIDE IDEATION: ICD-10-CM

## 2025-03-08 DIAGNOSIS — F10.920 ACUTE ALCOHOLIC INTOXICATION WITHOUT COMPLICATION: Primary | ICD-10-CM

## 2025-03-08 DIAGNOSIS — Z00.8 EVALUATION BY PSYCHIATRIC SERVICE REQUIRED: ICD-10-CM

## 2025-03-08 LAB
ALBUMIN SERPL-MCNC: 4.2 G/DL (ref 3.5–5.2)
ALP SERPL-CCNC: 74 U/L (ref 35–104)
ALT SERPL-CCNC: 30 U/L (ref 0–32)
AMPHET UR QL SCN: NEGATIVE
ANION GAP SERPL CALCULATED.3IONS-SCNC: 17 MMOL/L (ref 7–16)
APAP SERPL-MCNC: 6 UG/ML (ref 10–30)
AST SERPL-CCNC: 39 U/L (ref 0–31)
B.E.: -3.1 MMOL/L (ref -3–3)
BARBITURATES UR QL SCN: NEGATIVE
BASOPHILS # BLD: 0.08 K/UL (ref 0–0.2)
BASOPHILS NFR BLD: 1 % (ref 0–2)
BENZODIAZ UR QL: NEGATIVE
BILIRUB SERPL-MCNC: 0.5 MG/DL (ref 0–1.2)
BUN SERPL-MCNC: 9 MG/DL (ref 6–20)
BUPRENORPHINE UR QL: NEGATIVE
CALCIUM SERPL-MCNC: 8.9 MG/DL (ref 8.6–10.2)
CANNABINOIDS UR QL SCN: NEGATIVE
CHLORIDE SERPL-SCNC: 104 MMOL/L (ref 98–107)
CO2 SERPL-SCNC: 19 MMOL/L (ref 22–29)
COCAINE UR QL SCN: NEGATIVE
COHB: 1.4 % (ref 0–1.5)
CREAT SERPL-MCNC: 0.6 MG/DL (ref 0.5–1)
CRITICAL: ABNORMAL
DATE ANALYZED: ABNORMAL
DATE OF COLLECTION: ABNORMAL
EOSINOPHIL # BLD: 0.04 K/UL (ref 0.05–0.5)
EOSINOPHILS RELATIVE PERCENT: 1 % (ref 0–6)
ERYTHROCYTE [DISTWIDTH] IN BLOOD BY AUTOMATED COUNT: 11.8 % (ref 11.5–15)
ETHANOLAMINE SERPL-MCNC: 133 MG/DL (ref 0–0.08)
ETHANOLAMINE SERPL-MCNC: 188 MG/DL (ref 0–0.08)
ETHANOLAMINE SERPL-MCNC: 261 MG/DL (ref 0–0.08)
FENTANYL UR QL: NEGATIVE
GFR, ESTIMATED: >90 ML/MIN/1.73M2
GLUCOSE SERPL-MCNC: 91 MG/DL (ref 74–99)
HCO3: 20.3 MMOL/L (ref 22–26)
HCT VFR BLD AUTO: 39.6 % (ref 34–48)
HGB BLD-MCNC: 14.3 G/DL (ref 11.5–15.5)
HHB: 3.9 % (ref 0–5)
IMM GRANULOCYTES # BLD AUTO: <0.03 K/UL (ref 0–0.58)
IMM GRANULOCYTES NFR BLD: 0 % (ref 0–5)
LAB: ABNORMAL
LYMPHOCYTES NFR BLD: 2.46 K/UL (ref 1.5–4)
LYMPHOCYTES RELATIVE PERCENT: 38 % (ref 20–42)
Lab: 1615
MAGNESIUM SERPL-MCNC: 2 MG/DL (ref 1.6–2.6)
MCH RBC QN AUTO: 32.2 PG (ref 26–35)
MCHC RBC AUTO-ENTMCNC: 36.1 G/DL (ref 32–34.5)
MCV RBC AUTO: 89.2 FL (ref 80–99.9)
METHADONE UR QL: NEGATIVE
METHB: 0.9 % (ref 0–1.5)
MODE: ABNORMAL
MONOCYTES NFR BLD: 0.39 K/UL (ref 0.1–0.95)
MONOCYTES NFR BLD: 6 % (ref 2–12)
NEUTROPHILS NFR BLD: 54 % (ref 43–80)
NEUTS SEG NFR BLD: 3.43 K/UL (ref 1.8–7.3)
O2 SATURATION: 96 % (ref 92–98.5)
O2HB: 93.8 % (ref 94–97)
OPERATOR ID: 421
OPIATES UR QL SCN: NEGATIVE
OXYCODONE UR QL SCN: NEGATIVE
PATIENT TEMP: 37 C
PCO2: 32.2 MMHG (ref 35–45)
PCP UR QL SCN: NEGATIVE
PH BLOOD GAS: 7.42 (ref 7.35–7.45)
PLATELET # BLD AUTO: 219 K/UL (ref 130–450)
PMV BLD AUTO: 9.9 FL (ref 7–12)
PO2: 88 MMHG (ref 75–100)
POTASSIUM SERPL-SCNC: 3.5 MMOL/L (ref 3.5–5)
PROT SERPL-MCNC: 6.9 G/DL (ref 6.4–8.3)
RBC # BLD AUTO: 4.44 M/UL (ref 3.5–5.5)
SALICYLATES SERPL-MCNC: <0.3 MG/DL (ref 0–30)
SODIUM SERPL-SCNC: 140 MMOL/L (ref 132–146)
SOURCE, BLOOD GAS: ABNORMAL
TEST INFORMATION: NORMAL
THB: 15.6 G/DL (ref 11.5–16.5)
TIME ANALYZED: 1629
TOXIC TRICYCLIC SC,BLOOD: NEGATIVE
WBC OTHER # BLD: 6.4 K/UL (ref 4.5–11.5)

## 2025-03-08 PROCEDURE — 80053 COMPREHEN METABOLIC PANEL: CPT

## 2025-03-08 PROCEDURE — 83735 ASSAY OF MAGNESIUM: CPT

## 2025-03-08 PROCEDURE — 36415 COLL VENOUS BLD VENIPUNCTURE: CPT

## 2025-03-08 PROCEDURE — 82805 BLOOD GASES W/O2 SATURATION: CPT

## 2025-03-08 PROCEDURE — 85025 COMPLETE CBC W/AUTO DIFF WBC: CPT

## 2025-03-08 PROCEDURE — 93005 ELECTROCARDIOGRAM TRACING: CPT | Performed by: STUDENT IN AN ORGANIZED HEALTH CARE EDUCATION/TRAINING PROGRAM

## 2025-03-08 PROCEDURE — 6370000000 HC RX 637 (ALT 250 FOR IP): Performed by: STUDENT IN AN ORGANIZED HEALTH CARE EDUCATION/TRAINING PROGRAM

## 2025-03-08 PROCEDURE — 80143 DRUG ASSAY ACETAMINOPHEN: CPT

## 2025-03-08 PROCEDURE — 80179 DRUG ASSAY SALICYLATE: CPT

## 2025-03-08 PROCEDURE — 80307 DRUG TEST PRSMV CHEM ANLYZR: CPT

## 2025-03-08 PROCEDURE — 99285 EMERGENCY DEPT VISIT HI MDM: CPT

## 2025-03-08 PROCEDURE — G0480 DRUG TEST DEF 1-7 CLASSES: HCPCS

## 2025-03-08 RX ORDER — LORAZEPAM 1 MG/1
3 TABLET ORAL
Status: DISCONTINUED | OUTPATIENT
Start: 2025-03-08 | End: 2025-03-09

## 2025-03-08 RX ORDER — LORAZEPAM 2 MG/ML
3 INJECTION INTRAMUSCULAR
Status: DISCONTINUED | OUTPATIENT
Start: 2025-03-08 | End: 2025-03-09

## 2025-03-08 RX ORDER — SODIUM CHLORIDE 9 MG/ML
INJECTION, SOLUTION INTRAVENOUS PRN
Status: DISCONTINUED | OUTPATIENT
Start: 2025-03-08 | End: 2025-03-13 | Stop reason: HOSPADM

## 2025-03-08 RX ORDER — LORAZEPAM 1 MG/1
2 TABLET ORAL
Status: DISCONTINUED | OUTPATIENT
Start: 2025-03-08 | End: 2025-03-09

## 2025-03-08 RX ORDER — LORAZEPAM 1 MG/1
1 TABLET ORAL
Status: DISCONTINUED | OUTPATIENT
Start: 2025-03-08 | End: 2025-03-09

## 2025-03-08 RX ORDER — ONDANSETRON 4 MG/1
4 TABLET, ORALLY DISINTEGRATING ORAL ONCE
Status: COMPLETED | OUTPATIENT
Start: 2025-03-08 | End: 2025-03-08

## 2025-03-08 RX ORDER — FOLIC ACID 1 MG/1
1 TABLET ORAL ONCE
Status: COMPLETED | OUTPATIENT
Start: 2025-03-08 | End: 2025-03-08

## 2025-03-08 RX ORDER — LORAZEPAM 1 MG/1
4 TABLET ORAL
Status: DISCONTINUED | OUTPATIENT
Start: 2025-03-08 | End: 2025-03-09

## 2025-03-08 RX ORDER — LORAZEPAM 2 MG/ML
2 INJECTION INTRAMUSCULAR
Status: DISCONTINUED | OUTPATIENT
Start: 2025-03-08 | End: 2025-03-09

## 2025-03-08 RX ORDER — SODIUM CHLORIDE 0.9 % (FLUSH) 0.9 %
5-40 SYRINGE (ML) INJECTION EVERY 12 HOURS SCHEDULED
Status: DISCONTINUED | OUTPATIENT
Start: 2025-03-08 | End: 2025-03-09

## 2025-03-08 RX ORDER — MULTIVITAMIN WITH IRON
1 TABLET ORAL ONCE
Status: COMPLETED | OUTPATIENT
Start: 2025-03-08 | End: 2025-03-08

## 2025-03-08 RX ORDER — LORAZEPAM 2 MG/ML
1 INJECTION INTRAMUSCULAR
Status: DISCONTINUED | OUTPATIENT
Start: 2025-03-08 | End: 2025-03-09

## 2025-03-08 RX ORDER — ACETAMINOPHEN 500 MG
1000 TABLET ORAL ONCE
Status: COMPLETED | OUTPATIENT
Start: 2025-03-08 | End: 2025-03-08

## 2025-03-08 RX ORDER — LORAZEPAM 2 MG/ML
4 INJECTION INTRAMUSCULAR
Status: DISCONTINUED | OUTPATIENT
Start: 2025-03-08 | End: 2025-03-09

## 2025-03-08 RX ORDER — SODIUM CHLORIDE 0.9 % (FLUSH) 0.9 %
5-40 SYRINGE (ML) INJECTION PRN
Status: DISCONTINUED | OUTPATIENT
Start: 2025-03-08 | End: 2025-03-13 | Stop reason: HOSPADM

## 2025-03-08 RX ORDER — LORAZEPAM 1 MG/1
1 TABLET ORAL ONCE
Status: COMPLETED | OUTPATIENT
Start: 2025-03-08 | End: 2025-03-08

## 2025-03-08 RX ORDER — LANOLIN ALCOHOL/MO/W.PET/CERES
100 CREAM (GRAM) TOPICAL ONCE
Status: COMPLETED | OUTPATIENT
Start: 2025-03-08 | End: 2025-03-08

## 2025-03-08 RX ADMIN — ACETAMINOPHEN 1000 MG: 500 TABLET ORAL at 19:42

## 2025-03-08 RX ADMIN — LORAZEPAM 1 MG: 1 TABLET ORAL at 17:24

## 2025-03-08 RX ADMIN — Medication 100 MG: at 17:24

## 2025-03-08 RX ADMIN — FOLIC ACID 1 MG: 1 TABLET ORAL at 17:24

## 2025-03-08 RX ADMIN — MULTIVITAMIN TABLET 1 TABLET: TABLET at 17:24

## 2025-03-08 RX ADMIN — ONDANSETRON 4 MG: 4 TABLET, ORALLY DISINTEGRATING ORAL at 18:00

## 2025-03-08 ASSESSMENT — LIFESTYLE VARIABLES
HOW OFTEN DO YOU HAVE A DRINK CONTAINING ALCOHOL: 2-3 TIMES A WEEK
HOW MANY STANDARD DRINKS CONTAINING ALCOHOL DO YOU HAVE ON A TYPICAL DAY: 10 OR MORE

## 2025-03-08 ASSESSMENT — PAIN - FUNCTIONAL ASSESSMENT: PAIN_FUNCTIONAL_ASSESSMENT: NONE - DENIES PAIN

## 2025-03-08 NOTE — ED TRIAGE NOTES
Patient arrived on the unit, crying, labile. Feeling guilty. Says she has  has a great  life Can't  understand why she did this.

## 2025-03-08 NOTE — ED PROVIDER NOTES
[facility] EMERGENCY DEPARTMENT  EMERGENCY DEPARTMENT ENCOUNTER        Pt Name: Caterina Oliva  MRN: [id number]  Birthdate 1975  Date of evaluation: 3/8/2025  Provider: Nati Henderson MD  PCP: Milagro Lentz MD  Note Started: 3:24 PM EST 3/8/25    HPI  49 y.o. female presenting for mental health problem.  Patient pink slipped prior to arrival.  Per pink slip, patient sent alarming text messages to friends and was found in garage with the car running.  Patient tearful on evaluation, states she has been drinking the past couple days.  She is drank 4 bottles of wine lactic was this morning.  She states she is always sad and has been that way since high school.  She states she gets sad when she drinks alcohol.  She denies HI.  Denies other drug use.  She is not currently seen by counselor and not on any psychiatric medications.      --------------------------------------------- PAST HISTORY ---------------------------------------------  Past Medical History:  has a past medical history of Cerebral artery occlusion with cerebral infarction (HCC), Diverticulosis, GERD without esophagitis, Irritable bowel syndrome, Knee pain, Morbid obesity due to excess calories, Obesity, and Sleep apnea.    Past Surgical History:  has a past surgical history that includes Hysterectomy; Colonoscopy (2013); Colonoscopy (N/A, 01/25/2019); Colonoscopy (11/04/2019); joint replacement (Right); and Cholecystectomy, laparoscopic (N/A, 5/12/2024).    Social History:  reports that she has never smoked. She has never used smokeless tobacco. She reports current alcohol use of about 1.0 standard drink of alcohol per week. She reports that she does not use drugs.    Family History: family history includes Heart Attack in her mother.     The patient’s home medications have been reviewed.    Allergies: No known allergies      Review of Systems   Psychiatric/Behavioral:  Positive for suicidal ideas.    All other

## 2025-03-08 NOTE — VIRTUAL HEALTH
Reason for Cancel: TelePsych Reason for Cancel: Patient impaired    Per bedside nurse patient intoxicated; states she has consumed large amounts of alcohol.  Advised will reconsult when patient JESICA at legal limit   -------------------------    Caterina Oliva, was evaluated through a synchronous (real-time) audio-video encounter. The patient (and/or guardian if applicable) is aware that this is a billable service, which includes applicable co-pays. This virtual visit was conducted with patient's (and/or legal guardian's) consent. Patient identification was verified, and a caregiver was present when appropriate.  The patient was located at Facility (Appt Department): [facility] EMERGENCY DEPARTMENT  [facility] [place] [id number]  Loc: [phone]  The provider was located at Home (City/State): [facility]   Confirm you are appropriately licensed, registered, or certified to deliver care in the state where the patient is located as indicated above. If you are not or unsure, please re-schedule the visit: Yes, I confirm.   [place] Consult to Tele-Psych  Consult performed by: Guanakito Moreau APRN - CNP  Consult ordered by: Nati Henderson MD  Reason for consult: psych eval           Total time spent on this encounter:  90 minutes    --HANG Rand CNP on 3/8/2025 at 4:12 PM    An electronic signature was used to authenticate this note.

## 2025-03-09 PROBLEM — F31.9 BIPOLAR 1 DISORDER (HCC): Status: ACTIVE | Noted: 2025-03-09

## 2025-03-09 LAB
ETHANOLAMINE SERPL-MCNC: 51 MG/DL (ref 0–0.08)
ETHANOLAMINE SERPL-MCNC: 92 MG/DL (ref 0–0.08)

## 2025-03-09 PROCEDURE — 6370000000 HC RX 637 (ALT 250 FOR IP): Performed by: PSYCHIATRY & NEUROLOGY

## 2025-03-09 PROCEDURE — 2500000003 HC RX 250 WO HCPCS: Performed by: STUDENT IN AN ORGANIZED HEALTH CARE EDUCATION/TRAINING PROGRAM

## 2025-03-09 PROCEDURE — 6360000002 HC RX W HCPCS: Performed by: EMERGENCY MEDICINE

## 2025-03-09 PROCEDURE — 6370000000 HC RX 637 (ALT 250 FOR IP): Performed by: EMERGENCY MEDICINE

## 2025-03-09 PROCEDURE — 6370000000 HC RX 637 (ALT 250 FOR IP)

## 2025-03-09 PROCEDURE — 1240000000 HC EMOTIONAL WELLNESS R&B

## 2025-03-09 PROCEDURE — 96372 THER/PROPH/DIAG INJ SC/IM: CPT

## 2025-03-09 PROCEDURE — 90791 PSYCH DIAGNOSTIC EVALUATION: CPT | Performed by: COUNSELOR

## 2025-03-09 PROCEDURE — G0480 DRUG TEST DEF 1-7 CLASSES: HCPCS

## 2025-03-09 RX ORDER — LANOLIN ALCOHOL/MO/W.PET/CERES
100 CREAM (GRAM) TOPICAL DAILY
Status: DISCONTINUED | OUTPATIENT
Start: 2025-03-10 | End: 2025-03-13 | Stop reason: HOSPADM

## 2025-03-09 RX ORDER — CHLORDIAZEPOXIDE HYDROCHLORIDE 5 MG/1
5 CAPSULE, GELATIN COATED ORAL 4 TIMES DAILY
Status: DISCONTINUED | OUTPATIENT
Start: 2025-03-09 | End: 2025-03-09

## 2025-03-09 RX ORDER — HALOPERIDOL 5 MG/ML
3 INJECTION INTRAMUSCULAR EVERY 6 HOURS PRN
Status: DISCONTINUED | OUTPATIENT
Start: 2025-03-09 | End: 2025-03-13 | Stop reason: HOSPADM

## 2025-03-09 RX ORDER — CHLORDIAZEPOXIDE HYDROCHLORIDE 25 MG/1
25 CAPSULE, GELATIN COATED ORAL EVERY 4 HOURS
Status: DISCONTINUED | OUTPATIENT
Start: 2025-03-09 | End: 2025-03-11

## 2025-03-09 RX ORDER — MULTIVITAMIN WITH IRON
1 TABLET ORAL DAILY
Status: DISCONTINUED | OUTPATIENT
Start: 2025-03-10 | End: 2025-03-13 | Stop reason: HOSPADM

## 2025-03-09 RX ORDER — FOLIC ACID 1 MG/1
1 TABLET ORAL DAILY
Status: DISCONTINUED | OUTPATIENT
Start: 2025-03-10 | End: 2025-03-13 | Stop reason: HOSPADM

## 2025-03-09 RX ORDER — PANTOPRAZOLE SODIUM 40 MG/1
40 TABLET, DELAYED RELEASE ORAL
Status: DISCONTINUED | OUTPATIENT
Start: 2025-03-09 | End: 2025-03-13 | Stop reason: HOSPADM

## 2025-03-09 RX ORDER — MAGNESIUM HYDROXIDE/ALUMINUM HYDROXICE/SIMETHICONE 120; 1200; 1200 MG/30ML; MG/30ML; MG/30ML
30 SUSPENSION ORAL PRN
Status: DISCONTINUED | OUTPATIENT
Start: 2025-03-09 | End: 2025-03-13 | Stop reason: HOSPADM

## 2025-03-09 RX ORDER — DIVALPROEX SODIUM 250 MG/1
250 TABLET, DELAYED RELEASE ORAL EVERY 12 HOURS SCHEDULED
Status: DISCONTINUED | OUTPATIENT
Start: 2025-03-09 | End: 2025-03-13 | Stop reason: HOSPADM

## 2025-03-09 RX ORDER — ACETAMINOPHEN 325 MG/1
650 TABLET ORAL EVERY 4 HOURS PRN
Status: DISCONTINUED | OUTPATIENT
Start: 2025-03-09 | End: 2025-03-13 | Stop reason: HOSPADM

## 2025-03-09 RX ORDER — HALOPERIDOL 2 MG/1
3 TABLET ORAL EVERY 6 HOURS PRN
Status: DISCONTINUED | OUTPATIENT
Start: 2025-03-09 | End: 2025-03-13 | Stop reason: HOSPADM

## 2025-03-09 RX ORDER — HYDROXYZINE HYDROCHLORIDE 25 MG/1
25 TABLET, FILM COATED ORAL 3 TIMES DAILY PRN
Status: DISCONTINUED | OUTPATIENT
Start: 2025-03-09 | End: 2025-03-13 | Stop reason: HOSPADM

## 2025-03-09 RX ORDER — LOPERAMIDE HYDROCHLORIDE 2 MG/1
2 CAPSULE ORAL 3 TIMES DAILY PRN
Status: DISCONTINUED | OUTPATIENT
Start: 2025-03-09 | End: 2025-03-13 | Stop reason: HOSPADM

## 2025-03-09 RX ORDER — CHLORDIAZEPOXIDE HYDROCHLORIDE 25 MG/1
25 CAPSULE, GELATIN COATED ORAL EVERY 4 HOURS PRN
Status: DISCONTINUED | OUTPATIENT
Start: 2025-03-09 | End: 2025-03-09

## 2025-03-09 RX ORDER — IBUPROFEN 800 MG/1
800 TABLET, FILM COATED ORAL EVERY 8 HOURS PRN
Status: DISCONTINUED | OUTPATIENT
Start: 2025-03-09 | End: 2025-03-11 | Stop reason: SDUPTHER

## 2025-03-09 RX ORDER — PHENOBARBITAL SODIUM 65 MG/ML
130 INJECTION, SOLUTION INTRAMUSCULAR; INTRAVENOUS ONCE
Status: COMPLETED | OUTPATIENT
Start: 2025-03-09 | End: 2025-03-09

## 2025-03-09 RX ORDER — NICOTINE 21 MG/24HR
1 PATCH, TRANSDERMAL 24 HOURS TRANSDERMAL DAILY
Status: DISCONTINUED | OUTPATIENT
Start: 2025-03-09 | End: 2025-03-09

## 2025-03-09 RX ORDER — ONDANSETRON 4 MG/1
4 TABLET, ORALLY DISINTEGRATING ORAL EVERY 8 HOURS PRN
Status: DISCONTINUED | OUTPATIENT
Start: 2025-03-09 | End: 2025-03-13 | Stop reason: HOSPADM

## 2025-03-09 RX ADMIN — IBUPROFEN 800 MG: 800 TABLET, FILM COATED ORAL at 07:57

## 2025-03-09 RX ADMIN — DIVALPROEX SODIUM 250 MG: 250 TABLET, DELAYED RELEASE ORAL at 20:31

## 2025-03-09 RX ADMIN — PHENOBARBITAL SODIUM 130 MG: 65 INJECTION INTRAMUSCULAR; INTRAVENOUS at 07:56

## 2025-03-09 RX ADMIN — CHLORDIAZEPOXIDE HYDROCHLORIDE 25 MG: 25 CAPSULE ORAL at 16:30

## 2025-03-09 RX ADMIN — ACETAMINOPHEN 650 MG: 325 TABLET ORAL at 19:14

## 2025-03-09 RX ADMIN — Medication 3 MG: at 20:31

## 2025-03-09 RX ADMIN — SODIUM CHLORIDE, PRESERVATIVE FREE 10 ML: 5 INJECTION INTRAVENOUS at 08:03

## 2025-03-09 RX ADMIN — ONDANSETRON 4 MG: 4 TABLET, ORALLY DISINTEGRATING ORAL at 07:56

## 2025-03-09 RX ADMIN — DIVALPROEX SODIUM 250 MG: 250 TABLET, DELAYED RELEASE ORAL at 13:05

## 2025-03-09 RX ADMIN — LOPERAMIDE HYDROCHLORIDE 2 MG: 2 CAPSULE ORAL at 16:30

## 2025-03-09 RX ADMIN — PANTOPRAZOLE SODIUM 40 MG: 40 TABLET, DELAYED RELEASE ORAL at 16:30

## 2025-03-09 RX ADMIN — CHLORDIAZEPOXIDE HYDROCHLORIDE 25 MG: 25 CAPSULE ORAL at 20:31

## 2025-03-09 ASSESSMENT — PAIN DESCRIPTION - LOCATION
LOCATION: HEAD
LOCATION: HEAD

## 2025-03-09 ASSESSMENT — PATIENT HEALTH QUESTIONNAIRE - PHQ9
1. LITTLE INTEREST OR PLEASURE IN DOING THINGS: NOT AT ALL
SUM OF ALL RESPONSES TO PHQ QUESTIONS 1-9: 0
1. LITTLE INTEREST OR PLEASURE IN DOING THINGS: NOT AT ALL
2. FEELING DOWN, DEPRESSED OR HOPELESS: NOT AT ALL
2. FEELING DOWN, DEPRESSED OR HOPELESS: NOT AT ALL
SUM OF ALL RESPONSES TO PHQ QUESTIONS 1-9: 0

## 2025-03-09 ASSESSMENT — SLEEP AND FATIGUE QUESTIONNAIRES
DO YOU HAVE DIFFICULTY SLEEPING: COMMENT
DO YOU USE A SLEEP AID: YES
SLEEP PATTERN: NORMAL
DO YOU USE A SLEEP AID: YES
AVERAGE NUMBER OF SLEEP HOURS: 8
AVERAGE NUMBER OF SLEEP HOURS: 6
SLEEP PATTERN: DIFFICULTY FALLING ASLEEP
DO YOU HAVE DIFFICULTY SLEEPING: NO

## 2025-03-09 ASSESSMENT — PAIN SCALES - GENERAL
PAINLEVEL_OUTOF10: 0
PAINLEVEL_OUTOF10: 4
PAINLEVEL_OUTOF10: 10

## 2025-03-09 ASSESSMENT — LIFESTYLE VARIABLES
HOW MANY STANDARD DRINKS CONTAINING ALCOHOL DO YOU HAVE ON A TYPICAL DAY: 10 OR MORE
HOW OFTEN DO YOU HAVE A DRINK CONTAINING ALCOHOL: 2-3 TIMES A WEEK

## 2025-03-09 ASSESSMENT — PAIN - FUNCTIONAL ASSESSMENT: PAIN_FUNCTIONAL_ASSESSMENT: NONE - DENIES PAIN

## 2025-03-09 ASSESSMENT — PAIN DESCRIPTION - DESCRIPTORS: DESCRIPTORS: ACHING;DISCOMFORT;POUNDING

## 2025-03-09 NOTE — VIRTUAL HEALTH
Caterina M Pj  [id number]  1975     Social Work Behavioral Health Crisis Assessment    03/09/25    Chief Complaint: suicidal ideation; pink slip    HPI: Patient is a 49 y.o. White (non-) female who presents for psych eval. Patient presented to the ED on 03/09/25 from home. Per chart, \"presenting for mental health problem. Patient pink slipped prior to arrival. Per pink slip, patient sent alarming text messages to friends and was found in garage with the car running. Patient tearful on evaluation, states she has been drinking the past couple days. She is drank 4 bottles of wine lactic was this morning. She states she is always sad and has been that way since high school. She states she gets sad when she drinks alcohol. She denies HI. Denies other drug use. She is not currently seen by counselor and not on any psychiatric medications.\"    Past Psychiatric History:  Previous Diagnoses/symptoms: bipolar , alcohol abuse  Previous suicide attempts/self-harm: yes, states a few times ,reports attempts by drinking  Inpatient psychiatric hospitalizations: yes  Current outpatient psychiatric provider: Denies  Current therapist: States not in therapy  Previous psychiatric medication trials: No prior medication trials  Current psychiatric medications: zoloft  - not had 3 days, wasn't working anymore  Family Psychiatric History: Denies    Sleep Hours: 6-8    Sleep concerns: denies    Use of sleep medications: denies    Substance Abuse History:  Tobacco: Denies  Alcohol:  can go weeks without & then binge, at home all of sudden  Marijuana: Denies  Stimulant: Denies  Opiates: Denies  Benzodiazepine: Denies  Other illicit drug usage: Denies  History of substance/alcohol abuse treatment: Yes    Social History:  Education: College degree, associate, travel & hospitality  Living Situation/Interest: with pets - dog & 2 cats  Marital/Committed relationship and parenting hx: single, no kids  Occupation: employed  Legal

## 2025-03-09 NOTE — ED NOTES
Nurse to nurse report given to Mikayla RIOS on [address] which includes patient presentation complaint, pink slip, medications, lab results EKG Qtc, and past medical/psych history and admitting orders.

## 2025-03-09 NOTE — PLAN OF CARE
Pt denies homicidal/suicidal thoughts. Denies hallucinations. Pt reports anxiety 2 on a scale 0-10. Denies depression. Pt appears tearful and sad. Pt reports she miss her pets and wants to go home. Pt is cooperative. Will continue to monitor and provide support.   Problem: Involuntary Admit  Goal: Will cooperate with staff recommendations and doctor's orders and will demonstrate appropriate behavior  Description: INTERVENTIONS:  1. Treat underlying conditions and offer medication as ordered  2. Educate regarding involuntary admission procedures and rules  3. Contain excessive/inappropriate behavior per unit and hospital policies  Outcome: Progressing     Problem: Self Care Deficit  Goal: Return ADL status to a safe level of function  Description: INTERVENTIONS:  1. Administer medication as ordered  2. Assess ADL deficits and provide assistive devices as needed  3. Obtain PT/OT consults as needed  4. Assist and instruct patient to increase activity and self care as tolerated  Outcome: Progressing     Problem: Anxiety  Goal: Will report anxiety at manageable levels  Description: INTERVENTIONS:  1. Administer medication as ordered  2. Teach and rehearse alternative coping skills  3. Provide emotional support with 1:1 interaction with staff  Outcome: Not Progressing

## 2025-03-09 NOTE — BH NOTE
[facility]  Admission Note     Admission Type:   Admission Type: Involuntary    Reason for admission:  Reason for Admission: I drank to much and then I got into the car and turned it on      Addictive Behavior:   Addictive Behavior  In the Past 3 Months, Have You Felt or Has Someone Told You That You Have a Problem With  : None    Medical Problems:   Past Medical History:   Diagnosis Date    Cerebral artery occlusion with cerebral infarction (HCC)     Diverticulosis     GERD without esophagitis     Irritable bowel syndrome     Knee pain     Morbid obesity due to excess calories     Obesity     Sleep apnea     CPAP setting 11       Status EXAM:  Mental Status and Behavioral Exam  Normal: No  Level of Assistance: Independent/Self  Facial Expression: Sad  Affect: Unstable  Level of Consciousness: Alert  Frequency of Checks: 4 times per hour, close  Mood:Normal: No  Mood: Depressed, Sad, Anxious  Motor Activity:Normal: No  Motor Activity: Decreased  Eye Contact: Fair  Observed Behavior: Withdrawn, Cooperative  Sexual Misconduct History: Current - no  Preception: [place] to person, [place] to time, [place] to place, [place] to situation  Attention:Normal: Yes  Thought Processes: Unremarkable  Thought Content:Normal: Yes  Depression Symptoms: Crying  Anxiety Symptoms: Generalized  [place] Symptoms: No problems reported or observed.  Hallucinations: None  Delusions: No  Memory:Normal: Yes  Insight and Judgment: No  Insight and Judgment: Poor judgment, Poor insight    Tobacco Screening:  Practical Counseling, on admission, brcok X, if applicable and completed (first 3 are required if patient doesn't refuse):            ( ) Recognizing danger situations (included triggers and roadblocks)                    ( ) Coping skills (new ways to manage stress,relaxation techniques, changing routine, distraction)                                                           ( ) Basic information about quitting (benefits of

## 2025-03-09 NOTE — ED NOTES
Patient awake, alert on AM rounds. BP remains elevated. Still reporting headache and Nausea. CIWA score of 7. Calm, cooperative. Remains labile at times.

## 2025-03-09 NOTE — BH NOTE
4 Eyes Skin Assessment     NAME:  Caterina Oliva  YOB: 1975  MEDICAL RECORD NUMBER:  [id number]    The patient is being assessed for  Admission    I agree that at least one RN has performed a thorough Head to Toe Skin Assessment on the patient. ALL assessment sites listed below have been assessed.      Areas assessed by both nurses:    Head, Face, Ears, Shoulders, Back, Chest, Arms, Elbows, Hands, Sacrum. Buttock, Coccyx, Ischium, and Legs. Feet and Heels        Does the Patient have a Wound? No noted wound(s)       Donn Prevention initiated by RN: No  Wound Care Orders initiated by RN: No    Pressure Injury (Stage 3,4, Unstageable, DTI, NWPT, and Complex wounds) if present, place Wound referral order by RN under : No    New Ostomies, if present place, Ostomy referral order under : No     Nurse 1 eSignature: Electronically signed by Tha Lawton RN on 3/9/25 at 12:33 PM EDT    **SHARE this note so that the co-signing nurse can place an eSignature**    Nurse 2 eSignature: Electronically signed by Meghan Romero RN on 3/9/25 at 3:38 PM EDT

## 2025-03-09 NOTE — CARE COORDINATION
Biopsychosocial Assessment Note    Social work met with patient to complete the biopsychosocial assessment and C-SSRS.     Chief Complaint: \"At the end of the day, I drank too much and made stupid decisions.\"     Mental Status Exam:  Pt was alert and oriented x4, guilty, sad, depressed and anxious.  Pt was logical, had good hygiene and was cooperative and friendly.  Pt was tearful at times.  Pt denies SI / HI / AVH.     Clinical Summary:  Pt is a 49 year old female that was placed on an involuntary hold by the [facility].  Per ED note, \"presenting for mental health problem. Patient pink slipped prior to arrival. Per pink slip, patient sent alarming text messages to friends and was found in garage with the car running. Patient tearful on evaluation, states she has been drinking the past couple days. She is drank 4 bottles of wine lactic was this morning. She states she is always sad and has been that way since high school. She states she gets sad when she drinks alcohol. She denies HI. Denies other drug use. She is not currently seen by counselor and not on any psychiatric medications.\"     Pt resides alone in [place] with her two cats and emotional support dog.  She reports having one previous inpatient psychiatric hospitalization about \"30 years ago.\"  She is currently prescribed generic Zoloft as of last April and Dr. Cruz in [facility] is her prescriber.  She states that this medication has helped her so much that others have noticed and she feels that an increase in dosage may help.  Pt denies current SI, HI, AVH.  She reports having had \"a lot\" of suicide attempts, \"since I was little.\"  Pt stated, \"I push the limit and it takes me to the edge.\"  She reports having to be hospitalized six months ago as she drank so much she was dehydrated and could not move.  She also states that when she drinks alcohol she gets extremely depressed.  Pt denies having any self-harm behaviors.  Pt feels extreme guilt and

## 2025-03-09 NOTE — ED NOTES
Pt accepted to 7W by Dr. JOAQUIM Wills.     SW spoke with Genevieve in admitting. Pt assigned to 0901A.     ED nurse notified to call N2N at [phone].

## 2025-03-10 PROBLEM — F31.63 BIPOLAR DISORDER, CURRENT EPISODE MIXED, SEVERE (HCC): Status: ACTIVE | Noted: 2025-03-10

## 2025-03-10 PROBLEM — F10.20 ALCOHOL DEPENDENCE: Status: RESOLVED | Noted: 2025-03-10 | Resolved: 2025-03-10

## 2025-03-10 PROBLEM — F10.20 ALCOHOL DEPENDENCE (HCC): Status: ACTIVE | Noted: 2025-03-10

## 2025-03-10 LAB
EKG ATRIAL RATE: 64 BPM
EKG P AXIS: 54 DEGREES
EKG P-R INTERVAL: 204 MS
EKG Q-T INTERVAL: 440 MS
EKG QRS DURATION: 96 MS
EKG QTC CALCULATION (BAZETT): 453 MS
EKG R AXIS: 39 DEGREES
EKG T AXIS: 45 DEGREES
EKG VENTRICULAR RATE: 64 BPM

## 2025-03-10 PROCEDURE — 6370000000 HC RX 637 (ALT 250 FOR IP)

## 2025-03-10 PROCEDURE — 6370000000 HC RX 637 (ALT 250 FOR IP): Performed by: PSYCHIATRY & NEUROLOGY

## 2025-03-10 PROCEDURE — 93010 ELECTROCARDIOGRAM REPORT: CPT | Performed by: INTERNAL MEDICINE

## 2025-03-10 PROCEDURE — 90792 PSYCH DIAG EVAL W/MED SRVCS: CPT

## 2025-03-10 PROCEDURE — 1240000000 HC EMOTIONAL WELLNESS R&B

## 2025-03-10 RX ORDER — ACAMPROSATE CALCIUM 333 MG/1
333 TABLET, DELAYED RELEASE ORAL 3 TIMES DAILY
Status: DISCONTINUED | OUTPATIENT
Start: 2025-03-10 | End: 2025-03-12

## 2025-03-10 RX ORDER — OLANZAPINE 5 MG/1
5 TABLET ORAL NIGHTLY
Status: DISCONTINUED | OUTPATIENT
Start: 2025-03-10 | End: 2025-03-13

## 2025-03-10 RX ADMIN — LOPERAMIDE HYDROCHLORIDE 2 MG: 2 CAPSULE ORAL at 20:32

## 2025-03-10 RX ADMIN — MULTIVITAMIN TABLET 1 TABLET: TABLET at 08:15

## 2025-03-10 RX ADMIN — CHLORDIAZEPOXIDE HYDROCHLORIDE 25 MG: 25 CAPSULE ORAL at 12:06

## 2025-03-10 RX ADMIN — ACAMPROSATE CALCIUM ENTERIC-COATED 333 MG: 333 TABLET, DELAYED RELEASE ORAL at 16:31

## 2025-03-10 RX ADMIN — PANTOPRAZOLE SODIUM 40 MG: 40 TABLET, DELAYED RELEASE ORAL at 06:48

## 2025-03-10 RX ADMIN — CHLORDIAZEPOXIDE HYDROCHLORIDE 25 MG: 25 CAPSULE ORAL at 20:32

## 2025-03-10 RX ADMIN — Medication 100 MG: at 08:15

## 2025-03-10 RX ADMIN — CHLORDIAZEPOXIDE HYDROCHLORIDE 25 MG: 25 CAPSULE ORAL at 08:15

## 2025-03-10 RX ADMIN — ACETAMINOPHEN 650 MG: 325 TABLET ORAL at 05:09

## 2025-03-10 RX ADMIN — CHLORDIAZEPOXIDE HYDROCHLORIDE 25 MG: 25 CAPSULE ORAL at 15:32

## 2025-03-10 RX ADMIN — LOPERAMIDE HYDROCHLORIDE 2 MG: 2 CAPSULE ORAL at 15:32

## 2025-03-10 RX ADMIN — DIVALPROEX SODIUM 250 MG: 250 TABLET, DELAYED RELEASE ORAL at 08:15

## 2025-03-10 RX ADMIN — CHLORDIAZEPOXIDE HYDROCHLORIDE 25 MG: 25 CAPSULE ORAL at 01:14

## 2025-03-10 RX ADMIN — FOLIC ACID 1 MG: 1 TABLET ORAL at 08:15

## 2025-03-10 RX ADMIN — CHLORDIAZEPOXIDE HYDROCHLORIDE 25 MG: 25 CAPSULE ORAL at 05:08

## 2025-03-10 RX ADMIN — ACAMPROSATE CALCIUM ENTERIC-COATED 333 MG: 333 TABLET, DELAYED RELEASE ORAL at 20:32

## 2025-03-10 RX ADMIN — OLANZAPINE 5 MG: 5 TABLET, FILM COATED ORAL at 20:32

## 2025-03-10 RX ADMIN — DIVALPROEX SODIUM 250 MG: 250 TABLET, DELAYED RELEASE ORAL at 20:32

## 2025-03-10 ASSESSMENT — PAIN SCALES - GENERAL
PAINLEVEL_OUTOF10: 4
PAINLEVEL_OUTOF10: 0
PAINLEVEL_OUTOF10: 3
PAINLEVEL_OUTOF10: 2

## 2025-03-10 ASSESSMENT — PAIN DESCRIPTION - DESCRIPTORS
DESCRIPTORS: ACHING;DISCOMFORT
DESCRIPTORS: ACHING;SORE;DISCOMFORT
DESCRIPTORS: ACHING;DISCOMFORT

## 2025-03-10 ASSESSMENT — PAIN DESCRIPTION - LOCATION
LOCATION: HEAD

## 2025-03-10 NOTE — CARE COORDINATION
Peer Recovery Support Note    Name: Caterina Oliva  Date: 3/10/2025    Chief Complaint   Patient presents with    Mental Health Problem     SA by locking  self in garage with car running.   No loss of consciousness.  Etoh consumption since Wednesday. Patient feeling guilty, tearful. Labile. Very apologetic.        Peer Support met with patient.  [x] Support and education provided  [x] Resources provided   [x] Treatment referral: Outpatient Counseling  [] Other:   [] Patient declined peer recovery services     Referred By: Thankful (RT)    Notes: Patient was pleasant and open to talk about her history with drinking and depression. She was agreeable to engage in counseling one on one but not for IOP. She stated that group settings are not for her. She also said she has lots of support from friends and her mom. She works from home and has a sevice dog for support. Peer provided support and resources for the patient.     Signed: Manasa Gross, 3/10/2025

## 2025-03-10 NOTE — GROUP NOTE
Group Therapy Note    Date: 3/10/2025    Group Start Time: 1015  Group End Time: 1040  Group Topic: Psychoeducation    [facility] AC[facility] BH 1    Sulma Soria, ROMELIAS    Type of Group: Psychoeducation    Module Name:  Coping Skills for daily management .    Patient's Goal:  Pt will be able to id different types of coping skills to lessen stressors.     Notes:  Pleasant and engaged in group, sharing and accepting of handout.     Status After Intervention:  Improved    Participation Level: Active Listener and Interactive    Participation Quality: Appropriate, Attentive, and Sharing      Speech:  normal      Thought Process/Content: Logical      Affective Functioning: Congruent      Mood: euthymic      Level of consciousness:  Alert, Oriented x4, and Attentive      Response to Learning: Able to verbalize/acknowledge new learning, Able to retain information, and Progressing to goal      Endings: None Reported    Modes of Intervention: Education, Support, Socialization, and Exploration      Discipline Responsible: Psychoeducational Specialist      Signature:  Sulma Soria, MARTIN

## 2025-03-10 NOTE — BH NOTE
Patient denies suicidal ideation, homicidal ideations and AVH.  Presents sad, calm and cooperative during assessment.  Patient is out on the unit and is social with peers.  Medications taken without issue.  No complaints or concerns verbalized at this time.  No unit problems reported.  Will continue to observe and support.

## 2025-03-10 NOTE — GROUP NOTE
Shared goal for the day as to participate.                                                                         Group Therapy Note    Date: 3/10/2025    Group Start Time: 1000  Group End Time: 1015  Group Topic: Community Meeting    [facility] 1    Sulma Soria, MARTIN    Type of Group: Community Meeting      Patient's Goal:  Patient will be able to id staffing assignments, expectations of patients, and general information re: floor rules. Will be prompted to share goal for the day.     Notes:  Patient appeared to be an active listener, taking in information presented and was prompted to share goal for the day.    Status After Intervention:  Improved    Participation Level: Active Listener and Interactive    Participation Quality: Appropriate, Attentive, Sharing, and Supportive      Speech:  normal      Thought Process/Content: Logical      Affective Functioning: Congruent      Mood: euthymic      Level of consciousness:  Alert and Oriented x4      Response to Learning: Able to verbalize current knowledge/experience      Endings: None Reported    Modes of Intervention: Support and Clarifying      Discipline Responsible: Psychoeducational Specialist      Signature:  MARTIN Hope

## 2025-03-10 NOTE — GROUP NOTE
Group Therapy Note    Date: 3/10/2025    Group Start Time: 1400  Group End Time: 1430  Group Topic: Cognitive Skills    [facility] AC[facility] 2    Jazmyn Webb MSW, LSW        Group Therapy Note    Attendees: 5       Patient's Goal:  pt will be able to change their mindset and work towards developing a growth mindset.    Notes:  pt participated in group and made connections.     Status After Intervention:  Improved    Participation Level: Active Listener and Interactive    Participation Quality: Appropriate, Attentive, Sharing, and Supportive      Speech:  normal      Thought Process/Content: Logical  Linear      Affective Functioning: Congruent      Mood: anxious      Level of consciousness:  Alert, Oriented x4, and Attentive      Response to Learning: Able to verbalize current knowledge/experience, Able to verbalize/acknowledge new learning, Able to retain information, and Capable of insight      Endings: None Reported    Modes of Intervention: Education, Support, Socialization, Exploration, Clarifying, and Problem-solving      Discipline Responsible: /Counselor      Signature:  CHEMO Felix LSW

## 2025-03-10 NOTE — CARE COORDINATION
SW called [facility] [phone] to refer pt for services. Pt has mental health counseling appointment 3/14 at 3:15pm in office.

## 2025-03-10 NOTE — CARE COORDINATION
Collateral Contact (DONNIE signed)  Name:  Maria Eugenia  Relationship:  friend  Number: [phone]     Collateral Information: Sw spoke with pts friend- Maria Eugenia. She stated that pt told her that she was sick and she sounded sick. She stated that pt gets down sometimes and often has a hard time getting out of it. She stated that pt does live alone and works from home. She stated that pt has highs and lows. She stated that when something goes wrong for pt she will shut down. She stated that since pts stroke, pt has been having a hard time catching up finically. She stated that the stroke messed up pts memory. She stated that pt is a hard worker when she is not down. She stated that she talked to pt yesterday and pt is not happy about discharge, but is happy about getting help and resources. She stated that as far as she knows, pt can return home and there are no guns/weapons at home.      Access to Weapons per Collateral Contact: [] Reports [x] Denies

## 2025-03-10 NOTE — H&P
Department of Psychiatry  History and Physical - Adult     CHIEF COMPLAINT:    Chief Complaint   Patient presents with    Mental Health Problem     SA by locking  self in garage with car running.   No loss of consciousness.  Etoh consumption since Wednesday. Patient feeling guilty, tearful. Labile. Very apologetic.      Patient personally seen and examined by me and mental status exam performed.  I agree the below assessment by the medical student. Psychomotor revealed no agitation, retardation, or any other abnormal or odd postures. Speech was coherent, normal rate, rhythm, and tone. Eye contact is fair. Patient states that her mood is \"better today\". Affect was mood congruent, appropriate, and pleasant.Thought process is linear, without flight of ideas, loose associations. Thought contents are devoid of auditory or visual hallucinations, delusions, or any other perceptual abnormalities. She does not seem to be internally stimulated or scanning surroundings during the interview.Patient denies current suicidal ideation, intent, or plan. Denies homicidal ideation, intent, or plan. Cognitive function appears to be at baseline evidenced by her ability to spell world backwards. Patients short-term and long-term memory seemed to be intact based on conversation. Her language is adequate. She is able to communicate and respond relevant answers and attention. Her concentration seems adequate. She is able to participate appropriately throughout the interview. Her insight and judgement is fair. Her impulse control is adequate. Patient is alert and oriented to person, place, time, and situation.       Patient was seen after discussing with the treatment team and reviewing the chart    CIRCUMSTANCES OF ADMISSION:     Patient name: Caterina Oliva  Patient's past mental health and addiction history: Bipolar disorder, Alcohol use disorder  Patient's presentation to the ED and why the patient needs admission: Suicide attempt while

## 2025-03-10 NOTE — PLAN OF CARE
Problem: Risk for Elopement  Goal: Patient will not exit the unit/facility without proper excort  3/10/2025 0854 by Meghan Romero RN  Outcome: Progressing     Problem: Self Harm/Suicidality  Goal: Will have no self-injury during hospital stay  Description: INTERVENTIONS:  1.  Ensure constant observer at bedside with Q15M safety checks  2.  Maintain a safe environment  3.  Secure patient belongings  4.  Ensure family/visitors adhere to safety recommendations  5.  Ensure safety tray has been added to patient's diet order  6.  Every shift and PRN: Re-assess suicidal risk via Frequent Screener    3/10/2025 0854 by Meghan Romero RN  Outcome: Progressing     Problem: Depression  Goal: Will be euthymic at discharge  Description: INTERVENTIONS:  1. Administer medication as ordered  2. Provide emotional support via 1:1 interaction with staff  3. Encourage involvement in milieu/groups/activities  4. Monitor for social isolation  3/10/2025 0854 by Meghan Romero RN  Outcome: Progressing     Problem: Behavior  Goal: Pt/Family maintain appropriate behavior and adhere to behavioral management agreement, if implemented  Description: INTERVENTIONS:  1. Assess patient/family's coping skills and  non-compliant behavior (including use of illegal substances)  2. Notify security of behavior or suspected illegal substances which indicate the need for search of the family and/or belongings  3. Encourage verbalization of thoughts and concerns in a socially appropriate manner  4. Utilize positive, consistent limit setting strategies supporting safety of patient, staff and others  5. Encourage participation in the decision making process about the behavioral management agreement  6. If a visitor's behavior poses a threat to safety call refer to organization policy.  7. Initiate consult with , Psychosocial CNS, Spiritual Care as appropriate  3/10/2025 0854 by Meghan Romero RN  Outcome: Progressing

## 2025-03-11 PROCEDURE — 6370000000 HC RX 637 (ALT 250 FOR IP)

## 2025-03-11 PROCEDURE — 99232 SBSQ HOSP IP/OBS MODERATE 35: CPT

## 2025-03-11 PROCEDURE — 1240000000 HC EMOTIONAL WELLNESS R&B

## 2025-03-11 PROCEDURE — 6370000000 HC RX 637 (ALT 250 FOR IP): Performed by: PSYCHIATRY & NEUROLOGY

## 2025-03-11 RX ORDER — CHLORDIAZEPOXIDE HYDROCHLORIDE 25 MG/1
25 CAPSULE, GELATIN COATED ORAL EVERY 8 HOURS
Status: DISCONTINUED | OUTPATIENT
Start: 2025-03-11 | End: 2025-03-12

## 2025-03-11 RX ORDER — IBUPROFEN 800 MG/1
800 TABLET, FILM COATED ORAL EVERY 8 HOURS PRN
Status: DISCONTINUED | OUTPATIENT
Start: 2025-03-11 | End: 2025-03-13 | Stop reason: HOSPADM

## 2025-03-11 RX ADMIN — MULTIVITAMIN TABLET 1 TABLET: TABLET at 08:18

## 2025-03-11 RX ADMIN — ACAMPROSATE CALCIUM ENTERIC-COATED 333 MG: 333 TABLET, DELAYED RELEASE ORAL at 21:22

## 2025-03-11 RX ADMIN — Medication 100 MG: at 08:18

## 2025-03-11 RX ADMIN — FOLIC ACID 1 MG: 1 TABLET ORAL at 08:18

## 2025-03-11 RX ADMIN — ACAMPROSATE CALCIUM ENTERIC-COATED 333 MG: 333 TABLET, DELAYED RELEASE ORAL at 08:18

## 2025-03-11 RX ADMIN — CHLORDIAZEPOXIDE HYDROCHLORIDE 25 MG: 25 CAPSULE ORAL at 00:33

## 2025-03-11 RX ADMIN — ACETAMINOPHEN 650 MG: 325 TABLET ORAL at 21:23

## 2025-03-11 RX ADMIN — LOPERAMIDE HYDROCHLORIDE 2 MG: 2 CAPSULE ORAL at 08:18

## 2025-03-11 RX ADMIN — OLANZAPINE 5 MG: 5 TABLET, FILM COATED ORAL at 21:22

## 2025-03-11 RX ADMIN — ACAMPROSATE CALCIUM ENTERIC-COATED 333 MG: 333 TABLET, DELAYED RELEASE ORAL at 12:53

## 2025-03-11 RX ADMIN — DIVALPROEX SODIUM 250 MG: 250 TABLET, DELAYED RELEASE ORAL at 08:18

## 2025-03-11 RX ADMIN — PANTOPRAZOLE SODIUM 40 MG: 40 TABLET, DELAYED RELEASE ORAL at 06:19

## 2025-03-11 RX ADMIN — DIVALPROEX SODIUM 250 MG: 250 TABLET, DELAYED RELEASE ORAL at 21:22

## 2025-03-11 RX ADMIN — ACETAMINOPHEN 650 MG: 325 TABLET ORAL at 08:18

## 2025-03-11 ASSESSMENT — PAIN DESCRIPTION - LOCATION
LOCATION: HEAD
LOCATION: HEAD

## 2025-03-11 ASSESSMENT — PAIN DESCRIPTION - DESCRIPTORS
DESCRIPTORS: ACHING
DESCRIPTORS: ACHING;DISCOMFORT;SORE

## 2025-03-11 ASSESSMENT — PAIN SCALES - GENERAL
PAINLEVEL_OUTOF10: 3
PAINLEVEL_OUTOF10: 3

## 2025-03-11 NOTE — PLAN OF CARE
Problem: Risk for Elopement  Goal: Patient will not exit the unit/facility without proper excort  3/11/2025 1018 by Meghan Romero RN  Outcome: Progressing     Problem: Self Harm/Suicidality  Goal: Will have no self-injury during hospital stay  Description: INTERVENTIONS:  1.  Ensure constant observer at bedside with Q15M safety checks  2.  Maintain a safe environment  3.  Secure patient belongings  4.  Ensure family/visitors adhere to safety recommendations  5.  Ensure safety tray has been added to patient's diet order  6.  Every shift and PRN: Re-assess suicidal risk via Frequent Screener    3/11/2025 1018 by Meghan Romero RN  Outcome: Progressing     Problem: Depression  Goal: Will be euthymic at discharge  Description: INTERVENTIONS:  1. Administer medication as ordered  2. Provide emotional support via 1:1 interaction with staff  3. Encourage involvement in milieu/groups/activities  4. Monitor for social isolation  3/11/2025 1018 by Meghan Romero RN  Outcome: Progressing     Problem: Behavior  Goal: Pt/Family maintain appropriate behavior and adhere to behavioral management agreement, if implemented  Description: INTERVENTIONS:  1. Assess patient/family's coping skills and  non-compliant behavior (including use of illegal substances)  2. Notify security of behavior or suspected illegal substances which indicate the need for search of the family and/or belongings  3. Encourage verbalization of thoughts and concerns in a socially appropriate manner  4. Utilize positive, consistent limit setting strategies supporting safety of patient, staff and others  5. Encourage participation in the decision making process about the behavioral management agreement  6. If a visitor's behavior poses a threat to safety call refer to organization policy.  7. Initiate consult with , Psychosocial CNS, Spiritual Care as appropriate  3/11/2025 1018 by Meghan Romero RN  Outcome: Progressing

## 2025-03-11 NOTE — GROUP NOTE
Shared goal for the day as to try to wake up.                                                                       Group Therapy Note    Date: 3/11/2025    Group Start Time: 0900  Group End Time: 0915  Group Topic: Community Meeting    [facility] ACUTE  1    Sulma Soria CTRS    Type of Group: Community Meeting      Patient's Goal:  Patient will be able to id staffing assignments, expectations of patients, and general information re: floor rules. Will be prompted to share goal for the day.     Notes:  Patient appeared to be an active listener, taking in information presented and was prompted to share goal for the day.    Status After Intervention:  Improved    Participation Level: Active Listener and Interactive    Participation Quality: Appropriate, Attentive, and Sharing      Speech:  normal      Thought Process/Content: Logical      Affective Functioning: Congruent      Mood: euthymic      Level of consciousness:  Alert, Oriented x4, and Attentive      Response to Learning: Able to verbalize/acknowledge new learning, Able to retain information, and Progressing to goal      Endings: None Reported    Modes of Intervention: Education, Support, and Clarifying      Discipline Responsible: Psychoeducational Specialist      Signature:  MARTIN Hope

## 2025-03-11 NOTE — CARE COORDINATION
Sw met with pt to check in. She stated that she is tired. She stated that she threw up this morning. She denied anxiety and depression. She denied SI/HI/AVH. She stated that she will return home at discharge.

## 2025-03-11 NOTE — GROUP NOTE
Group Therapy Note    Date: 3/11/2025    Group Start Time: 1500  Group End Time: 1545  Group Topic: Recovery    [facility] AC[facility] BH 2    Sona Larkin CTRS    Group Therapy Note    Attendees: 9    Date: 3/11/2025  Start Time: 1500  End Time:  1545  Number of Participants: 9    Type of Group: Recovery    Name:  Peer Recovery    Patient's Goal:  Increased awareness of community recovery resources for substance abuse and mental health. Identified positive coping skills.    Notes:   facilitated group with ROMELIAS observing. Patient was an active listener in group and made positive responses in discussion.    Status After Intervention:  Improved    Participation Level: Active Listener and Interactive    Participation Quality: Appropriate, Attentive, and Sharing      Speech:  normal      Thought Process/Content: Logical  Linear      Affective Functioning: Congruent      Mood:  Appropriate      Level of consciousness:  Alert and Attentive      Response to Learning: Able to verbalize current knowledge/experience, Able to verbalize/acknowledge new learning, Able to retain information, Capable of insight, Able to change behavior, and Progressing to goal      Endings: None Reported    Modes of Intervention: Education, Support, Socialization, Exploration, Clarifying, and Problem-solving      Discipline Responsible: Psychoeducational Specialist      Signature:  MARTIN Dang

## 2025-03-11 NOTE — GROUP NOTE
Group Therapy Note    Date: 3/11/2025    Group Start Time: 1000  Group End Time: 1040  Group Topic: Psychoeducation    [facility] AC[facility] BH 1    Sulma Soria CTRS    Type of Group: Psychoeducation    Module Name:  Stop Overthinking     Patient's Goal:  Pt will be able to id skills to manage over thinking and work on his/her own decision making.     Notes:  Pleasant and engaged in group, able to share insight and accepting of handout.     Status After Intervention:  Improved    Participation Level: Active Listener and Interactive    Participation Quality: Appropriate, Attentive, and Sharing      Speech:  normal      Thought Process/Content: Logical      Affective Functioning: Congruent      Mood: euthymic      Level of consciousness:  Alert, Oriented x4, and Attentive      Response to Learning: Able to verbalize/acknowledge new learning, Able to retain information, and Progressing to goal      Endings: None Reported    Modes of Intervention: Education, Support, Socialization, and Clarifying      Discipline Responsible: Psychoeducational Specialist      Signature:  MARTIN Hope    Group Therapy Note    Attendees: 9

## 2025-03-11 NOTE — PLAN OF CARE
Problem: Risk for Elopement  Goal: Patient will not exit the unit/facility without proper excort  3/10/2025 2041 by Meghan Romero RN  Outcome: Progressing     Problem: Self Harm/Suicidality  Goal: Will have no self-injury during hospital stay  Description: INTERVENTIONS:  1.  Ensure constant observer at bedside with Q15M safety checks  2.  Maintain a safe environment  3.  Secure patient belongings  4.  Ensure family/visitors adhere to safety recommendations  5.  Ensure safety tray has been added to patient's diet order  6.  Every shift and PRN: Re-assess suicidal risk via Frequent Screener    3/10/2025 2041 by Meghan Romero RN  Outcome: Progressing     Problem: Depression  Goal: Will be euthymic at discharge  Description: INTERVENTIONS:  1. Administer medication as ordered  2. Provide emotional support via 1:1 interaction with staff  3. Encourage involvement in milieu/groups/activities  4. Monitor for social isolation  3/10/2025 2041 by Meghan Romero RN  Outcome: Progressing     Problem: Behavior  Goal: Pt/Family maintain appropriate behavior and adhere to behavioral management agreement, if implemented  Description: INTERVENTIONS:  1. Assess patient/family's coping skills and  non-compliant behavior (including use of illegal substances)  2. Notify security of behavior or suspected illegal substances which indicate the need for search of the family and/or belongings  3. Encourage verbalization of thoughts and concerns in a socially appropriate manner  4. Utilize positive, consistent limit setting strategies supporting safety of patient, staff and others  5. Encourage participation in the decision making process about the behavioral management agreement  6. If a visitor's behavior poses a threat to safety call refer to organization policy.  7. Initiate consult with , Psychosocial CNS, Spiritual Care as appropriate  3/10/2025 2041 by Meghan Romero RN  Outcome: Progressing

## 2025-03-12 PROCEDURE — 6370000000 HC RX 637 (ALT 250 FOR IP)

## 2025-03-12 PROCEDURE — 6370000000 HC RX 637 (ALT 250 FOR IP): Performed by: PSYCHIATRY & NEUROLOGY

## 2025-03-12 PROCEDURE — 1240000000 HC EMOTIONAL WELLNESS R&B

## 2025-03-12 PROCEDURE — 99232 SBSQ HOSP IP/OBS MODERATE 35: CPT

## 2025-03-12 RX ORDER — CHLORDIAZEPOXIDE HYDROCHLORIDE 25 MG/1
25 CAPSULE, GELATIN COATED ORAL EVERY 8 HOURS PRN
Status: DISCONTINUED | OUTPATIENT
Start: 2025-03-12 | End: 2025-03-13

## 2025-03-12 RX ORDER — ACAMPROSATE CALCIUM 333 MG/1
666 TABLET, DELAYED RELEASE ORAL 3 TIMES DAILY
Status: DISCONTINUED | OUTPATIENT
Start: 2025-03-12 | End: 2025-03-13 | Stop reason: HOSPADM

## 2025-03-12 RX ADMIN — DIVALPROEX SODIUM 250 MG: 250 TABLET, DELAYED RELEASE ORAL at 08:43

## 2025-03-12 RX ADMIN — CHLORDIAZEPOXIDE HYDROCHLORIDE 25 MG: 25 CAPSULE ORAL at 00:20

## 2025-03-12 RX ADMIN — FOLIC ACID 1 MG: 1 TABLET ORAL at 08:43

## 2025-03-12 RX ADMIN — Medication 3 MG: at 20:39

## 2025-03-12 RX ADMIN — OLANZAPINE 5 MG: 5 TABLET, FILM COATED ORAL at 20:39

## 2025-03-12 RX ADMIN — DIVALPROEX SODIUM 250 MG: 250 TABLET, DELAYED RELEASE ORAL at 20:39

## 2025-03-12 RX ADMIN — ACAMPROSATE CALCIUM ENTERIC-COATED 666 MG: 333 TABLET, DELAYED RELEASE ORAL at 20:39

## 2025-03-12 RX ADMIN — Medication 100 MG: at 08:43

## 2025-03-12 RX ADMIN — PANTOPRAZOLE SODIUM 40 MG: 40 TABLET, DELAYED RELEASE ORAL at 06:54

## 2025-03-12 RX ADMIN — ACAMPROSATE CALCIUM ENTERIC-COATED 666 MG: 333 TABLET, DELAYED RELEASE ORAL at 13:48

## 2025-03-12 RX ADMIN — MULTIVITAMIN TABLET 1 TABLET: TABLET at 08:43

## 2025-03-12 RX ADMIN — ACAMPROSATE CALCIUM ENTERIC-COATED 333 MG: 333 TABLET, DELAYED RELEASE ORAL at 08:43

## 2025-03-12 ASSESSMENT — PAIN SCALES - GENERAL
PAINLEVEL_OUTOF10: 0
PAINLEVEL_OUTOF10: 1

## 2025-03-12 ASSESSMENT — PAIN DESCRIPTION - LOCATION: LOCATION: HEAD

## 2025-03-12 ASSESSMENT — PAIN DESCRIPTION - DESCRIPTORS: DESCRIPTORS: ACHING;SORE

## 2025-03-12 NOTE — CARE COORDINATION
NOHEMI met with pt to check in. She stated that she is not doing good today. She stated that she is feeling drowsy. Pt seemed flat. She stated that her plan is to still return home at discharge. Pt denied SI/HI/AVH.

## 2025-03-12 NOTE — DISCHARGE INSTRUCTIONS
Kunal Lezama:    Care Guide- Chester Vang   [phone], Please follow up with Chester if you have any questions regarding insurance or outpatient support.

## 2025-03-12 NOTE — PLAN OF CARE
Problem: Self Harm/Suicidality  Goal: Will have no self-injury during hospital stay  Description: INTERVENTIONS:  1.  Ensure constant observer at bedside with Q15M safety checks  2.  Maintain a safe environment  3.  Secure patient belongings  4.  Ensure family/visitors adhere to safety recommendations  5.  Ensure safety tray has been added to patient's diet order  6.  Every shift and PRN: Re-assess suicidal risk via Frequent Screener    3/11/2025 2214 by Mena Motley RN  Outcome: Progressing     Problem: Depression  Goal: Will be euthymic at discharge  Description: INTERVENTIONS:  1. Administer medication as ordered  2. Provide emotional support via 1:1 interaction with staff  3. Encourage involvement in milieu/groups/activities  4. Monitor for social isolation  3/11/2025 2214 by Mena Motley RN  Outcome: Progressing     Problem: Behavior  Goal: Pt/Family maintain appropriate behavior and adhere to behavioral management agreement, if implemented  Description: INTERVENTIONS:  1. Assess patient/family's coping skills and  non-compliant behavior (including use of illegal substances)  2. Notify security of behavior or suspected illegal substances which indicate the need for search of the family and/or belongings  3. Encourage verbalization of thoughts and concerns in a socially appropriate manner  4. Utilize positive, consistent limit setting strategies supporting safety of patient, staff and others  5. Encourage participation in the decision making process about the behavioral management agreement  6. If a visitor's behavior poses a threat to safety call refer to organization policy.  7. Initiate consult with , Psychosocial CNS, Spiritual Care as appropriate  3/11/2025 2214 by Mena Motley RN  Outcome: Progressing     Problem: Anxiety  Goal: Will report anxiety at manageable levels  Description: INTERVENTIONS:  1. Administer medication as ordered  2. Teach and rehearse alternative coping

## 2025-03-12 NOTE — GROUP NOTE
Group Therapy Note    Date: 3/12/2025    Group Start Time: 1600  Group End Time: 1645  Group Topic: Group Documentation    [facility]    Pawan Velásquez    Type of Group: Healthy Living/Wellness    Module Name:  Healthy Relationships    Patient's Goal:  Discover if they are in a healthy or unhealthy relationship    Notes:  Pt able to process group topic    Status After Intervention:  Improved    Participation Level: Active Listener    Participation Quality: Appropriate, Attentive, Sharing, and Supportive      Speech:  normal      Thought Process/Content: Logical      Affective Functioning: Congruent      Mood: euthymic      Level of consciousness:  Alert      Response to Learning: Able to verbalize current knowledge/experience      Endings: None Reported    Modes of Intervention: Education, Support, and Problem-solving      Discipline Responsible: Behavorial Health Tech      Signature:  Pawan Velásquez    Group Therapy Note     Quite, good listener, but did not participate in group discussion.

## 2025-03-12 NOTE — BH NOTE
Patient denies suicidal ideation, homicidal ideations and AVH.  Patient denies anxiety and depression. Patient denies racing thoughts and paranoia. Presents flat,  calm and cooperative during assessment.  Patient is isolative to her room this evening. Sleeping in bed upon approach.  Medications taken without issue.  No complaints or concerns verbalized at this time.  No unit problems reported.  Will continue to observe and support.

## 2025-03-13 VITALS
TEMPERATURE: 96.8 F | SYSTOLIC BLOOD PRESSURE: 133 MMHG | OXYGEN SATURATION: 99 % | HEIGHT: 68 IN | DIASTOLIC BLOOD PRESSURE: 104 MMHG | RESPIRATION RATE: 18 BRPM | HEART RATE: 83 BPM | WEIGHT: 239 LBS | BODY MASS INDEX: 36.22 KG/M2

## 2025-03-13 LAB
DATE LAST DOSE: ABNORMAL
TME LAST DOSE: ABNORMAL H
VALPROATE SERPL-MCNC: 19 UG/ML (ref 50–100)
VANCOMYCIN DOSE: ABNORMAL MG

## 2025-03-13 PROCEDURE — 6370000000 HC RX 637 (ALT 250 FOR IP): Performed by: PSYCHIATRY & NEUROLOGY

## 2025-03-13 PROCEDURE — 6370000000 HC RX 637 (ALT 250 FOR IP)

## 2025-03-13 PROCEDURE — 80164 ASSAY DIPROPYLACETIC ACD TOT: CPT

## 2025-03-13 PROCEDURE — 36415 COLL VENOUS BLD VENIPUNCTURE: CPT

## 2025-03-13 RX ORDER — OLANZAPINE 10 MG/1
10 TABLET ORAL NIGHTLY
Status: DISCONTINUED | OUTPATIENT
Start: 2025-03-13 | End: 2025-03-13 | Stop reason: HOSPADM

## 2025-03-13 RX ORDER — DIVALPROEX SODIUM 250 MG/1
250 TABLET, DELAYED RELEASE ORAL EVERY 12 HOURS SCHEDULED
Qty: 60 TABLET | Refills: 0 | Status: SHIPPED | OUTPATIENT
Start: 2025-03-13 | End: 2025-04-12

## 2025-03-13 RX ORDER — ACAMPROSATE CALCIUM 333 MG/1
666 TABLET, DELAYED RELEASE ORAL 3 TIMES DAILY
Qty: 180 TABLET | Refills: 0 | Status: SHIPPED | OUTPATIENT
Start: 2025-03-13 | End: 2025-04-12

## 2025-03-13 RX ORDER — OLANZAPINE 10 MG/1
10 TABLET ORAL NIGHTLY
Qty: 30 TABLET | Refills: 0 | Status: SHIPPED | OUTPATIENT
Start: 2025-03-13 | End: 2025-04-12

## 2025-03-13 RX ADMIN — MULTIVITAMIN TABLET 1 TABLET: TABLET at 08:48

## 2025-03-13 RX ADMIN — PANTOPRAZOLE SODIUM 40 MG: 40 TABLET, DELAYED RELEASE ORAL at 06:53

## 2025-03-13 RX ADMIN — FOLIC ACID 1 MG: 1 TABLET ORAL at 08:48

## 2025-03-13 RX ADMIN — DIVALPROEX SODIUM 250 MG: 250 TABLET, DELAYED RELEASE ORAL at 08:48

## 2025-03-13 RX ADMIN — Medication 100 MG: at 08:48

## 2025-03-13 RX ADMIN — ACAMPROSATE CALCIUM ENTERIC-COATED 666 MG: 333 TABLET, DELAYED RELEASE ORAL at 08:48

## 2025-03-13 ASSESSMENT — PAIN SCALES - GENERAL: PAINLEVEL_OUTOF10: 0

## 2025-03-13 NOTE — PLAN OF CARE
Pt denies homicidal/suicidal thoughts. Denies hallucinations. Pt is compliant with medications and attending group therapy. Pt is calm and cooperative. Pt denies anxiety and depression. Will continue to monitor and provide support.   Problem: Self Harm/Suicidality  Goal: Will have no self-injury during hospital stay  Description: INTERVENTIONS:  1.  Ensure constant observer at bedside with Q15M safety checks  2.  Maintain a safe environment  3.  Secure patient belongings  4.  Ensure family/visitors adhere to safety recommendations  5.  Ensure safety tray has been added to patient's diet order  6.  Every shift and PRN: Re-assess suicidal risk via Frequent Screener    3/12/2025 2159 by Caterina Ravi, RN  Outcome: Progressing     Problem: Anxiety  Goal: Will report anxiety at manageable levels  Description: INTERVENTIONS:  1. Administer medication as ordered  2. Teach and rehearse alternative coping skills  3. Provide emotional support with 1:1 interaction with staff  3/12/2025 2159 by Caterina Ravi, RN  Outcome: Progressing     Problem: Involuntary Admit  Goal: Will cooperate with staff recommendations and doctor's orders and will demonstrate appropriate behavior  Description: INTERVENTIONS:  1. Treat underlying conditions and offer medication as ordered  2. Educate regarding involuntary admission procedures and rules  3. Contain excessive/inappropriate behavior per unit and hospital policies  Outcome: Progressing     Problem: Self Care Deficit  Goal: Return ADL status to a safe level of function  Description: INTERVENTIONS:  1. Administer medication as ordered  2. Assess ADL deficits and provide assistive devices as needed  3. Obtain PT/OT consults as needed  4. Assist and instruct patient to increase activity and self care as tolerated  Outcome: Progressing     Problem: Spiritual Care  Goal: Pt/Family able to move forward in process of forgiving self, others, and/or higher power  Description:  (0) independent

## 2025-03-13 NOTE — TRANSITION OF CARE
Behavioral Health Transition Record    Patient Name: Caterina Oliva  YOB: 1975   Medical Record Number: [id number]  Date of Admission: 3/8/2025  2:28 PM   Date of Discharge: 3/13/2025    Attending Provider: Latricia Wills MD   Discharging Provider: Dr Wills  To contact this individual call [phone] and ask the  to page.  If unavailable, ask to be transferred to Behavioral Health Provider on call.  A Behavioral Health Provider will be available on call 24/7 and during holidays.    Primary Care Provider: Milagro Lentz MD    Allergies   Allergen Reactions    No Known Allergies        Reason for Admission: Patient name: Caterina Oliva  Patient's past mental health and addiction history: Bipolar disorder, Alcohol use disorder  Patient's presentation to the ED and why the patient needs admission: Suicide attempt while intoxicated  Legal status:  []  Voluntary  [x]  Involuntary  []  Probate  Triggering/precipitating events: Binge drinking episode  Duration of triggering/precipitating events: Just prior to arrival    Admission Diagnosis: Suicide ideation [R45.851]  Bipolar 1 disorder (HCC) [F31.9]  Acute alcoholic intoxication without complication [F10.920]  Evaluation by psychiatric service required [Z00.8]    * No surgery found *    Results for orders placed or performed during the hospital encounter of 03/08/25   CBC with Auto Differential   Result Value Ref Range    WBC 6.4 4.5 - 11.5 k/uL    RBC 4.44 3.50 - 5.50 m/uL    Hemoglobin 14.3 11.5 - 15.5 g/dL    Hematocrit 39.6 34.0 - 48.0 %    MCV 89.2 80.0 - 99.9 fL    MCH 32.2 26.0 - 35.0 pg    MCHC 36.1 (H) 32.0 - 34.5 g/dL    RDW 11.8 11.5 - 15.0 %    Platelets 219 130 - 450 k/uL    MPV 9.9 7.0 - 12.0 fL    Neutrophils % 54 43.0 - 80.0 %    Lymphocytes % 38 20.0 - 42.0 %    Monocytes % 6 2.0 - 12.0 %    Eosinophils % 1 0 - 6 %    Basophils % 1 0.0 - 2.0 %    Immature Granulocytes % 0 0.0 - 5.0 %    Neutrophils Absolute 3.43 1.80 - 7.30

## 2025-03-13 NOTE — PLAN OF CARE
Problem: Self Harm/Suicidality  Goal: Will have no self-injury during hospital stay  Description: INTERVENTIONS:  1.  Ensure constant observer at bedside with Q15M safety checks  2.  Maintain a safe environment  3.  Secure patient belongings  4.  Ensure family/visitors adhere to safety recommendations  5.  Ensure safety tray has been added to patient's diet order  6.  Every shift and PRN: Re-assess suicidal risk via Frequent Screener    3/12/2025 2159 by Caterina Ravi, RN  Outcome: Progressing     Problem: Anxiety  Goal: Will report anxiety at manageable levels  Description: INTERVENTIONS:  1. Administer medication as ordered  2. Teach and rehearse alternative coping skills  3. Provide emotional support with 1:1 interaction with staff  3/12/2025 2159 by Caterina Ravi, RN  Outcome: Progressing   Pt states no suicidal ideations, homicidal ideations, hallucinations. Pt attended 1 group,  is taking medications. Pt is eating well. Patient is social with peers and is out in the day area. Pt. is cooperative.

## 2025-03-13 NOTE — GROUP NOTE
Group Therapy Note    Date: 3/13/2025    Group Start Time: 0945  Group End Time: 1030  Group Topic: Recreational    [facility]    Scarlet Girard LISW        Group Therapy Note    Attendees: 7       Patient's Goal:  Pt attended recreational group, where we listened to music and created vision boards.    Notes:  Pt was an active participant in group.  She picked appropriate songs to play and created a beautiful vision board.    Status After Intervention:  Improved    Participation Level: Active Listener and Interactive    Participation Quality: Appropriate, Attentive, Sharing, and Supportive      Speech:  normal      Thought Process/Content: Logical      Affective Functioning: Congruent      Mood: euthymic      Level of consciousness:  Alert, Oriented x4, and Attentive      Response to Learning: Able to verbalize current knowledge/experience, Able to verbalize/acknowledge new learning, Able to retain information, Capable of insight, and Able to change behavior      Endings: None Reported    Modes of Intervention: Education, Support, Socialization, Exploration, and Activity      Discipline Responsible: /Counselor      Signature:  JALYN Lucas

## 2025-03-13 NOTE — GROUP NOTE
Group Therapy Note    Date: 3/13/2025    Group Start Time: 0930  Group End Time: 0945  Group Topic: Community Meeting    [facility]    Scarlet Girard LISW        Group Therapy Note    Attendees: 8       Patient's Goal:  Pt attended community meeting where we discussed unit rules and expectations.    Notes:  Pt identified their daily goal as, \"reading and get back to my basics.\"    Status After Intervention:  Improved    Participation Level: Active Listener and Interactive    Participation Quality: Appropriate, Attentive, Sharing, and Supportive      Speech:  normal      Thought Process/Content: Logical      Affective Functioning: Congruent      Mood: euthymic      Level of consciousness:  Alert, Oriented x4, and Attentive      Response to Learning: Able to verbalize current knowledge/experience, Able to verbalize/acknowledge new learning, Able to retain information, Capable of insight, and Able to change behavior      Endings: None Reported    Modes of Intervention: Education, Support, Socialization, Exploration, Clarifying, and Problem-solving      Discipline Responsible: /Counselor      Signature:  JALYN Lucas

## 2025-03-13 NOTE — BH NOTE
Behavioral [facility] [place]  Discharge Note    Pt discharged with followings belongings:   Dental Appliances: None  Vision - Corrective Lenses: Eyeglasses  Hearing Aid: None  Jewelry: None  Body Piercings Removed: N/A  Clothing: Footwear, Pajamas  Other Valuables: Other (Comment) (blanket)   Valuables sent home with or returned to patient. Patient educated on aftercare instructions: yes  Information faxed to n/a by n/a  at 12:35 PM .Patient verbalize understanding of AVS:  yes.    Status EXAM upon discharge:  Mental Status and Behavioral Exam  Normal: No  Level of Assistance: Independent/Self  Facial Expression: Brightened  Affect: Appropriate  Level of Consciousness: Alert  Frequency of Checks: 4 times per hour, close  Mood:Normal: No  Mood: Anxious  Motor Activity:Normal: Yes  Motor Activity: Other (comment)  Eye Contact: Good  Observed Behavior: Cooperative, Friendly  Sexual Misconduct History: Current - no  Preception: [place] to person, [place] to time, [place] to place, [place] to situation  Attention:Normal: Yes  Thought Processes: Unremarkable  Thought Content:Normal: Yes  Depression Symptoms: No problems reported or observed.  Anxiety Symptoms: Generalized  Marleen Symptoms: No problems reported or observed.  Hallucinations: None  Delusions: No  Memory:Normal: Yes  Memory: Poor recent, Poor remote  Insight and Judgment: No  Insight and Judgment: Poor judgment, Poor insight    Tobacco Screening:  Practical Counseling, on admission, brock X, if applicable and completed (first 3 are required if patient doesn't refuse):            ( ) Recognizing danger situations (included triggers and roadblocks)                    ( ) Coping skills (new ways to manage stress,relaxation techniques, changing routine, distraction)                                                           ( ) Basic information about quitting (benefits of quitting, techniques in how to quit, available resources  ( ) Referral for counseling faxed to

## 2025-03-13 NOTE — DISCHARGE SUMMARY
at 03/09/25 0756    acetaminophen (TYLENOL) tablet 650 mg, 650 mg, Oral, Q4H PRN, Latricia Wills MD, 650 mg at 03/11/25 2123    magnesium hydroxide (MILK OF MAGNESIA) 400 MG/5ML suspension 30 mL, 30 mL, Oral, Daily PRN, Latricia Wills MD    aluminum & magnesium hydroxide-simethicone (MAALOX PLUS) 200-200-20 MG/5ML suspension 30 mL, 30 mL, Oral, PRN, Latricia Wills MD    hydrOXYzine HCl (ATARAX) tablet 25 mg, 25 mg, Oral, TID PRN, Latricia Wills MD    haloperidol (HALDOL) tablet 3 mg, 3 mg, Oral, Q6H PRN **OR** haloperidol lactate (HALDOL) injection 3 mg, 3 mg, IntraMUSCular, Q6H PRN, Latricia Wills MD    melatonin tablet 3 mg, 3 mg, Oral, Nightly PRN, Latricia Wills MD, 3 mg at 03/12/25 2039    divalproex (DEPAKOTE) DR tablet 250 mg, 250 mg, Oral, 2 times per day, Latricia Wills MD, 250 mg at 03/13/25 0848    influenza split vaccine (PF) (AFLURIA;FLUARIX) injection 0.5 mL, 1 Dose, IntraMUSCular, Prior to discharge, Latricia Wills MD    loperamide (IMODIUM) capsule 2 mg, 2 mg, Oral, TID PRN, Holly Salgado APRN - CNP, 2 mg at 03/11/25 0818    pantoprazole (PROTONIX) tablet 40 mg, 40 mg, Oral, QAM AC, Holly Salgado APRN - CNP, 40 mg at 03/13/25 0653    folic acid (FOLVITE) tablet 1 mg, 1 mg, Oral, Daily, Holly Salgado APRN - CNP, 1 mg at 03/13/25 0848    multivitamin 1 tablet, 1 tablet, Oral, Daily, Holly Salgado APRN - CNP, 1 tablet at 03/13/25 0848    thiamine tablet 100 mg, 100 mg, Oral, Daily, Naomi Holly M, APRN - CNP, 100 mg at 03/13/25 0848    sodium chloride flush 0.9 % injection 5-40 mL, 5-40 mL, IntraVENous, PRN, Nati Henderson MD    0.9 % sodium chloride infusion, , IntraVENous, PRN, Nati Henderson MD    Examination:  BP (!) 133/104   Pulse 83   Temp 96.8 °F (36 °C) (Temporal)   Resp 18   Ht 1.721 m (5' 7.76\")   Wt 108.4 kg (239 lb)   LMP 04/01/2008   SpO2 99%   BMI 36.59 kg/m²   Gait - steady    HOSPITAL COURSE::   Patient was admitted to the unit

## 2025-03-13 NOTE — CARE COORDINATION
NOHEMI met with pt to discuss discharge for today. Pt is alert and oriented x4. Pt's mood is good, affect is congruent. Pt's speech is clear, rate and volume is appropriate. Pt's insight and judgement is good. Pt denies  depression and anxiety. Pt denies SI, HI, AVH. Pt reported that she will be discharging to return to her home and her mother will be picking her up from the hospital. Pt stated that she is looking forward to holding her emotional support dog, Laureen. Pt reported to feeling \"ready\" today. Pt was very appreciative of the support received here and states she will continue to read when discharged instead of drinking wine.    SW called pt's friend, Maria Eugenia, to discuss discharge planning.  She did not answer so a voicemail was left.    Pt has follow up appointments scheduled at her PCP, Dr. Henok Humphrey for mental health medication management on 3/18 at 9:15 am. Pt has a mental health counseling appointment on 3/24 at 3:15 pm with [facility].    In order to ensure appropriate transition and discharge planning is in place, the following documents have been transmitted to here PCP and [facility], as the new outpatient provider:    The d/c diagnosis was transmitted to the next care provider  The reason for hospitalization was transmitted to the next care provider  The d/c medications (dosage and indication) were transmitted to the next care provider   The continuing care plan was transmitted to the next care provider

## 2025-03-13 NOTE — PROGRESS NOTES
BEHAVIORAL HEALTH FOLLOW-UP NOTE     3/11/2025     Patient was seen and examined in person, Chart reviewed   Patient's case discussed with staff/team    Chief Complaint: Suicide attempt while intoxicated patient reportedly went into the garage with car running    Interim History:   Patient was seen in her room she is lying in bed she has a covers over her head she states she feeling a little nauseous this morning she denies alcohol withdrawal symptoms at this time patient's last CIWA was a 1.  She is flat, cooperative does states that she is tired and nauseous right now and that is why she is not offering much.  She does denies suicidal ideation, denies homicidal ideation she denies auditory visual hallucinations.  She states she did talk to peer recovery she states that she is agreeable for counseling on discharge stating that she knows she needs it.  Patient was agreeable for Campral which has been added.  She has been taking her medication, she has had no behavioral disturbances on the unit, she is social and attending group.  Sleep and appetite reported to be fair    Appetite: [x] Normal/Unchanged  [] Increased  [] Decreased      Sleep:       [x] Normal/Unchanged  [] Fair       [] Poor              Energy:    [x] Normal/Unchanged  [] Increased  [] Decreased        SI [] Present  [x] Absent    HI  []Present  [x] Absent     Aggression:  [] yes  [x] no    Patient is [x] able  [] unable to CONTRACT FOR SAFETY     PAST MEDICAL/PSYCHIATRIC HISTORY:   Past Medical History:   Diagnosis Date    Cerebral artery occlusion with cerebral infarction (HCC)     Diverticulosis     GERD without esophagitis     Irritable bowel syndrome     Knee pain     Morbid obesity due to excess calories     Obesity     Sleep apnea     CPAP setting 11       FAMILY/SOCIAL HISTORY:  Family History   Problem Relation Age of Onset    Heart Attack Mother      Social History     Socioeconomic History    Marital status: Single     Spouse name: Not on 
[facility]  Initial Interdisciplinary Treatment Plan Note      Original treatment plan Date & Time: 03/10/2025 0900    Admission Type:  Admission Type: Involuntary    Reason for admission:   Reason for Admission: I drank to much and then I got into the car and turned it on    Estimated Length of Stay:  5-7days  Estimated Discharge Date: To be determined by physician.    PATIENT STRENGTHS:  Patient Strengths:   Patient Strengths and Limitations:   Addictive Behavior: Addictive Behavior  In the Past 3 Months, Have You Felt or Has Someone Told You That You Have a Problem With  : None  Medical Problems:  Past Medical History:   Diagnosis Date    Cerebral artery occlusion with cerebral infarction (HCC)     Diverticulosis     GERD without esophagitis     Irritable bowel syndrome     Knee pain     Morbid obesity due to excess calories     Obesity     Sleep apnea     CPAP setting 11     Status EXAM:Mental Status and Behavioral Exam  Normal: No  Level of Assistance: Independent/Self  Facial Expression: Sad  Affect: Congruent  Level of Consciousness: Alert  Frequency of Checks: 4 times per hour, close  Mood:Normal: No  Mood: Sad, Depressed  Motor Activity:Normal: Yes  Motor Activity: Other (comment) (normal)  Eye Contact: Good  Observed Behavior: Cooperative  Sexual Misconduct History: Current - no  Preception: [place] to person, [place] to time, [place] to place, [place] to situation  Attention:Normal: Yes  Thought Processes: Unremarkable  Thought Content:Normal: Yes  Depression Symptoms: Crying  Anxiety Symptoms: Generalized  Marleen Symptoms: No problems reported or observed.  Hallucinations: None  Delusions: No  Memory:Normal: No  Memory: Poor recent  Insight and Judgment: No  Insight and Judgment: Poor insight, Poor judgment    EDUCATION:   Learner Progress Toward Treatment Goals: Will review group plans and strategies for care.    Method: Group therapy, Medication compliance, Individualized assessments and 
CLINICAL PHARMACY NOTE: MEDS TO BEDS    Total # of Prescriptions Filled: 3   The following medications were delivered to the patient:  DIVALPROEX  MG  OLANZAPINE 10 MG  ACAMPROSATE 333 MG    Additional Documentation:   DELIVERED TO RN    
Leisure assessment completed.    03/10/25 1421   Activities of Daily Living   Patient Requires assistance with daily self-care activities? No   Leisure Activity 1   3 Favorite Leisure Activities have a services dog, 2 cats ride my motorcycle water aeroibics   Frequency > 2 hours/day   Last time this month   Barriers to participating  motivation;social (ie. no one to do it with)   Leisure Activity 2   Favorite Leisure Activities  same   Leisure Activity 3   Favorite Leisure Activities  same   Social   Patient reports spending the majority of their free time alone   Patient verbalizes a preference for spending free time alone   Patient’s perception of support system more healthy   Patient’s perception of barriers to socializing with others include(s) lack of motivation/interest;lack of comfort   Beliefs & Coping   Has difficulty dealing with feelings   Yes   Internalizes feelings/Keeps feelings in Yes   Externalizes feelings through aggressiveness or poor temper control  No   Feels uncomfortable around others  No   Has difficulty talking to others  No   Depends on others for direction or decisions No   Difficulty dealing with anger of others  No   Difficulty dealing with own anger  No   Difficulty managing stress Yes   Frequently has difficulty with relationships  No   Has recently perceived/experienced loss, disappointment, humiliation or failure  NO   General perception about self likes self   Attitude about abilities more successful than not   Locus of Control  most of the time   Belief about recovery Recovery is possible   Patient Identified Strengths  think Im a hard worker and compassionate   Patient Identified Limitations  drinking issues   Perception of most stressful event prior to hospitalization drank too much   Perception of changes needed a counseling apt   Strengths and Limitations   Strengths Independent in basic self-care activities;Positive support network;Realistic perception of 
Patient resting quietly in bed with eyes closed. Respirations even and unlabored with no signs of distress observed. Environmental rounds continued.  
Pt participated in Wrap-up Goals. Pt stated daily goal for tomorrow was to go to groups and get information on a peer group.  
recent  Insight and Judgment: No  Insight and Judgment: Poor insight, Poor judgment    Daily Assessment Last Entry:   Daily Sleep (WDL): Within Defined Limits            Daily Nutrition (WDL): Within Defined Limits  Level of Assistance: Independent/Self    Patient Monitoring:  Frequency of Checks: 4 times per hour, close    Psychiatric Symptoms:   Depression Symptoms  Depression Symptoms: Change in energy level  Anxiety Symptoms  Anxiety Symptoms: No problems reported or observed.  Marleen Symptoms  Marleen Symptoms: No problems reported or observed.          Suicide Risk CSSR-S:  1) Within the past month, have you wished you were dead or wished you could go to sleep and not wake up? : Yes  2) Have you actually had any thoughts of killing yourself? : Yes  3) Have you been thinking about how you might kill yourself? : Yes  5) Have you started to work out or worked out the details of how to kill yourself? Do you intend to carry out this plan? : Yes  6) Have you ever done anything, started to do anything, or prepared to do anything to end your life?: Yes  Change in Result no Change in Plan of care no      EDUCATION:   EDUCATION:   Learner Progress Toward Treatment Goals: Reviewed results and recommendations of this team, Reviewed group plan and strategies, Reviewed signs, symptoms and risk of self harm and violent behavior, Reviewed goals and plan of care    Method:small group, individual verbal education    Outcome:verbalized by patient, but needs reinforcement to obtain goals    PATIENT GOALS:  Short term:None at this time  Long term:None at this time    PLAN/TREATMENT RECOMMENDATIONS UPDATE: continue with group therapies, increased socialization, continue planning for after discharge goals, continue with medication compliance    SHORT-TERM GOALS UPDATE:   Time frame for Short-Term Goals: 5-7 days    LONG-TERM GOALS UPDATE:   Time frame for Long-Term Goals: 6 months  Members Present in Team Meeting: See Signature 
tablet 800 mg, 800 mg, Oral, Q8H PRN, Armando Esparza, DO    OLANZapine (ZYPREXA) tablet 5 mg, 5 mg, Oral, Nightly, Holly Salgado, APRN - CNP, 5 mg at 03/11/25 2122    acamprosate (CAMPRAL) tablet 333 mg, 333 mg, Oral, TID, Holly Salgado, APRN - CNP, 333 mg at 03/12/25 0843    ondansetron (ZOFRAN-ODT) disintegrating tablet 4 mg, 4 mg, Oral, Q8H PRN, Armando Esparza, DO, 4 mg at 03/09/25 0756    acetaminophen (TYLENOL) tablet 650 mg, 650 mg, Oral, Q4H PRN, Latricia Wills MD, 650 mg at 03/11/25 2123    magnesium hydroxide (MILK OF MAGNESIA) 400 MG/5ML suspension 30 mL, 30 mL, Oral, Daily PRN, Latricia Wills MD    aluminum & magnesium hydroxide-simethicone (MAALOX PLUS) 200-200-20 MG/5ML suspension 30 mL, 30 mL, Oral, PRN, Latricia Wills MD    hydrOXYzine HCl (ATARAX) tablet 25 mg, 25 mg, Oral, TID PRN, Latricia Wills MD    haloperidol (HALDOL) tablet 3 mg, 3 mg, Oral, Q6H PRN **OR** haloperidol lactate (HALDOL) injection 3 mg, 3 mg, IntraMUSCular, Q6H PRN, Latricia Wills MD    melatonin tablet 3 mg, 3 mg, Oral, Nightly PRN, Latricia Wills MD, 3 mg at 03/09/25 2031    divalproex (DEPAKOTE) DR tablet 250 mg, 250 mg, Oral, 2 times per day, Latricia Wills MD, 250 mg at 03/12/25 0843    influenza split vaccine (PF) (AFLURIA;FLUARIX) injection 0.5 mL, 1 Dose, IntraMUSCular, Prior to discharge, Latricia Wills MD    loperamide (IMODIUM) capsule 2 mg, 2 mg, Oral, TID PRN, Holly Salgado APRN - CNP, 2 mg at 03/11/25 0818    pantoprazole (PROTONIX) tablet 40 mg, 40 mg, Oral, QAM AC, Holly Salgado, APRN - CNP, 40 mg at 03/12/25 0654    folic acid (FOLVITE) tablet 1 mg, 1 mg, Oral, Daily, Holly Salgado APRN - CNP, 1 mg at 03/12/25 0843    multivitamin 1 tablet, 1 tablet, Oral, Daily, Holly Salgado APRN - CNP, 1 tablet at 03/12/25 0843    thiamine tablet 100 mg, 100 mg, Oral, Daily, Holly Salgado APRN - CNP, 100 mg at 03/12/25 0843    sodium chloride flush 0.9 %

## 2025-03-18 ENCOUNTER — OFFICE VISIT (OUTPATIENT)
Dept: PRIMARY CARE CLINIC | Age: 50
End: 2025-03-18
Payer: COMMERCIAL

## 2025-03-18 ENCOUNTER — TELEPHONE (OUTPATIENT)
Dept: PRIMARY CARE CLINIC | Age: 50
End: 2025-03-18

## 2025-03-18 VITALS
DIASTOLIC BLOOD PRESSURE: 62 MMHG | HEART RATE: 70 BPM | SYSTOLIC BLOOD PRESSURE: 108 MMHG | OXYGEN SATURATION: 98 % | HEIGHT: 68 IN | BODY MASS INDEX: 37.74 KG/M2 | TEMPERATURE: 97.8 F | WEIGHT: 249 LBS

## 2025-03-18 DIAGNOSIS — L98.7 EXCESS SKIN: Primary | ICD-10-CM

## 2025-03-18 DIAGNOSIS — B36.9 FUNGAL DERMATITIS: ICD-10-CM

## 2025-03-18 DIAGNOSIS — E66.812 CLASS 2 OBESITY DUE TO EXCESS CALORIES WITHOUT SERIOUS COMORBIDITY WITH BODY MASS INDEX (BMI) OF 37.0 TO 37.9 IN ADULT: ICD-10-CM

## 2025-03-18 DIAGNOSIS — F31.63 BIPOLAR DISORDER, CURRENT EPISODE MIXED, SEVERE, WITHOUT PSYCHOTIC FEATURES (HCC): Primary | ICD-10-CM

## 2025-03-18 DIAGNOSIS — Z78.9 ALCOHOL USE: ICD-10-CM

## 2025-03-18 DIAGNOSIS — E66.09 CLASS 2 OBESITY DUE TO EXCESS CALORIES WITHOUT SERIOUS COMORBIDITY WITH BODY MASS INDEX (BMI) OF 37.0 TO 37.9 IN ADULT: ICD-10-CM

## 2025-03-18 PROCEDURE — 3078F DIAST BP <80 MM HG: CPT | Performed by: INTERNAL MEDICINE

## 2025-03-18 PROCEDURE — 3074F SYST BP LT 130 MM HG: CPT | Performed by: INTERNAL MEDICINE

## 2025-03-18 PROCEDURE — 99214 OFFICE O/P EST MOD 30 MIN: CPT | Performed by: INTERNAL MEDICINE

## 2025-03-18 RX ORDER — CLOTRIMAZOLE 1 %
CREAM (GRAM) TOPICAL
Qty: 60 G | Refills: 1 | Status: SHIPPED | OUTPATIENT
Start: 2025-03-18 | End: 2025-03-25

## 2025-03-18 ASSESSMENT — ENCOUNTER SYMPTOMS
NAUSEA: 0
VOMITING: 0
COUGH: 0
DIARRHEA: 0
ABDOMINAL PAIN: 0
SHORTNESS OF BREATH: 0

## 2025-03-18 NOTE — PROGRESS NOTES
SUBJECTIVE  Caterina Oliva is a 49 y.o. female established was seen In the office  for evaluation.    HPI/Chief C/O:  Chief Complaint   Patient presents with    Follow-Up from Hospital     Had mammogram last year but doesn't know where   Was in hospital in psych. Bipolar started on zyprexa,depakote and campral   Allergies   Allergen Reactions    No Known Allergies        ROS:  Review of Systems   Respiratory:  Negative for cough and shortness of breath.         Negative for Hemoptysis   Cardiovascular:  Negative for chest pain.   Gastrointestinal:  Negative for abdominal pain, diarrhea, nausea and vomiting.   Endocrine: Negative for polydipsia, polyphagia and polyuria.   Genitourinary:  Negative for dysuria and hematuria.   Skin:  Negative for rash.   Neurological:  Negative for tremors and seizures.        Past Medical/Surgical Hx;  Reviewed with patient      Diagnosis Date    Cerebral artery occlusion with cerebral infarction (HCC)     Diverticulosis     GERD without esophagitis     Irritable bowel syndrome     Knee pain     Morbid obesity due to excess calories     Obesity     Sleep apnea     CPAP setting 11     Past Surgical History:   Procedure Laterality Date    CHOLECYSTECTOMY, LAPAROSCOPIC N/A 5/12/2024    LAPAROSCOPIC CHOLECYSTECTOMY WITH INTRAOPERATIVE CHOLANGIOGRAM performed by Freddie Esqueda MD at [facility] OR    COLONOSCOPY  2013    COLONOSCOPY N/A 01/25/2019    COLONOSCOPY DIAGNOSTIC performed by Woodrow Ortiz MD at [facility] ENDOSCOPY    COLONOSCOPY  11/04/2019    HYSTERECTOMY (CERVIX STATUS UNKNOWN)      JOINT REPLACEMENT Right     R knee 2021       Past Family Hx:  Reviewed with patient      Problem Relation Age of Onset    Heart Attack Mother        Social Hx:  Reviewed with patient  Social History     Tobacco Use    Smoking status: Never    Smokeless tobacco: Never   Substance Use Topics    Alcohol use: Yes     Alcohol/week: 1.0 standard drink of alcohol     Types: 1 Glasses of wine per week

## 2025-03-18 NOTE — TELEPHONE ENCOUNTER
Patient called in and asked where the referral was for the plastic surgeon to remove her excess skin? She stated she spoke to the doctor about the referral during her visit. I checked her chart and could not find a referral. Patient would like a call back with the contact information when this referral is made.

## 2025-04-07 ENCOUNTER — OFFICE VISIT (OUTPATIENT)
Dept: PRIMARY CARE CLINIC | Age: 50
End: 2025-04-07
Payer: COMMERCIAL

## 2025-04-07 VITALS
TEMPERATURE: 98 F | RESPIRATION RATE: 16 BRPM | WEIGHT: 256 LBS | DIASTOLIC BLOOD PRESSURE: 70 MMHG | BODY MASS INDEX: 38.8 KG/M2 | HEIGHT: 68 IN | OXYGEN SATURATION: 97 % | SYSTOLIC BLOOD PRESSURE: 110 MMHG | HEART RATE: 88 BPM

## 2025-04-07 DIAGNOSIS — F31.63 BIPOLAR DISORDER, CURRENT EPISODE MIXED, SEVERE, WITHOUT PSYCHOTIC FEATURES (HCC): Primary | ICD-10-CM

## 2025-04-07 DIAGNOSIS — E66.812 CLASS 2 OBESITY DUE TO EXCESS CALORIES WITHOUT SERIOUS COMORBIDITY WITH BODY MASS INDEX (BMI) OF 37.0 TO 37.9 IN ADULT: ICD-10-CM

## 2025-04-07 DIAGNOSIS — F10.90 ALCOHOL USE: ICD-10-CM

## 2025-04-07 DIAGNOSIS — E66.09 CLASS 2 OBESITY DUE TO EXCESS CALORIES WITHOUT SERIOUS COMORBIDITY WITH BODY MASS INDEX (BMI) OF 37.0 TO 37.9 IN ADULT: ICD-10-CM

## 2025-04-07 PROCEDURE — 3078F DIAST BP <80 MM HG: CPT | Performed by: INTERNAL MEDICINE

## 2025-04-07 PROCEDURE — 3074F SYST BP LT 130 MM HG: CPT | Performed by: INTERNAL MEDICINE

## 2025-04-07 PROCEDURE — 99213 OFFICE O/P EST LOW 20 MIN: CPT | Performed by: INTERNAL MEDICINE

## 2025-04-07 ASSESSMENT — ENCOUNTER SYMPTOMS
SHORTNESS OF BREATH: 0
VOMITING: 0
COUGH: 0
ABDOMINAL PAIN: 0
NAUSEA: 0
DIARRHEA: 0

## 2025-04-07 NOTE — PROGRESS NOTES
taking: Reported on 4/7/2025) 60 tablet 0    OLANZapine (ZYPREXA) 10 MG tablet Take 1 tablet by mouth nightly (Patient not taking: Reported on 4/7/2025) 30 tablet 0     No facility-administered encounter medications on file as of 4/7/2025.       No follow-ups on file.        Reviewed recent labs related to Caterina's current problems      Discussed importance of regular Health Maintenance follow up  Health Maintenance   Topic    HIV screen     Hepatitis C screen     Hepatitis B vaccine (1 of 3 - 19+ 3-dose series)    Breast cancer screen     Shingles vaccine (1 of 2)    Pneumococcal 50+ years Vaccine (1 of 1 - PCV)    COVID-19 Vaccine (3 - 2024-25 season)    DTaP/Tdap/Td vaccine (2 - Td or Tdap)    Flu vaccine (Season Ended)    Depression Monitoring     Colorectal Cancer Screen     Lipids     Hepatitis A vaccine     Hib vaccine     Polio vaccine     Meningococcal (ACWY) vaccine     Meningococcal B vaccine     Depression Screen     Diabetes screen

## 2025-04-21 RX ORDER — ACAMPROSATE CALCIUM 333 MG/1
666 TABLET, DELAYED RELEASE ORAL 3 TIMES DAILY
Qty: 180 TABLET | Refills: 0 | OUTPATIENT
Start: 2025-04-21 | End: 2025-05-21

## 2025-04-21 NOTE — TELEPHONE ENCOUNTER
Patient called in requesting refill on    acamprosate (CAMPRAL) 333 MG tablet     [id number] - [address]     Patient would like a 90 day supply of this medication and she is completely out of it.

## 2025-04-22 ENCOUNTER — TELEPHONE (OUTPATIENT)
Dept: PRIMARY CARE CLINIC | Age: 50
End: 2025-04-22

## 2025-04-22 RX ORDER — ACAMPROSATE CALCIUM 333 MG/1
666 TABLET, DELAYED RELEASE ORAL 3 TIMES DAILY
Qty: 180 TABLET | Refills: 0 | Status: SHIPPED | OUTPATIENT
Start: 2025-04-22 | End: 2025-05-22

## 2025-04-22 NOTE — TELEPHONE ENCOUNTER
Patient called back in regarding the acamprosate (CAMPRAL) 333 MG tablet. She said Dr. Henok Cruz has prescribed it in the past but when she was in behavioral health, that doctor prescribed it. She no longer sees behavioral health so she will need Dr.El Cruz to send in a new script in order for her to get this medication.     Patient wants a call back regardless of what the doctor says so she knows what's going on.

## 2025-05-07 ENCOUNTER — TELEPHONE (OUTPATIENT)
Dept: PRIMARY CARE CLINIC | Age: 50
End: 2025-05-07

## 2025-05-07 NOTE — TELEPHONE ENCOUNTER
Patient called in requesting the notes from Dr. Henok Cruz concerning the loose skin irritation and also the prescription for the cream he has prescribed to her for the irritation. States she is at Dr. Billy Reyna's office now and he is asking for this information. Please send to fax # [phone], Thank you.

## 2025-06-23 ENCOUNTER — OFFICE VISIT (OUTPATIENT)
Dept: PRIMARY CARE CLINIC | Age: 50
End: 2025-06-23
Payer: COMMERCIAL

## 2025-06-23 ENCOUNTER — RESULTS FOLLOW-UP (OUTPATIENT)
Dept: PRIMARY CARE CLINIC | Age: 50
End: 2025-06-23

## 2025-06-23 VITALS
DIASTOLIC BLOOD PRESSURE: 85 MMHG | HEIGHT: 69 IN | TEMPERATURE: 97.8 F | WEIGHT: 248 LBS | RESPIRATION RATE: 18 BRPM | OXYGEN SATURATION: 97 % | HEART RATE: 70 BPM | SYSTOLIC BLOOD PRESSURE: 125 MMHG | BODY MASS INDEX: 36.73 KG/M2

## 2025-06-23 DIAGNOSIS — Z11.59 NEED FOR HEPATITIS C SCREENING TEST: ICD-10-CM

## 2025-06-23 DIAGNOSIS — Z79.899 CURRENT USE OF PROTON PUMP INHIBITOR: ICD-10-CM

## 2025-06-23 DIAGNOSIS — E55.9 VITAMIN D DEFICIENCY: ICD-10-CM

## 2025-06-23 DIAGNOSIS — Z11.4 ENCOUNTER FOR SCREENING FOR HIV: ICD-10-CM

## 2025-06-23 DIAGNOSIS — R51.9 NONINTRACTABLE HEADACHE, UNSPECIFIED CHRONICITY PATTERN, UNSPECIFIED HEADACHE TYPE: Primary | ICD-10-CM

## 2025-06-23 DIAGNOSIS — Z01.84 IMMUNITY STATUS TESTING: ICD-10-CM

## 2025-06-23 PROBLEM — J04.0 ACUTE LARYNGITIS: Status: ACTIVE | Noted: 2025-06-23

## 2025-06-23 PROBLEM — M51.26 HERNIATED LUMBAR INTERVERTEBRAL DISC: Status: ACTIVE | Noted: 2020-02-07

## 2025-06-23 PROBLEM — J45.909 ASTHMA: Status: ACTIVE | Noted: 2021-07-07

## 2025-06-23 PROBLEM — D64.9 ANEMIA: Status: ACTIVE | Noted: 2021-08-20

## 2025-06-23 PROBLEM — Z98.890 HISTORY OF KNEE SURGERY: Status: ACTIVE | Noted: 2021-08-12

## 2025-06-23 PROBLEM — G47.31 CENTRAL SLEEP APNEA SYNDROME: Status: ACTIVE | Noted: 2025-06-23

## 2025-06-23 PROBLEM — E66.01 MORBID OBESITY (HCC): Status: ACTIVE | Noted: 2025-06-23

## 2025-06-23 PROBLEM — R53.83 FATIGUE: Status: ACTIVE | Noted: 2025-06-23

## 2025-06-23 PROBLEM — R06.00 DYSPNEA: Status: ACTIVE | Noted: 2025-06-23

## 2025-06-23 PROBLEM — M54.9 BACK PAIN: Status: RESOLVED | Noted: 2021-10-06 | Resolved: 2025-06-23

## 2025-06-23 LAB
HBV SURFACE AB TITR SER: <3.5 MIU/ML (ref 0–9.99)
HEPATITIS C ANTIBODY: NONREACTIVE
HIV AG/AB: NONREACTIVE
VITAMIN B-12: >2000 PG/ML (ref 232–1245)
VITAMIN D 25-HYDROXY: 67.9 NG/ML (ref 30–100)

## 2025-06-23 PROCEDURE — 3079F DIAST BP 80-89 MM HG: CPT | Performed by: INTERNAL MEDICINE

## 2025-06-23 PROCEDURE — 99214 OFFICE O/P EST MOD 30 MIN: CPT | Performed by: INTERNAL MEDICINE

## 2025-06-23 PROCEDURE — 3074F SYST BP LT 130 MM HG: CPT | Performed by: INTERNAL MEDICINE

## 2025-06-23 RX ORDER — MULTIVITAMIN WITH IRON
500 TABLET ORAL DAILY
COMMUNITY

## 2025-06-23 RX ORDER — OMEPRAZOLE 20 MG/1
20 CAPSULE, DELAYED RELEASE ORAL DAILY
Qty: 90 CAPSULE | Refills: 1 | Status: SHIPPED | OUTPATIENT
Start: 2025-06-23

## 2025-06-23 RX ORDER — OMEPRAZOLE 20 MG/1
20 CAPSULE, DELAYED RELEASE ORAL DAILY
COMMUNITY
End: 2025-06-23 | Stop reason: SDUPTHER

## 2025-06-23 RX ORDER — M-VIT,TX,IRON,MINS/CALC/FOLIC 27MG-0.4MG
1 TABLET ORAL DAILY
COMMUNITY

## 2025-06-23 RX ORDER — SERTRALINE HYDROCHLORIDE 100 MG/1
50 TABLET, FILM COATED ORAL DAILY
Qty: 90 TABLET | Refills: 1 | Status: SHIPPED | OUTPATIENT
Start: 2025-06-23

## 2025-06-23 SDOH — HEALTH STABILITY: PHYSICAL HEALTH: ON AVERAGE, HOW MANY MINUTES DO YOU ENGAGE IN EXERCISE AT THIS LEVEL?: 30 MIN

## 2025-06-23 SDOH — HEALTH STABILITY: PHYSICAL HEALTH: ON AVERAGE, HOW MANY DAYS PER WEEK DO YOU ENGAGE IN MODERATE TO STRENUOUS EXERCISE (LIKE A BRISK WALK)?: 2 DAYS

## 2025-06-26 NOTE — TELEPHONE ENCOUNTER
----- Message from Dr. Liseth Goss MD sent at 6/23/2025  8:23 PM EDT -----  Please let Caterina know that her VItamin B 12 is elevated.  Please see if this was prescribed because of her bypass.  She likely can decrease the amount taken from every day to 3 times weekly and we can monitor her levels. Her HIV and Hep C are negative as expected. Her Hep B s Ab is low and this means she is not immune.  We can vaccinate if she would desire.  Her Vitamin D level is normal.     Liseth Goss MD  6/23/2025

## 2025-07-09 RX ORDER — SERTRALINE HYDROCHLORIDE 100 MG/1
50 TABLET, FILM COATED ORAL DAILY
Qty: 90 TABLET | Refills: 1 | Status: SHIPPED | OUTPATIENT
Start: 2025-07-09

## 2025-07-29 ENCOUNTER — OFFICE VISIT (OUTPATIENT)
Dept: PRIMARY CARE CLINIC | Age: 50
End: 2025-07-29
Payer: COMMERCIAL

## 2025-07-29 VITALS
RESPIRATION RATE: 18 BRPM | BODY MASS INDEX: 36.29 KG/M2 | WEIGHT: 245 LBS | HEART RATE: 65 BPM | DIASTOLIC BLOOD PRESSURE: 80 MMHG | SYSTOLIC BLOOD PRESSURE: 130 MMHG | HEIGHT: 69 IN | OXYGEN SATURATION: 97 % | TEMPERATURE: 97.6 F

## 2025-07-29 DIAGNOSIS — G47.31 CENTRAL SLEEP APNEA SYNDROME: ICD-10-CM

## 2025-07-29 DIAGNOSIS — R19.7 DIARRHEA, UNSPECIFIED TYPE: Primary | ICD-10-CM

## 2025-07-29 DIAGNOSIS — Z12.31 ENCOUNTER FOR SCREENING MAMMOGRAM FOR MALIGNANT NEOPLASM OF BREAST: ICD-10-CM

## 2025-07-29 DIAGNOSIS — G47.33 OSA (OBSTRUCTIVE SLEEP APNEA): ICD-10-CM

## 2025-07-29 PROBLEM — E66.01 MORBID OBESITY WITH BMI OF 50.0-59.9, ADULT (HCC): Status: RESOLVED | Noted: 2018-09-30 | Resolved: 2025-07-29

## 2025-07-29 PROBLEM — R06.00 DYSPNEA: Status: RESOLVED | Noted: 2025-06-23 | Resolved: 2025-07-29

## 2025-07-29 PROBLEM — E87.6 HYPOKALEMIA: Status: RESOLVED | Noted: 2021-09-27 | Resolved: 2025-07-29

## 2025-07-29 PROBLEM — K81.0 ACUTE CHOLECYSTITIS: Status: RESOLVED | Noted: 2024-05-12 | Resolved: 2025-07-29

## 2025-07-29 PROBLEM — J04.0 ACUTE LARYNGITIS: Status: RESOLVED | Noted: 2025-06-23 | Resolved: 2025-07-29

## 2025-07-29 PROBLEM — F31.9 BIPOLAR 1 DISORDER (HCC): Status: RESOLVED | Noted: 2025-03-09 | Resolved: 2025-07-29

## 2025-07-29 PROBLEM — K57.92 ACUTE DIVERTICULITIS: Status: RESOLVED | Noted: 2018-10-01 | Resolved: 2025-07-29

## 2025-07-29 PROBLEM — F31.9 BIPOLAR DISORDER (HCC): Status: RESOLVED | Noted: 2018-09-30 | Resolved: 2025-07-29

## 2025-07-29 PROCEDURE — 36415 COLL VENOUS BLD VENIPUNCTURE: CPT | Performed by: INTERNAL MEDICINE

## 2025-07-29 PROCEDURE — 3079F DIAST BP 80-89 MM HG: CPT | Performed by: INTERNAL MEDICINE

## 2025-07-29 PROCEDURE — 3075F SYST BP GE 130 - 139MM HG: CPT | Performed by: INTERNAL MEDICINE

## 2025-07-29 PROCEDURE — 99213 OFFICE O/P EST LOW 20 MIN: CPT | Performed by: INTERNAL MEDICINE

## 2025-07-29 RX ORDER — COLESTIPOL HYDROCHLORIDE 1 G/1
1 TABLET ORAL 2 TIMES DAILY
Qty: 60 TABLET | Refills: 1 | Status: SHIPPED | OUTPATIENT
Start: 2025-07-29

## 2025-07-29 NOTE — PROGRESS NOTES
[facility]      SUBJECTIVE:  Caterina Oliva (:  1975) is a 50 y.o. female here for evaluation of the following chief complaint(s):  Follow-up    Assessment & Plan  1. Diarrhea.  - A celiac panel will be rechecked today.  - Colestipol 1 g twice daily for a month will be prescribed to bulk up the stool and reduce diarrhea.  - If constipation occurs, the dosage can be reduced to once daily.  - Advised to discontinue Imodium and continue with Prilosec.    2. Depression.  - The current Zoloft regimen appears to be beneficial.  - The dosage will be maintained at 100 mg.  - Advised to keep the provider updated on progress.  - Reports a decrease in alcohol consumption and cravings since the increase in Zoloft dosage.    3. Alcohol use disorder.  - Reports a decrease in alcohol consumption and cravings since the increase in Zoloft dosage.  - Advised to continue with the current treatment plan.  - Counseling was discussed and offered if desired in the future.    4. Sleep apnea.  - A repeat sleep study will be ordered.  - Will be contacted by the sleep lab to schedule this.  - Reports increased fatigue lately.    5. Headaches.  - A CT scan was previously ordered but not yet completed.  - Reports difficulty in scheduling the scan due to phone prompts.    6. Health maintenance.  - Not immune to hepatitis B.  - The hepatitis B vaccine series was discussed, but she declined at this time.  - A mammogram will be scheduled at the center.  -Visit after next will be for PAP smear    Follow-up: A follow-up visit is scheduled in 10 weeks.      History of Present Illness  The patient presents for evaluation of chronic diarrhea, depression, alcohol use disorder, sleep apnea, and headaches.    She has been experiencing chronic diarrhea for several decades, with no improvement regardless of dietary changes. She reports frequent bowel movements, often more than urination. Despite

## 2025-07-31 LAB
GLIADIN DEAMINIDATED PEPTIDE AB IGA: <0.1 U/ML
GLIADIN DEAMINIDATED PEPTIDE AB IGG: <0.4 U/ML
IGA: 159 MG/DL (ref 70–400)
TISSUE TRANSGLUTAMINASE IGA: 0.2 U/ML

## 2025-08-10 ENCOUNTER — RESULTS FOLLOW-UP (OUTPATIENT)
Dept: PRIMARY CARE CLINIC | Age: 50
End: 2025-08-10

## 2025-08-25 ENCOUNTER — HOSPITAL ENCOUNTER (OUTPATIENT)
Dept: CT IMAGING | Age: 50
Discharge: HOME | End: 2025-08-27
Attending: INTERNAL MEDICINE
Payer: COMMERCIAL

## 2025-08-25 ENCOUNTER — HOSPITAL ENCOUNTER (OUTPATIENT)
Dept: SLEEP CENTER | Age: 50
Discharge: HOME | End: 2025-08-25
Payer: COMMERCIAL

## 2025-08-25 ENCOUNTER — PATIENT MESSAGE (OUTPATIENT)
Dept: PRIMARY CARE CLINIC | Age: 50
End: 2025-08-25

## 2025-08-25 DIAGNOSIS — G47.33 OSA (OBSTRUCTIVE SLEEP APNEA): ICD-10-CM

## 2025-08-25 DIAGNOSIS — R51.9 NONINTRACTABLE HEADACHE, UNSPECIFIED CHRONICITY PATTERN, UNSPECIFIED HEADACHE TYPE: ICD-10-CM

## 2025-08-25 PROCEDURE — 70450 CT HEAD/BRAIN W/O DYE: CPT

## 2025-08-25 PROCEDURE — 95811 POLYSOM 6/>YRS CPAP 4/> PARM: CPT

## 2025-08-25 RX ORDER — OMEPRAZOLE 20 MG/1
20 CAPSULE, DELAYED RELEASE ORAL DAILY
Qty: 90 CAPSULE | Refills: 1 | Status: SHIPPED | OUTPATIENT
Start: 2025-08-25

## 2025-08-26 VITALS — HEART RATE: 64 BPM | OXYGEN SATURATION: 99 % | WEIGHT: 237 LBS | HEIGHT: 68 IN | BODY MASS INDEX: 35.92 KG/M2

## 2025-08-26 ASSESSMENT — SLEEP AND FATIGUE QUESTIONNAIRES
HOW LIKELY ARE YOU TO NOD OFF OR FALL ASLEEP WHILE SITTING AND TALKING TO SOMEONE: WOULD NEVER DOZE
HOW LIKELY ARE YOU TO NOD OFF OR FALL ASLEEP WHILE SITTING INACTIVE IN A PUBLIC PLACE: WOULD NEVER DOZE
HOW LIKELY ARE YOU TO NOD OFF OR FALL ASLEEP WHEN YOU ARE A PASSENGER IN A CAR FOR AN HOUR WITHOUT A BREAK: WOULD NEVER DOZE
HOW LIKELY ARE YOU TO NOD OFF OR FALL ASLEEP WHILE SITTING QUIETLY AFTER LUNCH WITHOUT ALCOHOL: WOULD NEVER DOZE
HOW LIKELY ARE YOU TO NOD OFF OR FALL ASLEEP WHILE SITTING AND READING: WOULD NEVER DOZE
HOW LIKELY ARE YOU TO NOD OFF OR FALL ASLEEP WHILE LYING DOWN TO REST IN THE AFTERNOON WHEN CIRCUMSTANCES PERMIT: WOULD NEVER DOZE
HOW LIKELY ARE YOU TO NOD OFF OR FALL ASLEEP IN A CAR, WHILE STOPPED FOR A FEW MINUTES IN TRAFFIC: WOULD NEVER DOZE
HOW LIKELY ARE YOU TO NOD OFF OR FALL ASLEEP WHILE WATCHING TV: WOULD NEVER DOZE
ESS TOTAL SCORE: 0

## 2025-08-28 ENCOUNTER — TELEPHONE (OUTPATIENT)
Dept: PRIMARY CARE CLINIC | Age: 50
End: 2025-08-28

## 2025-08-28 LAB
ALBUMIN SERPL-MCNC: 4.1 G/DL (ref 3.5–5.2)
ALP SERPL-CCNC: 49 U/L (ref 35–104)
ALT SERPL-CCNC: 26 U/L (ref 0–35)
ANION GAP SERPL CALCULATED.3IONS-SCNC: 12 MMOL/L (ref 7–16)
AST SERPL-CCNC: 25 U/L (ref 0–35)
BASOPHILS # BLD: 0.05 K/UL (ref 0–0.2)
BASOPHILS NFR BLD: 1 % (ref 0–2)
BILIRUB SERPL-MCNC: 0.7 MG/DL (ref 0–1.2)
BUN SERPL-MCNC: 16 MG/DL (ref 6–20)
CALCIUM SERPL-MCNC: 9 MG/DL (ref 8.6–10)
CHLORIDE SERPL-SCNC: 106 MMOL/L (ref 98–107)
CO2 SERPL-SCNC: 25 MMOL/L (ref 22–29)
CREAT SERPL-MCNC: 0.7 MG/DL (ref 0.5–1)
EOSINOPHIL # BLD: 0.16 K/UL (ref 0.05–0.5)
EOSINOPHILS RELATIVE PERCENT: 3 % (ref 0–6)
ERYTHROCYTE [DISTWIDTH] IN BLOOD BY AUTOMATED COUNT: 12.2 % (ref 11.5–15)
GFR, ESTIMATED: >90 ML/MIN/1.73M2
GLUCOSE SERPL-MCNC: 95 MG/DL (ref 74–99)
HCT VFR BLD AUTO: 36.9 % (ref 34–48)
HGB BLD-MCNC: 12.6 G/DL (ref 11.5–15.5)
IMM GRANULOCYTES # BLD AUTO: <0.03 K/UL (ref 0–0.58)
IMM GRANULOCYTES NFR BLD: 0 % (ref 0–5)
LYMPHOCYTES NFR BLD: 2.15 K/UL (ref 1.5–4)
LYMPHOCYTES RELATIVE PERCENT: 41 % (ref 20–42)
MCH RBC QN AUTO: 32.6 PG (ref 26–35)
MCHC RBC AUTO-ENTMCNC: 34.1 G/DL (ref 32–34.5)
MCV RBC AUTO: 95.6 FL (ref 80–99.9)
MONOCYTES NFR BLD: 0.37 K/UL (ref 0.1–0.95)
MONOCYTES NFR BLD: 7 % (ref 2–12)
NEUTROPHILS NFR BLD: 48 % (ref 43–80)
NEUTS SEG NFR BLD: 2.52 K/UL (ref 1.8–7.3)
PLATELET # BLD AUTO: 179 K/UL (ref 130–450)
PMV BLD AUTO: 10.8 FL (ref 7–12)
POTASSIUM SERPL-SCNC: 4 MMOL/L (ref 3.5–5.1)
PREALB SERPL-MCNC: 34.1 MG/DL (ref 20–40)
PROT SERPL-MCNC: 6.3 G/DL (ref 6.4–8.3)
RBC # BLD AUTO: 3.86 M/UL (ref 3.5–5.5)
SODIUM SERPL-SCNC: 142 MMOL/L (ref 136–145)
WBC OTHER # BLD: 5.3 K/UL (ref 4.5–11.5)

## (undated) DEVICE — BLADE,STAINLESS-STEEL,11,STRL,DISPOSABLE: Brand: MEDLINE

## (undated) DEVICE — INSUFFLATION TUBING SET WITH FILTER, FUNNEL CONNECTOR AND LUER LOCK: Brand: JOSNOE MEDICAL INC

## (undated) DEVICE — TOWEL,OR,DSP,ST,BLUE,STD,6/PK,12PK/CS: Brand: MEDLINE

## (undated) DEVICE — APPLIER CLP L SHFT DIA12MM 20 ROT MULT LIGACLP

## (undated) DEVICE — GRADUATE TRIANG MEASURE 1000ML BLK PRNT

## (undated) DEVICE — SYRINGE 20ML LL S/C 50

## (undated) DEVICE — KIT,ANTI FOG,W/SPONGE & FLUID,SOFT PACK: Brand: MEDLINE

## (undated) DEVICE — INSUFFLATION NEEDLE TO ESTABLISH PNEUMOPERITONEUM.: Brand: INSUFFLATION NEEDLE

## (undated) DEVICE — ELECTRODE ES 36CM LAP FLAT L HK COAT DISP CLEANCOAT

## (undated) DEVICE — Device: Brand: TRANSFER SET

## (undated) DEVICE — SOLUTION IRRIG 2000ML 0.9% SOD CHL USP UROMATIC PLAS CONT

## (undated) DEVICE — GOWN,SIRUS,FABRNF,XL,20/CS: Brand: MEDLINE

## (undated) DEVICE — 4-PORT MANIFOLD: Brand: NEPTUNE 2

## (undated) DEVICE — TISSUE RETRIEVAL SYSTEM: Brand: INZII RETRIEVAL SYSTEM

## (undated) DEVICE — TROCAR: Brand: KII FIOS FIRST ENTRY

## (undated) DEVICE — TROCAR: Brand: KII® SLEEVE

## (undated) DEVICE — DOUBLE BASIN SET: Brand: MEDLINE INDUSTRIES, INC.

## (undated) DEVICE — NEEDLE HYPO 25GA L1.5IN BLU POLYPR HUB S STL REG BVL STR

## (undated) DEVICE — PUMP SUC IRR TBNG L10FT W/ HNDPC ASSEMB STRYKEFLOW 2

## (undated) DEVICE — GLOVE ORANGE PI 7 1/2   MSG9075

## (undated) DEVICE — SOLUTION SURG PREP 26 CC PURPREP

## (undated) DEVICE — DRAPE,LAP,CHOLE,W/TROUGHS,STERILE: Brand: MEDLINE

## (undated) DEVICE — PLUMEPORT ACTIV LAPAROSCOPIC SMOKE FILTRATION DEVICE: Brand: PLUMEPORT ACTIVE

## (undated) DEVICE — LIQUIBAND RAPID ADHESIVE 36/CS 0.8ML: Brand: MEDLINE

## (undated) DEVICE — ELECTRODE PT RET AD L9FT HI MOIST COND ADH HYDRGEL CORDED

## (undated) DEVICE — PACK PROCEDURE SURG GEN CUST

## (undated) DEVICE — LAPAROSCOPIC SCISSORS: Brand: EPIX LAPAROSCOPIC SCISSORS